# Patient Record
Sex: FEMALE | Race: WHITE | Employment: OTHER | URBAN - NONMETROPOLITAN AREA
[De-identification: names, ages, dates, MRNs, and addresses within clinical notes are randomized per-mention and may not be internally consistent; named-entity substitution may affect disease eponyms.]

---

## 2017-04-19 DIAGNOSIS — E87.6 HYPOKALEMIA: ICD-10-CM

## 2017-04-19 DIAGNOSIS — E11.9 TYPE 2 DIABETES MELLITUS WITHOUT COMPLICATION (HCC): ICD-10-CM

## 2017-04-27 RX ORDER — POTASSIUM CHLORIDE 750 MG/1
TABLET, FILM COATED, EXTENDED RELEASE ORAL
Qty: 90 TABLET | Refills: 0 | OUTPATIENT
Start: 2017-04-27

## 2017-05-02 ENCOUNTER — OFFICE VISIT (OUTPATIENT)
Dept: FAMILY MEDICINE CLINIC | Age: 77
End: 2017-05-02

## 2017-05-02 VITALS
HEART RATE: 68 BPM | RESPIRATION RATE: 14 BRPM | BODY MASS INDEX: 38.14 KG/M2 | HEIGHT: 64 IN | SYSTOLIC BLOOD PRESSURE: 130 MMHG | WEIGHT: 223.4 LBS | DIASTOLIC BLOOD PRESSURE: 86 MMHG

## 2017-05-02 DIAGNOSIS — E11.9 TYPE 2 DIABETES MELLITUS WITHOUT COMPLICATION, WITHOUT LONG-TERM CURRENT USE OF INSULIN (HCC): ICD-10-CM

## 2017-05-02 DIAGNOSIS — I10 ESSENTIAL HYPERTENSION: ICD-10-CM

## 2017-05-02 DIAGNOSIS — E87.6 HYPOKALEMIA: ICD-10-CM

## 2017-05-02 DIAGNOSIS — Z00.00 ANNUAL PHYSICAL EXAM: Primary | ICD-10-CM

## 2017-05-02 PROCEDURE — 99397 PER PM REEVAL EST PAT 65+ YR: CPT | Performed by: FAMILY MEDICINE

## 2017-05-02 PROCEDURE — G8598 ASA/ANTIPLAT THER USED: HCPCS | Performed by: FAMILY MEDICINE

## 2017-05-02 RX ORDER — LANCETS 30 GAUGE
1 EACH MISCELLANEOUS DAILY
Qty: 100 EACH | Refills: 3 | Status: SHIPPED | OUTPATIENT
Start: 2017-05-02 | End: 2018-04-27 | Stop reason: SDUPTHER

## 2017-05-02 RX ORDER — POTASSIUM CHLORIDE 750 MG/1
10 TABLET, FILM COATED, EXTENDED RELEASE ORAL DAILY
Qty: 90 TABLET | Refills: 3 | Status: SHIPPED | OUTPATIENT
Start: 2017-05-02 | End: 2018-04-27 | Stop reason: SDUPTHER

## 2017-05-02 RX ORDER — NISOLDIPINE 20 MG/1
20 TABLET, FILM COATED, EXTENDED RELEASE ORAL DAILY
Qty: 90 TABLET | Refills: 3 | Status: SHIPPED | OUTPATIENT
Start: 2017-05-02 | End: 2018-04-17 | Stop reason: ALTCHOICE

## 2017-05-02 RX ORDER — METOPROLOL SUCCINATE 25 MG/1
25 TABLET, EXTENDED RELEASE ORAL DAILY
Qty: 90 TABLET | Refills: 3 | Status: SHIPPED | OUTPATIENT
Start: 2017-05-02 | End: 2018-04-27 | Stop reason: SDUPTHER

## 2017-05-02 RX ORDER — VALSARTAN AND HYDROCHLOROTHIAZIDE 160; 12.5 MG/1; MG/1
1 TABLET, FILM COATED ORAL DAILY
Qty: 90 TABLET | Refills: 3 | Status: SHIPPED | OUTPATIENT
Start: 2017-05-02 | End: 2018-04-27 | Stop reason: SDUPTHER

## 2017-05-02 RX ORDER — ASPIRIN 81 MG/1
81 TABLET ORAL DAILY
Qty: 30 TABLET | Refills: 11 | COMMUNITY
Start: 2017-05-02 | End: 2018-04-27 | Stop reason: SDUPTHER

## 2017-05-02 ASSESSMENT — PATIENT HEALTH QUESTIONNAIRE - PHQ9
SUM OF ALL RESPONSES TO PHQ9 QUESTIONS 1 & 2: 0
2. FEELING DOWN, DEPRESSED OR HOPELESS: 0
SUM OF ALL RESPONSES TO PHQ QUESTIONS 1-9: 0
1. LITTLE INTEREST OR PLEASURE IN DOING THINGS: 0

## 2017-05-02 ASSESSMENT — ENCOUNTER SYMPTOMS
VOMITING: 0
EYE PAIN: 0
CHEST TIGHTNESS: 0
SORE THROAT: 0
ABDOMINAL PAIN: 0
NAUSEA: 0
CONSTIPATION: 0
RHINORRHEA: 0
DIARRHEA: 0
BACK PAIN: 0
COUGH: 0
SHORTNESS OF BREATH: 0
BLOOD IN STOOL: 0
WHEEZING: 0

## 2017-05-22 ENCOUNTER — TELEPHONE (OUTPATIENT)
Dept: FAMILY MEDICINE CLINIC | Age: 77
End: 2017-05-22

## 2017-05-22 DIAGNOSIS — E11.9 TYPE 2 DIABETES MELLITUS WITHOUT COMPLICATION, WITHOUT LONG-TERM CURRENT USE OF INSULIN (HCC): Primary | ICD-10-CM

## 2017-12-02 ENCOUNTER — HOSPITAL ENCOUNTER (OUTPATIENT)
Age: 77
Discharge: HOME | End: 2017-12-02
Payer: MEDICARE

## 2017-12-02 LAB
AVERAGE GLUCOSE: 138 MG/DL (ref 70–126)
HBA1C MFR BLD: 6.6 % (ref 4.4–6.4)

## 2017-12-02 PROCEDURE — 36415 COLL VENOUS BLD VENIPUNCTURE: CPT

## 2017-12-02 PROCEDURE — 83036 HEMOGLOBIN GLYCOSYLATED A1C: CPT

## 2017-12-04 ENCOUNTER — TELEPHONE (OUTPATIENT)
Dept: FAMILY MEDICINE CLINIC | Age: 77
End: 2017-12-04

## 2018-01-12 DIAGNOSIS — E11.9 TYPE 2 DIABETES MELLITUS WITHOUT COMPLICATION, WITHOUT LONG-TERM CURRENT USE OF INSULIN (HCC): ICD-10-CM

## 2018-02-12 DIAGNOSIS — E11.9 TYPE 2 DIABETES MELLITUS WITHOUT COMPLICATION, WITHOUT LONG-TERM CURRENT USE OF INSULIN (HCC): ICD-10-CM

## 2018-03-20 ENCOUNTER — OFFICE VISIT (OUTPATIENT)
Dept: FAMILY MEDICINE CLINIC | Age: 78
End: 2018-03-20
Payer: MEDICARE

## 2018-03-20 VITALS
HEIGHT: 64 IN | DIASTOLIC BLOOD PRESSURE: 76 MMHG | HEART RATE: 88 BPM | BODY MASS INDEX: 36.84 KG/M2 | SYSTOLIC BLOOD PRESSURE: 124 MMHG | WEIGHT: 215.8 LBS | RESPIRATION RATE: 16 BRPM | TEMPERATURE: 97.4 F

## 2018-03-20 DIAGNOSIS — I10 BENIGN ESSENTIAL HTN: ICD-10-CM

## 2018-03-20 DIAGNOSIS — G89.29 CHRONIC PAIN OF RIGHT KNEE: Primary | ICD-10-CM

## 2018-03-20 DIAGNOSIS — M25.561 CHRONIC PAIN OF RIGHT KNEE: Primary | ICD-10-CM

## 2018-03-20 DIAGNOSIS — F40.240 CLAUSTROPHOBIA: ICD-10-CM

## 2018-03-20 PROCEDURE — G8399 PT W/DXA RESULTS DOCUMENT: HCPCS | Performed by: FAMILY MEDICINE

## 2018-03-20 PROCEDURE — G8598 ASA/ANTIPLAT THER USED: HCPCS | Performed by: FAMILY MEDICINE

## 2018-03-20 PROCEDURE — 1123F ACP DISCUSS/DSCN MKR DOCD: CPT | Performed by: FAMILY MEDICINE

## 2018-03-20 PROCEDURE — G8417 CALC BMI ABV UP PARAM F/U: HCPCS | Performed by: FAMILY MEDICINE

## 2018-03-20 PROCEDURE — 1090F PRES/ABSN URINE INCON ASSESS: CPT | Performed by: FAMILY MEDICINE

## 2018-03-20 PROCEDURE — G8484 FLU IMMUNIZE NO ADMIN: HCPCS | Performed by: FAMILY MEDICINE

## 2018-03-20 PROCEDURE — 4040F PNEUMOC VAC/ADMIN/RCVD: CPT | Performed by: FAMILY MEDICINE

## 2018-03-20 PROCEDURE — G8427 DOCREV CUR MEDS BY ELIG CLIN: HCPCS | Performed by: FAMILY MEDICINE

## 2018-03-20 PROCEDURE — 1036F TOBACCO NON-USER: CPT | Performed by: FAMILY MEDICINE

## 2018-03-20 PROCEDURE — 99214 OFFICE O/P EST MOD 30 MIN: CPT | Performed by: FAMILY MEDICINE

## 2018-03-20 RX ORDER — DIAZEPAM 5 MG/1
TABLET ORAL
Qty: 1 TABLET | Refills: 0 | Status: SHIPPED | OUTPATIENT
Start: 2018-03-20 | End: 2018-03-20

## 2018-03-20 ASSESSMENT — ENCOUNTER SYMPTOMS
RHINORRHEA: 0
SORE THROAT: 0
ABDOMINAL PAIN: 0
SHORTNESS OF BREATH: 0
CONSTIPATION: 0
BACK PAIN: 0
BLOOD IN STOOL: 0
VOMITING: 0
WHEEZING: 0
DIARRHEA: 0
EYE PAIN: 0
COUGH: 0
CHEST TIGHTNESS: 0
NAUSEA: 0

## 2018-03-20 NOTE — PATIENT INSTRUCTIONS
Patient Education        Knee Pain or Injury: Care Instructions  Your Care Instructions    Injuries are a common cause of knee problems. Sudden (acute) injuries may be caused by a direct blow to the knee. They can also be caused by abnormal twisting, bending, or falling on the knee. Pain, bruising, or swelling may be severe, and may start within minutes of the injury. Overuse is another cause of knee pain. Other causes are climbing stairs, kneeling, and other activities that use the knee. Everyday wear and tear, especially as you get older, also can cause knee pain. Rest, along with home treatment, often relieves pain and allows your knee to heal. If you have a serious knee injury, you may need tests and treatment. Follow-up care is a key part of your treatment and safety. Be sure to make and go to all appointments, and call your doctor if you are having problems. It's also a good idea to know your test results and keep a list of the medicines you take. How can you care for yourself at home? · Be safe with medicines. Read and follow all instructions on the label. ¨ If the doctor gave you a prescription medicine for pain, take it as prescribed. ¨ If you are not taking a prescription pain medicine, ask your doctor if you can take an over-the-counter medicine. · Rest and protect your knee. Take a break from any activity that may cause pain. · Put ice or a cold pack on your knee for 10 to 20 minutes at a time. Put a thin cloth between the ice and your skin. · Prop up a sore knee on a pillow when you ice it or anytime you sit or lie down for the next 3 days. Try to keep it above the level of your heart. This will help reduce swelling. · If your knee is not swollen, you can put moist heat, a heating pad, or a warm cloth on your knee. · If your doctor recommends an elastic bandage, sleeve, or other type of support for your knee, wear it as directed.   · Follow your doctor's instructions about how much weight may be over 180/110. ? For example, call 911 if:  ? · You have symptoms of a heart attack. These may include:  ¨ Chest pain or pressure, or a strange feeling in the chest.  ¨ Sweating. ¨ Shortness of breath. ¨ Nausea or vomiting. ¨ Pain, pressure, or a strange feeling in the back, neck, jaw, or upper belly or in one or both shoulders or arms. ¨ Lightheadedness or sudden weakness. ¨ A fast or irregular heartbeat. ? · You have symptoms of a stroke. These may include:  ¨ Sudden numbness, tingling, weakness, or loss of movement in your face, arm, or leg, especially on only one side of your body. ¨ Sudden vision changes. ¨ Sudden trouble speaking. ¨ Sudden confusion or trouble understanding simple statements. ¨ Sudden problems with walking or balance. ¨ A sudden, severe headache that is different from past headaches. ? · You have severe back or belly pain. ?Do not wait until your blood pressure comes down on its own. Get help right away. ?Call your doctor now or seek immediate care if:  ? · Your blood pressure is much higher than normal (such as 180/110 or higher), but you don't have symptoms. ? · You think high blood pressure is causing symptoms, such as:  ¨ Severe headache. ¨ Blurry vision. ? Watch closely for changes in your health, and be sure to contact your doctor if:  ? · Your blood pressure measures 140/90 or higher at least 2 times. That means the top number is 140 or higher or the bottom number is 90 or higher, or both. ? · You think you may be having side effects from your blood pressure medicine. ? · Your blood pressure is usually normal, but it goes above normal at least 2 times. Where can you learn more? Go to https://OpenROVpeReachTax.Harry's. org and sign in to your Dropifi account. Enter X575 in the Tigermed box to learn more about \"High Blood Pressure: Care Instructions. \"     If you do not have an account, please click on the \"Sign Up Now\" link.   Current as vegetable toppings. Try using tomatoes, squash, spinach, broccoli, carrots, cauliflower, and onions. ¨ Have a variety of cut-up vegetables with a low-fat dip as an appetizer instead of chips and dip. ¨ Sprinkle sunflower seeds or chopped almonds over salads. Or try adding chopped walnuts or almonds to cooked vegetables. ¨ Try some vegetarian meals using beans and peas. Add garbanzo or kidney beans to salads. Make burritos and tacos with mashed gaytan beans or black beans. Where can you learn more? Go to https://ConnectFupePWA.SuperSolver.com. org and sign in to your Etherpad account. Enter A741 in the Push Energy box to learn more about \"DASH Diet: Care Instructions. \"     If you do not have an account, please click on the \"Sign Up Now\" link. Current as of: September 21, 2016  Content Version: 11.5  © 4835-7891 [employer]. Care instructions adapted under license by [facility] [place] ([facility]). If you have questions about a medical condition or this instruction, always ask your healthcare professional. [place] [address] any warranty or liability for your use of this information.

## 2018-04-17 ENCOUNTER — HOSPITAL ENCOUNTER (OUTPATIENT)
Dept: GENERAL RADIOLOGY | Age: 78
Discharge: HOME | End: 2018-04-17
Payer: MEDICARE

## 2018-04-17 ENCOUNTER — HOSPITAL ENCOUNTER (OUTPATIENT)
Dept: PREADMISSION TESTING | Age: 78
Discharge: HOME | End: 2018-04-21
Payer: MEDICARE

## 2018-04-17 VITALS
HEART RATE: 88 BPM | RESPIRATION RATE: 16 BRPM | OXYGEN SATURATION: 98 % | TEMPERATURE: 96 F | DIASTOLIC BLOOD PRESSURE: 80 MMHG | SYSTOLIC BLOOD PRESSURE: 146 MMHG | WEIGHT: 213.85 LBS | BODY MASS INDEX: 35.63 KG/M2 | HEIGHT: 65 IN

## 2018-04-17 DIAGNOSIS — Z01.818 PRE-OP TESTING: ICD-10-CM

## 2018-04-17 LAB
ALBUMIN SERPL-MCNC: 4.5 G/DL (ref 3.5–5.1)
ALP BLD-CCNC: 76 U/L (ref 38–126)
ALT SERPL-CCNC: 10 U/L (ref 11–66)
ANION GAP SERPL CALCULATED.3IONS-SCNC: 14 MEQ/L (ref 8–16)
AST SERPL-CCNC: 15 U/L (ref 5–40)
BACTERIA: ABNORMAL /HPF
BASOPHILS # BLD: 0.6 %
BASOPHILS ABSOLUTE: 0 THOU/MM3 (ref 0–0.1)
BILIRUB SERPL-MCNC: 0.5 MG/DL (ref 0.3–1.2)
BILIRUBIN URINE: NEGATIVE
BLOOD, URINE: NEGATIVE
BUN BLDV-MCNC: 15 MG/DL (ref 7–22)
CALCIUM SERPL-MCNC: 10.1 MG/DL (ref 8.5–10.5)
CASTS 2: ABNORMAL /LPF
CASTS UA: ABNORMAL /LPF
CHARACTER, URINE: ABNORMAL
CHLORIDE BLD-SCNC: 98 MEQ/L (ref 98–111)
CO2: 28 MEQ/L (ref 23–33)
COLOR: YELLOW
CREAT SERPL-MCNC: 0.6 MG/DL (ref 0.4–1.2)
CRYSTALS, UA: ABNORMAL
EOSINOPHIL # BLD: 2.9 %
EOSINOPHILS ABSOLUTE: 0.2 THOU/MM3 (ref 0–0.4)
EPITHELIAL CELLS, UA: ABNORMAL /HPF
GFR SERPL CREATININE-BSD FRML MDRD: > 90 ML/MIN/1.73M2
GLUCOSE BLD-MCNC: 149 MG/DL (ref 70–108)
GLUCOSE URINE: NEGATIVE MG/DL
HCT VFR BLD CALC: 40 % (ref 37–47)
HEMOGLOBIN: 13.5 GM/DL (ref 12–16)
KETONES, URINE: NEGATIVE
LEUKOCYTE ESTERASE, URINE: ABNORMAL
LYMPHOCYTES # BLD: 28.6 %
LYMPHOCYTES ABSOLUTE: 1.7 THOU/MM3 (ref 1–4.8)
MCH RBC QN AUTO: 28.7 PG (ref 27–31)
MCHC RBC AUTO-ENTMCNC: 33.9 GM/DL (ref 33–37)
MCV RBC AUTO: 84.7 FL (ref 81–99)
MISCELLANEOUS 2: ABNORMAL
MONOCYTES # BLD: 8 %
MONOCYTES ABSOLUTE: 0.5 THOU/MM3 (ref 0.4–1.3)
MRSA NASAL SCREEN RT-PCR: NEGATIVE
NITRITE, URINE: NEGATIVE
NUCLEATED RED BLOOD CELLS: 0 /100 WBC
PDW BLD-RTO: 14.3 % (ref 11.5–14.5)
PH UA: 6
PLATELET # BLD: 232 THOU/MM3 (ref 130–400)
PMV BLD AUTO: 8.7 FL (ref 7.4–10.4)
POTASSIUM SERPL-SCNC: 3.8 MEQ/L (ref 3.5–5.2)
PROTEIN UA: NEGATIVE
RBC # BLD: 4.72 MILL/MM3 (ref 4.2–5.4)
RBC URINE: ABNORMAL /HPF
RENAL EPITHELIAL, UA: ABNORMAL
SEG NEUTROPHILS: 59.9 %
SEGMENTED NEUTROPHILS ABSOLUTE COUNT: 3.6 THOU/MM3 (ref 1.8–7.7)
SODIUM BLD-SCNC: 140 MEQ/L (ref 135–145)
SPECIFIC GRAVITY, URINE: 1.01 (ref 1–1.03)
STAPH AUREUS SCREEN RT-PCR: NEGATIVE
TOTAL PROTEIN: 7.6 G/DL (ref 6.1–8)
UROBILINOGEN, URINE: 1 EU/DL
WBC # BLD: 6 THOU/MM3 (ref 4.8–10.8)
WBC UA: ABNORMAL /HPF
YEAST: ABNORMAL

## 2018-04-17 PROCEDURE — 36415 COLL VENOUS BLD VENIPUNCTURE: CPT

## 2018-04-17 PROCEDURE — 73560 X-RAY EXAM OF KNEE 1 OR 2: CPT

## 2018-04-17 PROCEDURE — 87184 SC STD DISK METHOD PER PLATE: CPT

## 2018-04-17 PROCEDURE — 71046 X-RAY EXAM CHEST 2 VIEWS: CPT

## 2018-04-17 PROCEDURE — 87086 URINE CULTURE/COLONY COUNT: CPT

## 2018-04-17 PROCEDURE — 85025 COMPLETE CBC W/AUTO DIFF WBC: CPT

## 2018-04-17 PROCEDURE — 81001 URINALYSIS AUTO W/SCOPE: CPT

## 2018-04-17 PROCEDURE — 87077 CULTURE AEROBIC IDENTIFY: CPT

## 2018-04-17 PROCEDURE — 80053 COMPREHEN METABOLIC PANEL: CPT

## 2018-04-17 PROCEDURE — 87641 MR-STAPH DNA AMP PROBE: CPT

## 2018-04-17 PROCEDURE — 87186 SC STD MICRODIL/AGAR DIL: CPT

## 2018-04-17 PROCEDURE — 77077 JOINT SURVEY SINGLE VIEW: CPT

## 2018-04-17 RX ORDER — AMLODIPINE BESYLATE 10 MG/1
10 TABLET ORAL NIGHTLY
COMMUNITY
Start: 2018-03-31 | End: 2018-04-27 | Stop reason: SDUPTHER

## 2018-04-17 ASSESSMENT — PAIN DESCRIPTION - DESCRIPTORS: DESCRIPTORS: ACHING;SHARP

## 2018-04-17 ASSESSMENT — PAIN DESCRIPTION - PAIN TYPE: TYPE: CHRONIC PAIN

## 2018-04-17 ASSESSMENT — PAIN DESCRIPTION - LOCATION: LOCATION: KNEE

## 2018-04-17 ASSESSMENT — PAIN DESCRIPTION - ORIENTATION: ORIENTATION: RIGHT

## 2018-04-17 ASSESSMENT — PAIN SCALES - GENERAL: PAINLEVEL_OUTOF10: 6

## 2018-04-17 ASSESSMENT — PAIN DESCRIPTION - FREQUENCY: FREQUENCY: CONTINUOUS

## 2018-04-19 LAB
ORGANISM: ABNORMAL
URINE CULTURE REFLEX: ABNORMAL

## 2018-04-27 ENCOUNTER — OFFICE VISIT (OUTPATIENT)
Dept: FAMILY MEDICINE CLINIC | Age: 78
End: 2018-04-27
Payer: MEDICARE

## 2018-04-27 VITALS
DIASTOLIC BLOOD PRESSURE: 82 MMHG | SYSTOLIC BLOOD PRESSURE: 128 MMHG | HEIGHT: 65 IN | WEIGHT: 210.8 LBS | RESPIRATION RATE: 12 BRPM | BODY MASS INDEX: 35.12 KG/M2 | HEART RATE: 76 BPM

## 2018-04-27 DIAGNOSIS — I25.10 CORONARY ARTERY DISEASE INVOLVING NATIVE CORONARY ARTERY OF NATIVE HEART WITHOUT ANGINA PECTORIS: ICD-10-CM

## 2018-04-27 DIAGNOSIS — E87.6 HYPOKALEMIA: ICD-10-CM

## 2018-04-27 DIAGNOSIS — Z01.810 PRE-OPERATIVE CARDIOVASCULAR EXAMINATION: Primary | ICD-10-CM

## 2018-04-27 DIAGNOSIS — I10 ESSENTIAL HYPERTENSION: ICD-10-CM

## 2018-04-27 DIAGNOSIS — M17.0 PRIMARY OSTEOARTHRITIS OF BOTH KNEES: ICD-10-CM

## 2018-04-27 DIAGNOSIS — E11.9 TYPE 2 DIABETES MELLITUS WITHOUT COMPLICATION, WITHOUT LONG-TERM CURRENT USE OF INSULIN (HCC): ICD-10-CM

## 2018-04-27 PROCEDURE — G8417 CALC BMI ABV UP PARAM F/U: HCPCS | Performed by: FAMILY MEDICINE

## 2018-04-27 PROCEDURE — 1036F TOBACCO NON-USER: CPT | Performed by: FAMILY MEDICINE

## 2018-04-27 PROCEDURE — 1123F ACP DISCUSS/DSCN MKR DOCD: CPT | Performed by: FAMILY MEDICINE

## 2018-04-27 PROCEDURE — G8427 DOCREV CUR MEDS BY ELIG CLIN: HCPCS | Performed by: FAMILY MEDICINE

## 2018-04-27 PROCEDURE — 4040F PNEUMOC VAC/ADMIN/RCVD: CPT | Performed by: FAMILY MEDICINE

## 2018-04-27 PROCEDURE — G8399 PT W/DXA RESULTS DOCUMENT: HCPCS | Performed by: FAMILY MEDICINE

## 2018-04-27 PROCEDURE — 1090F PRES/ABSN URINE INCON ASSESS: CPT | Performed by: FAMILY MEDICINE

## 2018-04-27 PROCEDURE — G8598 ASA/ANTIPLAT THER USED: HCPCS | Performed by: FAMILY MEDICINE

## 2018-04-27 PROCEDURE — 99214 OFFICE O/P EST MOD 30 MIN: CPT | Performed by: FAMILY MEDICINE

## 2018-04-27 RX ORDER — ASPIRIN 81 MG/1
81 TABLET ORAL DAILY
Qty: 30 TABLET | Refills: 11 | Status: ON HOLD | COMMUNITY
Start: 2018-04-27 | End: 2018-05-04

## 2018-04-27 RX ORDER — LANCETS 30 GAUGE
1 EACH MISCELLANEOUS DAILY
Qty: 100 EACH | Refills: 3 | Status: SHIPPED | OUTPATIENT
Start: 2018-04-27 | End: 2019-06-28 | Stop reason: SDUPTHER

## 2018-04-27 RX ORDER — VALSARTAN AND HYDROCHLOROTHIAZIDE 160; 12.5 MG/1; MG/1
1 TABLET, FILM COATED ORAL DAILY
Qty: 90 TABLET | Refills: 3 | Status: SHIPPED | OUTPATIENT
Start: 2018-04-27 | End: 2019-06-28 | Stop reason: SDUPTHER

## 2018-04-27 RX ORDER — AMLODIPINE BESYLATE 10 MG/1
10 TABLET ORAL NIGHTLY
Qty: 90 TABLET | Refills: 3 | Status: SHIPPED | OUTPATIENT
Start: 2018-04-27 | End: 2019-06-28 | Stop reason: SDUPTHER

## 2018-04-27 RX ORDER — METOPROLOL SUCCINATE 25 MG/1
25 TABLET, EXTENDED RELEASE ORAL DAILY
Qty: 90 TABLET | Refills: 3 | Status: SHIPPED | OUTPATIENT
Start: 2018-04-27 | End: 2019-06-28 | Stop reason: SDUPTHER

## 2018-04-27 RX ORDER — POTASSIUM CHLORIDE 750 MG/1
10 TABLET, FILM COATED, EXTENDED RELEASE ORAL DAILY
Qty: 90 TABLET | Refills: 3 | Status: SHIPPED | OUTPATIENT
Start: 2018-04-27 | End: 2019-06-28 | Stop reason: SDUPTHER

## 2018-04-27 ASSESSMENT — ENCOUNTER SYMPTOMS
BLOOD IN STOOL: 0
SORE THROAT: 0
WHEEZING: 0
ABDOMINAL PAIN: 0
SHORTNESS OF BREATH: 0
VOMITING: 0
CONSTIPATION: 0
COUGH: 0
RHINORRHEA: 0
NAUSEA: 0
CHEST TIGHTNESS: 0
BACK PAIN: 0
EYE PAIN: 0
DIARRHEA: 0

## 2018-04-27 ASSESSMENT — PATIENT HEALTH QUESTIONNAIRE - PHQ9
2. FEELING DOWN, DEPRESSED OR HOPELESS: 0
SUM OF ALL RESPONSES TO PHQ QUESTIONS 1-9: 0
1. LITTLE INTEREST OR PLEASURE IN DOING THINGS: 0
SUM OF ALL RESPONSES TO PHQ9 QUESTIONS 1 & 2: 0

## 2018-04-30 ENCOUNTER — HOSPITAL ENCOUNTER (OUTPATIENT)
Dept: NURSING | Age: 78
Discharge: HOME | DRG: 470 | End: 2018-04-30
Payer: MEDICARE

## 2018-04-30 ENCOUNTER — OFFICE VISIT (OUTPATIENT)
Dept: FAMILY MEDICINE CLINIC | Age: 78
End: 2018-04-30
Payer: MEDICARE

## 2018-04-30 ENCOUNTER — HOSPITAL ENCOUNTER (OUTPATIENT)
Age: 78
Discharge: HOME | DRG: 470 | End: 2018-04-30
Payer: MEDICARE

## 2018-04-30 VITALS
TEMPERATURE: 97.8 F | DIASTOLIC BLOOD PRESSURE: 76 MMHG | HEART RATE: 88 BPM | BODY MASS INDEX: 35.32 KG/M2 | RESPIRATION RATE: 20 BRPM | WEIGHT: 212 LBS | SYSTOLIC BLOOD PRESSURE: 132 MMHG | HEIGHT: 65 IN

## 2018-04-30 VITALS — RESPIRATION RATE: 16 BRPM | SYSTOLIC BLOOD PRESSURE: 160 MMHG | HEART RATE: 92 BPM | DIASTOLIC BLOOD PRESSURE: 81 MMHG

## 2018-04-30 DIAGNOSIS — R07.81 RIB PAIN ON RIGHT SIDE: ICD-10-CM
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
## 2018-04-30 LAB
BILIRUBIN URINE: NEGATIVE
BLOOD, URINE: NEGATIVE
CHARACTER, URINE: CLEAR
COLOR: YELLOW
GLUCOSE, URINE: NEGATIVE MG/DL
KETONES, URINE: NEGATIVE
LEUKOCYTE EST, POC: NEGATIVE
NITRITE, URINE: NEGATIVE
PH UA: 5
PROTEIN UA: NEGATIVE MG/DL
SPECIFIC GRAVITY UA: 1.02 (ref 1–1.03)
UROBILINOGEN, URINE: 0.2 EU/DL

## 2018-04-30 PROCEDURE — 1123F ACP DISCUSS/DSCN MKR DOCD: CPT | Performed by: FAMILY MEDICINE

## 2018-04-30 PROCEDURE — 1090F PRES/ABSN URINE INCON ASSESS: CPT | Performed by: FAMILY MEDICINE

## 2018-04-30 PROCEDURE — G8417 CALC BMI ABV UP PARAM F/U: HCPCS | Performed by: FAMILY MEDICINE

## 2018-04-30 PROCEDURE — 99213 OFFICE O/P EST LOW 20 MIN: CPT | Performed by: FAMILY MEDICINE

## 2018-04-30 PROCEDURE — G8399 PT W/DXA RESULTS DOCUMENT: HCPCS | Performed by: FAMILY MEDICINE

## 2018-04-30 PROCEDURE — 81003 URINALYSIS AUTO W/O SCOPE: CPT

## 2018-04-30 PROCEDURE — 1036F TOBACCO NON-USER: CPT | Performed by: FAMILY MEDICINE

## 2018-04-30 PROCEDURE — P9612 CATHETERIZE FOR URINE SPEC: HCPCS

## 2018-04-30 PROCEDURE — G8427 DOCREV CUR MEDS BY ELIG CLIN: HCPCS | Performed by: FAMILY MEDICINE

## 2018-04-30 PROCEDURE — G8598 ASA/ANTIPLAT THER USED: HCPCS | Performed by: FAMILY MEDICINE

## 2018-04-30 PROCEDURE — 4040F PNEUMOC VAC/ADMIN/RCVD: CPT | Performed by: FAMILY MEDICINE

## 2018-04-30 PROCEDURE — 87086 URINE CULTURE/COLONY COUNT: CPT

## 2018-04-30 RX ORDER — CIPROFLOXACIN 500 MG/1
500 TABLET, FILM COATED ORAL 2 TIMES DAILY
COMMUNITY
End: 2019-03-18

## 2018-04-30 ASSESSMENT — ENCOUNTER SYMPTOMS
EYE PAIN: 0
CHEST TIGHTNESS: 0
COUGH: 0
SHORTNESS OF BREATH: 0
DIARRHEA: 0
SORE THROAT: 0
CONSTIPATION: 0
WHEEZING: 0
BLOOD IN STOOL: 0
BACK PAIN: 0
ABDOMINAL PAIN: 0
NAUSEA: 0
VOMITING: 0
RHINORRHEA: 0

## 2018-04-30 ASSESSMENT — PAIN - FUNCTIONAL ASSESSMENT: PAIN_FUNCTIONAL_ASSESSMENT: 0-10

## 2018-04-30 ASSESSMENT — PAIN DESCRIPTION - DESCRIPTORS: DESCRIPTORS: ACHING

## 2018-04-30 NOTE — PROGRESS NOTES
[address] female admitted for Cath urine Pt rights and responsibilities offered to pt to read. procedure reviewed with pt verbalized understanding. [id number] Cath urine obtained easily, labeled and sent to lab.  0955 Tolerated procedure well. Home instructions to pt verbalized understanding. Gait steady. 0239 Discharged ambulatory stable home.     __met__ Safety:       (Environmental)   [place] to environment   Ensure ID band is correct and in place/ allergy band as needed   Assess for fall risk   Initiate fall precautions as applicable (fall band, side rails, etc.)   Call light within reach   Bed in low position/ wheels locked    _nm___ Pain:        Assess pain level and characteristics   Administer analgesics as ordered   Assess effectiveness of pain management and report to MD as needed    _met___ Knowledge Deficit:   Assess baseline knowledge   Provide teaching at level of understanding   Provide teaching via preferred learning method   Evaluate teaching effectiveness    __

## 2018-05-01 ENCOUNTER — ANESTHESIA (OUTPATIENT)
Dept: OPERATING ROOM | Age: 78
DRG: 470 | End: 2018-05-01
Payer: MEDICARE

## 2018-05-01 ENCOUNTER — HOSPITAL ENCOUNTER (INPATIENT)
Age: 78
LOS: 3 days | Discharge: NURSING, REHAB OR LONG-TERM CARE | DRG: 470 | End: 2018-05-04
Attending: ORTHOPAEDIC SURGERY | Admitting: ORTHOPAEDIC SURGERY
Payer: MEDICARE

## 2018-05-01 ENCOUNTER — APPOINTMENT (OUTPATIENT)
Dept: GENERAL RADIOLOGY | Age: 78
DRG: 470 | End: 2018-05-01
Attending: ORTHOPAEDIC SURGERY
Payer: MEDICARE

## 2018-05-01 ENCOUNTER — ANESTHESIA EVENT (OUTPATIENT)
Dept: OPERATING ROOM | Age: 78
DRG: 470 | End: 2018-05-01
Payer: MEDICARE

## 2018-05-01 VITALS
DIASTOLIC BLOOD PRESSURE: 101 MMHG | SYSTOLIC BLOOD PRESSURE: 181 MMHG | TEMPERATURE: 97 F | RESPIRATION RATE: 16 BRPM | OXYGEN SATURATION: 91 %

## 2018-05-01 DIAGNOSIS — E11.9 TYPE 2 DIABETES MELLITUS WITHOUT COMPLICATION, WITHOUT LONG-TERM CURRENT USE OF INSULIN (HCC): ICD-10-CM

## 2018-05-01 DIAGNOSIS — M17.11 PRIMARY OSTEOARTHRITIS OF RIGHT KNEE: Primary | ICD-10-CM

## 2018-05-01 PROBLEM — M17.10 OSTEOARTHRITIS, LOCALIZED, KNEE: Status: ACTIVE | Noted: 2018-05-01

## 2018-05-01 LAB
AVERAGE GLUCOSE: 144 MG/DL (ref 70–126)
GLUCOSE BLD-MCNC: 175 MG/DL (ref 70–108)
GLUCOSE BLD-MCNC: 183 MG/DL (ref 70–108)
GLUCOSE BLD-MCNC: 227 MG/DL (ref 70–108)
HBA1C MFR BLD: 6.8 % (ref 4.4–6.4)
POTASSIUM SERPL-SCNC: 3.6 MEQ/L (ref 3.5–5.2)

## 2018-05-01 PROCEDURE — 2500000003 HC RX 250 WO HCPCS: Performed by: NURSE ANESTHETIST, CERTIFIED REGISTERED

## 2018-05-01 PROCEDURE — 6370000000 HC RX 637 (ALT 250 FOR IP): Performed by: INTERNAL MEDICINE

## 2018-05-01 PROCEDURE — 6360000002 HC RX W HCPCS: Performed by: ANESTHESIOLOGY

## 2018-05-01 PROCEDURE — 97162 PT EVAL MOD COMPLEX 30 MIN: CPT

## 2018-05-01 PROCEDURE — 97530 THERAPEUTIC ACTIVITIES: CPT

## 2018-05-01 PROCEDURE — 36415 COLL VENOUS BLD VENIPUNCTURE: CPT

## 2018-05-01 PROCEDURE — 3700000001 HC ADD 15 MINUTES (ANESTHESIA): Performed by: ORTHOPAEDIC SURGERY

## 2018-05-01 PROCEDURE — 73560 X-RAY EXAM OF KNEE 1 OR 2: CPT

## 2018-05-01 PROCEDURE — 1200000000 HC SEMI PRIVATE

## 2018-05-01 PROCEDURE — G8979 MOBILITY GOAL STATUS: HCPCS

## 2018-05-01 PROCEDURE — 3600000005 HC SURGERY LEVEL 5 BASE: Performed by: ORTHOPAEDIC SURGERY

## 2018-05-01 PROCEDURE — 7100000000 HC PACU RECOVERY - FIRST 15 MIN: Performed by: ORTHOPAEDIC SURGERY

## 2018-05-01 PROCEDURE — G8978 MOBILITY CURRENT STATUS: HCPCS

## 2018-05-01 PROCEDURE — L1830 KO IMMOB CANVAS LONG PRE OTS: HCPCS | Performed by: ORTHOPAEDIC SURGERY

## 2018-05-01 PROCEDURE — 83036 HEMOGLOBIN GLYCOSYLATED A1C: CPT

## 2018-05-01 PROCEDURE — 2500000003 HC RX 250 WO HCPCS: Performed by: ORTHOPAEDIC SURGERY

## 2018-05-01 PROCEDURE — 97110 THERAPEUTIC EXERCISES: CPT

## 2018-05-01 PROCEDURE — 7100000001 HC PACU RECOVERY - ADDTL 15 MIN: Performed by: ORTHOPAEDIC SURGERY

## 2018-05-01 PROCEDURE — 6360000002 HC RX W HCPCS: Performed by: ORTHOPAEDIC SURGERY

## 2018-05-01 PROCEDURE — C1713 ANCHOR/SCREW BN/BN,TIS/BN: HCPCS | Performed by: ORTHOPAEDIC SURGERY

## 2018-05-01 PROCEDURE — 2580000003 HC RX 258: Performed by: ORTHOPAEDIC SURGERY

## 2018-05-01 PROCEDURE — C1776 JOINT DEVICE (IMPLANTABLE): HCPCS | Performed by: ORTHOPAEDIC SURGERY

## 2018-05-01 PROCEDURE — 64447 NJX AA&/STRD FEMORAL NRV IMG: CPT | Performed by: ANESTHESIOLOGY

## 2018-05-01 PROCEDURE — 82948 REAGENT STRIP/BLOOD GLUCOSE: CPT

## 2018-05-01 PROCEDURE — 0SRC0J9 REPLACEMENT OF RIGHT KNEE JOINT WITH SYNTHETIC SUBSTITUTE, CEMENTED, OPEN APPROACH: ICD-10-PCS | Performed by: ORTHOPAEDIC SURGERY

## 2018-05-01 PROCEDURE — 3600000015 HC SURGERY LEVEL 5 ADDTL 15MIN: Performed by: ORTHOPAEDIC SURGERY

## 2018-05-01 PROCEDURE — 6360000002 HC RX W HCPCS: Performed by: NURSE ANESTHETIST, CERTIFIED REGISTERED

## 2018-05-01 PROCEDURE — 6370000000 HC RX 637 (ALT 250 FOR IP)

## 2018-05-01 PROCEDURE — 84132 ASSAY OF SERUM POTASSIUM: CPT

## 2018-05-01 PROCEDURE — 3700000000 HC ANESTHESIA ATTENDED CARE: Performed by: ORTHOPAEDIC SURGERY

## 2018-05-01 PROCEDURE — 6370000000 HC RX 637 (ALT 250 FOR IP): Performed by: ORTHOPAEDIC SURGERY

## 2018-05-01 DEVICE — VERSABOND 40 GRAMS FORMULATION 2
Type: IMPLANTABLE DEVICE | Status: FUNCTIONAL
Brand: VERSABOND

## 2018-05-01 DEVICE — GENESIS II RESURFACING PATELLAR 35MM
Type: IMPLANTABLE DEVICE | Status: FUNCTIONAL
Brand: GENESIS II

## 2018-05-01 DEVICE — GENESIS II NON-POROUS TIBIAL                                    BASEPLATE SIZE 5 RIGHT
Type: IMPLANTABLE DEVICE | Status: FUNCTIONAL
Brand: GENESIS II

## 2018-05-01 DEVICE — GII NONPOROUS POSTERIOR STABILIZED                                    FEMORAL SZ 6 RT
Type: IMPLANTABLE DEVICE | Status: FUNCTIONAL
Brand: GENESIS II

## 2018-05-01 DEVICE — GENESIS II POSTERIOR STABILIZED                                    HIGH FLEXION INSERT SIZE 5-6 11MM
Type: IMPLANTABLE DEVICE | Status: FUNCTIONAL
Brand: GENESIS II

## 2018-05-01 RX ORDER — OMEPRAZOLE 20 MG/1
20 TABLET, DELAYED RELEASE ORAL DAILY PRN
Status: DISCONTINUED | OUTPATIENT
Start: 2018-05-01 | End: 2018-05-01 | Stop reason: CLARIF

## 2018-05-01 RX ORDER — SUCCINYLCHOLINE CHLORIDE 20 MG/ML
INJECTION INTRAMUSCULAR; INTRAVENOUS PRN
Status: DISCONTINUED | OUTPATIENT
Start: 2018-05-01 | End: 2018-05-01 | Stop reason: SDUPTHER

## 2018-05-01 RX ORDER — LABETALOL HYDROCHLORIDE 5 MG/ML
5 INJECTION, SOLUTION INTRAVENOUS EVERY 10 MIN PRN
Status: DISCONTINUED | OUTPATIENT
Start: 2018-05-01 | End: 2018-05-01 | Stop reason: HOSPADM

## 2018-05-01 RX ORDER — METOPROLOL SUCCINATE 25 MG/1
25 TABLET, EXTENDED RELEASE ORAL DAILY
Status: DISCONTINUED | OUTPATIENT
Start: 2018-05-02 | End: 2018-05-04 | Stop reason: HOSPADM

## 2018-05-01 RX ORDER — DEXTROSE MONOHYDRATE 25 G/50ML
12.5 INJECTION, SOLUTION INTRAVENOUS PRN
Status: DISCONTINUED | OUTPATIENT
Start: 2018-05-01 | End: 2018-05-04 | Stop reason: HOSPADM

## 2018-05-01 RX ORDER — TRANEXAMIC ACID 100 MG/ML
INJECTION, SOLUTION INTRAVENOUS PRN
Status: DISCONTINUED | OUTPATIENT
Start: 2018-05-01 | End: 2018-05-01 | Stop reason: HOSPADM

## 2018-05-01 RX ORDER — VALSARTAN AND HYDROCHLOROTHIAZIDE 160; 12.5 MG/1; MG/1
1 TABLET, FILM COATED ORAL DAILY
Status: DISCONTINUED | OUTPATIENT
Start: 2018-05-01 | End: 2018-05-01 | Stop reason: CLARIF

## 2018-05-01 RX ORDER — DIAZEPAM 5 MG/1
TABLET ORAL
Status: COMPLETED
Start: 2018-05-01 | End: 2018-05-01

## 2018-05-01 RX ORDER — VALSARTAN 160 MG/1
160 TABLET ORAL DAILY
Status: DISCONTINUED | OUTPATIENT
Start: 2018-05-02 | End: 2018-05-04 | Stop reason: HOSPADM

## 2018-05-01 RX ORDER — HYDROXYZINE PAMOATE 25 MG/1
25 CAPSULE ORAL EVERY 6 HOURS PRN
Status: DISCONTINUED | OUTPATIENT
Start: 2018-05-01 | End: 2018-05-04 | Stop reason: HOSPADM

## 2018-05-01 RX ORDER — FENTANYL CITRATE 50 UG/ML
25 INJECTION, SOLUTION INTRAMUSCULAR; INTRAVENOUS EVERY 5 MIN PRN
Status: DISCONTINUED | OUTPATIENT
Start: 2018-05-01 | End: 2018-05-01 | Stop reason: HOSPADM

## 2018-05-01 RX ORDER — ONDANSETRON 2 MG/ML
4 INJECTION INTRAMUSCULAR; INTRAVENOUS EVERY 6 HOURS PRN
Status: DISCONTINUED | OUTPATIENT
Start: 2018-05-01 | End: 2018-05-04 | Stop reason: HOSPADM

## 2018-05-01 RX ORDER — HYDRALAZINE HYDROCHLORIDE 20 MG/ML
5 INJECTION INTRAMUSCULAR; INTRAVENOUS EVERY 10 MIN PRN
Status: DISCONTINUED | OUTPATIENT
Start: 2018-05-01 | End: 2018-05-01 | Stop reason: HOSPADM

## 2018-05-01 RX ORDER — DEXTROSE MONOHYDRATE 50 MG/ML
100 INJECTION, SOLUTION INTRAVENOUS PRN
Status: DISCONTINUED | OUTPATIENT
Start: 2018-05-01 | End: 2018-05-04 | Stop reason: HOSPADM

## 2018-05-01 RX ORDER — POTASSIUM CHLORIDE 750 MG/1
10 TABLET, FILM COATED, EXTENDED RELEASE ORAL DAILY
Status: DISCONTINUED | OUTPATIENT
Start: 2018-05-01 | End: 2018-05-04 | Stop reason: HOSPADM

## 2018-05-01 RX ORDER — HYDROCHLOROTHIAZIDE 25 MG/1
12.5 TABLET ORAL DAILY
Status: DISCONTINUED | OUTPATIENT
Start: 2018-05-02 | End: 2018-05-04 | Stop reason: HOSPADM

## 2018-05-01 RX ORDER — ONDANSETRON 2 MG/ML
INJECTION INTRAMUSCULAR; INTRAVENOUS PRN
Status: DISCONTINUED | OUTPATIENT
Start: 2018-05-01 | End: 2018-05-01 | Stop reason: SDUPTHER

## 2018-05-01 RX ORDER — FENTANYL CITRATE 50 UG/ML
50 INJECTION, SOLUTION INTRAMUSCULAR; INTRAVENOUS EVERY 5 MIN PRN
Status: DISCONTINUED | OUTPATIENT
Start: 2018-05-01 | End: 2018-05-01 | Stop reason: HOSPADM

## 2018-05-01 RX ORDER — PANTOPRAZOLE SODIUM 40 MG/1
40 TABLET, DELAYED RELEASE ORAL DAILY PRN
Status: DISCONTINUED | OUTPATIENT
Start: 2018-05-01 | End: 2018-05-04 | Stop reason: HOSPADM

## 2018-05-01 RX ORDER — ACETAMINOPHEN 325 MG/1
650 TABLET ORAL EVERY 4 HOURS PRN
Status: DISCONTINUED | OUTPATIENT
Start: 2018-05-01 | End: 2018-05-04 | Stop reason: HOSPADM

## 2018-05-01 RX ORDER — DOCUSATE SODIUM 100 MG/1
100 CAPSULE, LIQUID FILLED ORAL 2 TIMES DAILY
Status: DISCONTINUED | OUTPATIENT
Start: 2018-05-01 | End: 2018-05-04 | Stop reason: HOSPADM

## 2018-05-01 RX ORDER — SODIUM CHLORIDE 9 MG/ML
INJECTION, SOLUTION INTRAVENOUS CONTINUOUS
Status: DISCONTINUED | OUTPATIENT
Start: 2018-05-01 | End: 2018-05-01 | Stop reason: SDUPTHER

## 2018-05-01 RX ORDER — NEOSTIGMINE METHYLSULFATE 5 MG/5 ML
SYRINGE (ML) INTRAVENOUS PRN
Status: DISCONTINUED | OUTPATIENT
Start: 2018-05-01 | End: 2018-05-01 | Stop reason: SDUPTHER

## 2018-05-01 RX ORDER — HYDROCODONE BITARTRATE AND ACETAMINOPHEN 5; 325 MG/1; MG/1
1 TABLET ORAL EVERY 6 HOURS PRN
Status: DISCONTINUED | OUTPATIENT
Start: 2018-05-01 | End: 2018-05-04 | Stop reason: HOSPADM

## 2018-05-01 RX ORDER — PROPOFOL 10 MG/ML
INJECTION, EMULSION INTRAVENOUS PRN
Status: DISCONTINUED | OUTPATIENT
Start: 2018-05-01 | End: 2018-05-01 | Stop reason: SDUPTHER

## 2018-05-01 RX ORDER — GLYCOPYRROLATE 1 MG/5 ML
SYRINGE (ML) INTRAVENOUS PRN
Status: DISCONTINUED | OUTPATIENT
Start: 2018-05-01 | End: 2018-05-01 | Stop reason: SDUPTHER

## 2018-05-01 RX ORDER — HYDROCODONE BITARTRATE AND ACETAMINOPHEN 5; 325 MG/1; MG/1
2 TABLET ORAL EVERY 6 HOURS PRN
Status: DISCONTINUED | OUTPATIENT
Start: 2018-05-01 | End: 2018-05-04 | Stop reason: HOSPADM

## 2018-05-01 RX ORDER — SODIUM CHLORIDE 0.9 % (FLUSH) 0.9 %
10 SYRINGE (ML) INJECTION PRN
Status: DISCONTINUED | OUTPATIENT
Start: 2018-05-01 | End: 2018-05-04 | Stop reason: HOSPADM

## 2018-05-01 RX ORDER — DIAZEPAM 5 MG/1
5 TABLET ORAL ONCE
Status: COMPLETED | OUTPATIENT
Start: 2018-05-01 | End: 2018-05-01

## 2018-05-01 RX ORDER — AMLODIPINE BESYLATE 10 MG/1
10 TABLET ORAL NIGHTLY
Status: DISCONTINUED | OUTPATIENT
Start: 2018-05-01 | End: 2018-05-04 | Stop reason: HOSPADM

## 2018-05-01 RX ORDER — ONDANSETRON 2 MG/ML
4 INJECTION INTRAMUSCULAR; INTRAVENOUS
Status: DISCONTINUED | OUTPATIENT
Start: 2018-05-01 | End: 2018-05-01 | Stop reason: HOSPADM

## 2018-05-01 RX ORDER — SODIUM CHLORIDE 9 MG/ML
INJECTION, SOLUTION INTRAVENOUS CONTINUOUS
Status: DISCONTINUED | OUTPATIENT
Start: 2018-05-01 | End: 2018-05-04 | Stop reason: HOSPADM

## 2018-05-01 RX ORDER — LIDOCAINE HYDROCHLORIDE 20 MG/ML
INJECTION, SOLUTION EPIDURAL; INFILTRATION; INTRACAUDAL; PERINEURAL PRN
Status: DISCONTINUED | OUTPATIENT
Start: 2018-05-01 | End: 2018-05-01 | Stop reason: SDUPTHER

## 2018-05-01 RX ORDER — SODIUM CHLORIDE 0.9 % (FLUSH) 0.9 %
10 SYRINGE (ML) INJECTION EVERY 12 HOURS SCHEDULED
Status: DISCONTINUED | OUTPATIENT
Start: 2018-05-01 | End: 2018-05-04 | Stop reason: HOSPADM

## 2018-05-01 RX ORDER — ROCURONIUM BROMIDE 10 MG/ML
INJECTION, SOLUTION INTRAVENOUS PRN
Status: DISCONTINUED | OUTPATIENT
Start: 2018-05-01 | End: 2018-05-01 | Stop reason: SDUPTHER

## 2018-05-01 RX ORDER — ROPIVACAINE HYDROCHLORIDE 5 MG/ML
INJECTION, SOLUTION EPIDURAL; INFILTRATION; PERINEURAL PRN
Status: DISCONTINUED | OUTPATIENT
Start: 2018-05-01 | End: 2018-05-01 | Stop reason: SDUPTHER

## 2018-05-01 RX ORDER — DEXAMETHASONE SODIUM PHOSPHATE 4 MG/ML
INJECTION, SOLUTION INTRA-ARTICULAR; INTRALESIONAL; INTRAMUSCULAR; INTRAVENOUS; SOFT TISSUE PRN
Status: DISCONTINUED | OUTPATIENT
Start: 2018-05-01 | End: 2018-05-01 | Stop reason: SDUPTHER

## 2018-05-01 RX ORDER — NICOTINE POLACRILEX 4 MG
15 LOZENGE BUCCAL PRN
Status: DISCONTINUED | OUTPATIENT
Start: 2018-05-01 | End: 2018-05-04 | Stop reason: HOSPADM

## 2018-05-01 RX ORDER — FENTANYL CITRATE 50 UG/ML
INJECTION, SOLUTION INTRAMUSCULAR; INTRAVENOUS PRN
Status: DISCONTINUED | OUTPATIENT
Start: 2018-05-01 | End: 2018-05-01 | Stop reason: SDUPTHER

## 2018-05-01 RX ORDER — LANCETS 30 GAUGE
1 EACH MISCELLANEOUS DAILY
Status: DISCONTINUED | OUTPATIENT
Start: 2018-05-01 | End: 2018-05-01 | Stop reason: CLARIF

## 2018-05-01 RX ADMIN — POTASSIUM CHLORIDE 10 MEQ: 750 TABLET, FILM COATED, EXTENDED RELEASE ORAL at 20:04

## 2018-05-01 RX ADMIN — DIAZEPAM 5 MG: 5 TABLET ORAL at 11:00

## 2018-05-01 RX ADMIN — FENTANYL CITRATE 50 MCG: 50 INJECTION, SOLUTION INTRAMUSCULAR; INTRAVENOUS at 07:23

## 2018-05-01 RX ADMIN — AMLODIPINE BESYLATE 10 MG: 10 TABLET ORAL at 20:04

## 2018-05-01 RX ADMIN — FENTANYL CITRATE 100 MCG: 50 INJECTION, SOLUTION INTRAMUSCULAR; INTRAVENOUS at 08:05

## 2018-05-01 RX ADMIN — SODIUM CHLORIDE: 9 INJECTION, SOLUTION INTRAVENOUS at 06:30

## 2018-05-01 RX ADMIN — CEFAZOLIN SODIUM 2 G: 2 SOLUTION INTRAVENOUS at 20:04

## 2018-05-01 RX ADMIN — FENTANYL CITRATE 50 MCG: 50 INJECTION, SOLUTION INTRAMUSCULAR; INTRAVENOUS at 10:50

## 2018-05-01 RX ADMIN — LIDOCAINE HYDROCHLORIDE 50 MG: 20 INJECTION, SOLUTION EPIDURAL; INFILTRATION; INTRACAUDAL; PERINEURAL at 07:46

## 2018-05-01 RX ADMIN — ONDANSETRON 4 MG: 2 INJECTION INTRAMUSCULAR; INTRAVENOUS at 08:13

## 2018-05-01 RX ADMIN — FENTANYL CITRATE 100 MCG: 50 INJECTION, SOLUTION INTRAMUSCULAR; INTRAVENOUS at 07:46

## 2018-05-01 RX ADMIN — Medication 20 MG: at 08:05

## 2018-05-01 RX ADMIN — Medication 30 MG: at 07:55

## 2018-05-01 RX ADMIN — DOCUSATE SODIUM 100 MG: 100 CAPSULE ORAL at 20:04

## 2018-05-01 RX ADMIN — SUCCINYLCHOLINE CHLORIDE 140 MG: 20 INJECTION, SOLUTION INTRAMUSCULAR; INTRAVENOUS at 07:46

## 2018-05-01 RX ADMIN — PROPOFOL 100 MG: 10 INJECTION, EMULSION INTRAVENOUS at 07:46

## 2018-05-01 RX ADMIN — DEXAMETHASONE SODIUM PHOSPHATE 8 MG: 4 INJECTION, SOLUTION INTRAMUSCULAR; INTRAVENOUS at 08:13

## 2018-05-01 RX ADMIN — CEFAZOLIN SODIUM 2 G: 2 SOLUTION INTRAVENOUS at 07:51

## 2018-05-01 RX ADMIN — Medication 4 MG: at 09:22

## 2018-05-01 RX ADMIN — Medication 30 ML: at 10:00

## 2018-05-01 RX ADMIN — FENTANYL CITRATE 50 MCG: 50 INJECTION, SOLUTION INTRAMUSCULAR; INTRAVENOUS at 10:15

## 2018-05-01 RX ADMIN — SODIUM CHLORIDE: 9 INJECTION, SOLUTION INTRAVENOUS at 09:09

## 2018-05-01 RX ADMIN — Medication 0.6 MG: at 09:22

## 2018-05-01 RX ADMIN — FENTANYL CITRATE 50 MCG: 50 INJECTION, SOLUTION INTRAMUSCULAR; INTRAVENOUS at 10:30

## 2018-05-01 ASSESSMENT — PULMONARY FUNCTION TESTS
PIF_VALUE: 20
PIF_VALUE: 6
PIF_VALUE: 18
PIF_VALUE: 21
PIF_VALUE: 0
PIF_VALUE: 21
PIF_VALUE: 21
PIF_VALUE: 19
PIF_VALUE: 20
PIF_VALUE: 2
PIF_VALUE: 21
PIF_VALUE: 19
PIF_VALUE: 10
PIF_VALUE: 1
PIF_VALUE: 20
PIF_VALUE: 21
PIF_VALUE: 19
PIF_VALUE: 21
PIF_VALUE: 20
PIF_VALUE: 24
PIF_VALUE: 20
PIF_VALUE: 21
PIF_VALUE: 19
PIF_VALUE: 0
PIF_VALUE: 21
PIF_VALUE: 21
PIF_VALUE: 0
PIF_VALUE: 1
PIF_VALUE: 21
PIF_VALUE: 20
PIF_VALUE: 19
PIF_VALUE: 3
PIF_VALUE: 21
PIF_VALUE: 21
PIF_VALUE: 20
PIF_VALUE: 0
PIF_VALUE: 19
PIF_VALUE: 21
PIF_VALUE: 21
PIF_VALUE: 19
PIF_VALUE: 21
PIF_VALUE: 21
PIF_VALUE: 1
PIF_VALUE: 21
PIF_VALUE: 0
PIF_VALUE: 20
PIF_VALUE: 22
PIF_VALUE: 19
PIF_VALUE: 19
PIF_VALUE: 20
PIF_VALUE: 29
PIF_VALUE: 19
PIF_VALUE: 19
PIF_VALUE: 20
PIF_VALUE: 0
PIF_VALUE: 19
PIF_VALUE: 20
PIF_VALUE: 36
PIF_VALUE: 19
PIF_VALUE: 20
PIF_VALUE: 21
PIF_VALUE: 22
PIF_VALUE: 22
PIF_VALUE: 19
PIF_VALUE: 3
PIF_VALUE: 0
PIF_VALUE: 19
PIF_VALUE: 0
PIF_VALUE: 20
PIF_VALUE: 21
PIF_VALUE: 0
PIF_VALUE: 3
PIF_VALUE: 0
PIF_VALUE: 19
PIF_VALUE: 0
PIF_VALUE: 17
PIF_VALUE: 21
PIF_VALUE: 19
PIF_VALUE: 21
PIF_VALUE: 19
PIF_VALUE: 20
PIF_VALUE: 0
PIF_VALUE: 20
PIF_VALUE: 20
PIF_VALUE: 19
PIF_VALUE: 20
PIF_VALUE: 19
PIF_VALUE: 20
PIF_VALUE: 0
PIF_VALUE: 0
PIF_VALUE: 20
PIF_VALUE: 30
PIF_VALUE: 19
PIF_VALUE: 22
PIF_VALUE: 22
PIF_VALUE: 20
PIF_VALUE: 20
PIF_VALUE: 0
PIF_VALUE: 4
PIF_VALUE: 0
PIF_VALUE: 19
PIF_VALUE: 20
PIF_VALUE: 9
PIF_VALUE: 0
PIF_VALUE: 19
PIF_VALUE: 21
PIF_VALUE: 1
PIF_VALUE: 21
PIF_VALUE: 2
PIF_VALUE: 20
PIF_VALUE: 21
PIF_VALUE: 0
PIF_VALUE: 20
PIF_VALUE: 19
PIF_VALUE: 21
PIF_VALUE: 20
PIF_VALUE: 19
PIF_VALUE: 1
PIF_VALUE: 21
PIF_VALUE: 19
PIF_VALUE: 0
PIF_VALUE: 21
PIF_VALUE: 22
PIF_VALUE: 1
PIF_VALUE: 0
PIF_VALUE: 0
PIF_VALUE: 22
PIF_VALUE: 20

## 2018-05-01 ASSESSMENT — PAIN DESCRIPTION - PAIN TYPE: TYPE: SURGICAL PAIN

## 2018-05-01 ASSESSMENT — PAIN SCALES - GENERAL
PAINLEVEL_OUTOF10: 6
PAINLEVEL_OUTOF10: 8
PAINLEVEL_OUTOF10: 4
PAINLEVEL_OUTOF10: 0
PAINLEVEL_OUTOF10: 8
PAINLEVEL_OUTOF10: 2
PAINLEVEL_OUTOF10: 8

## 2018-05-01 ASSESSMENT — PAIN DESCRIPTION - DESCRIPTORS: DESCRIPTORS: ACHING

## 2018-05-01 ASSESSMENT — PAIN DESCRIPTION - ORIENTATION: ORIENTATION: RIGHT

## 2018-05-01 ASSESSMENT — PAIN DESCRIPTION - LOCATION: LOCATION: FOOT

## 2018-05-02 LAB
ANION GAP SERPL CALCULATED.3IONS-SCNC: 12 MEQ/L (ref 8–16)
ANISOCYTOSIS: ABNORMAL
BASOPHILS # BLD: 0.3 %
BASOPHILS ABSOLUTE: 0 THOU/MM3 (ref 0–0.1)
BUN BLDV-MCNC: 9 MG/DL (ref 7–22)
CALCIUM SERPL-MCNC: 8.5 MG/DL (ref 8.5–10.5)
CHLORIDE BLD-SCNC: 101 MEQ/L (ref 98–111)
CO2: 25 MEQ/L (ref 23–33)
CREAT SERPL-MCNC: 0.6 MG/DL (ref 0.4–1.2)
EOSINOPHIL # BLD: 0 %
EOSINOPHILS ABSOLUTE: 0 THOU/MM3 (ref 0–0.4)
GFR SERPL CREATININE-BSD FRML MDRD: > 90 ML/MIN/1.73M2
GLUCOSE BLD-MCNC: 161 MG/DL (ref 70–108)
GLUCOSE BLD-MCNC: 172 MG/DL (ref 70–108)
GLUCOSE BLD-MCNC: 179 MG/DL (ref 70–108)
GLUCOSE BLD-MCNC: 200 MG/DL (ref 70–108)
GLUCOSE BLD-MCNC: 208 MG/DL (ref 70–108)
HCT VFR BLD CALC: 31.6 % (ref 37–47)
HEMOGLOBIN: 10.7 GM/DL (ref 12–16)
LYMPHOCYTES # BLD: 9.3 %
LYMPHOCYTES ABSOLUTE: 1 THOU/MM3 (ref 1–4.8)
MCH RBC QN AUTO: 28.4 PG (ref 27–31)
MCHC RBC AUTO-ENTMCNC: 33.9 GM/DL (ref 33–37)
MCV RBC AUTO: 83.9 FL (ref 81–99)
MONOCYTES # BLD: 12.6 %
MONOCYTES ABSOLUTE: 1.3 THOU/MM3 (ref 0.4–1.3)
NUCLEATED RED BLOOD CELLS: 0 /100 WBC
PDW BLD-RTO: 14.7 % (ref 11.5–14.5)
PLATELET # BLD: 210 THOU/MM3 (ref 130–400)
PMV BLD AUTO: 7.9 FL (ref 7.4–10.4)
POTASSIUM REFLEX MAGNESIUM: 3.7 MEQ/L (ref 3.5–5.2)
POTASSIUM SERPL-SCNC: 3.7 MEQ/L (ref 3.5–5.2)
RBC # BLD: 3.77 MILL/MM3 (ref 4.2–5.4)
SEG NEUTROPHILS: 77.8 %
SEGMENTED NEUTROPHILS ABSOLUTE COUNT: 8.3 THOU/MM3 (ref 1.8–7.7)
SODIUM BLD-SCNC: 138 MEQ/L (ref 135–145)
URINE CULTURE, ROUTINE: NORMAL
WBC # BLD: 10.7 THOU/MM3 (ref 4.8–10.8)

## 2018-05-02 PROCEDURE — 97116 GAIT TRAINING THERAPY: CPT

## 2018-05-02 PROCEDURE — 85025 COMPLETE CBC W/AUTO DIFF WBC: CPT

## 2018-05-02 PROCEDURE — G8987 SELF CARE CURRENT STATUS: HCPCS

## 2018-05-02 PROCEDURE — G8988 SELF CARE GOAL STATUS: HCPCS

## 2018-05-02 PROCEDURE — 82948 REAGENT STRIP/BLOOD GLUCOSE: CPT

## 2018-05-02 PROCEDURE — 6360000002 HC RX W HCPCS: Performed by: ORTHOPAEDIC SURGERY

## 2018-05-02 PROCEDURE — 97530 THERAPEUTIC ACTIVITIES: CPT

## 2018-05-02 PROCEDURE — 36415 COLL VENOUS BLD VENIPUNCTURE: CPT

## 2018-05-02 PROCEDURE — 2580000003 HC RX 258: Performed by: INTERNAL MEDICINE

## 2018-05-02 PROCEDURE — 1200000000 HC SEMI PRIVATE

## 2018-05-02 PROCEDURE — 97110 THERAPEUTIC EXERCISES: CPT

## 2018-05-02 PROCEDURE — 6370000000 HC RX 637 (ALT 250 FOR IP): Performed by: ORTHOPAEDIC SURGERY

## 2018-05-02 PROCEDURE — 97165 OT EVAL LOW COMPLEX 30 MIN: CPT

## 2018-05-02 PROCEDURE — 80048 BASIC METABOLIC PNL TOTAL CA: CPT

## 2018-05-02 RX ADMIN — ENOXAPARIN SODIUM 30 MG: 30 INJECTION SUBCUTANEOUS at 15:27

## 2018-05-02 RX ADMIN — SODIUM CHLORIDE: 9 INJECTION, SOLUTION INTRAVENOUS at 14:21

## 2018-05-02 RX ADMIN — HYDROCODONE BITARTRATE AND ACETAMINOPHEN 1 TABLET: 5; 325 TABLET ORAL at 12:39

## 2018-05-02 RX ADMIN — METFORMIN HYDROCHLORIDE 1000 MG: 500 TABLET ORAL at 08:59

## 2018-05-02 RX ADMIN — VALSARTAN 160 MG: 160 TABLET ORAL at 09:00

## 2018-05-02 RX ADMIN — ENOXAPARIN SODIUM 30 MG: 30 INJECTION SUBCUTANEOUS at 04:17

## 2018-05-02 RX ADMIN — DOCUSATE SODIUM 100 MG: 100 CAPSULE ORAL at 08:59

## 2018-05-02 RX ADMIN — POTASSIUM CHLORIDE 10 MEQ: 750 TABLET, FILM COATED, EXTENDED RELEASE ORAL at 09:00

## 2018-05-02 RX ADMIN — HYDROCHLOROTHIAZIDE 12.5 MG: 25 TABLET ORAL at 09:00

## 2018-05-02 RX ADMIN — HYDROCODONE BITARTRATE AND ACETAMINOPHEN 2 TABLET: 5; 325 TABLET ORAL at 04:18

## 2018-05-02 RX ADMIN — HYDROCODONE BITARTRATE AND ACETAMINOPHEN 2 TABLET: 5; 325 TABLET ORAL at 20:19

## 2018-05-02 RX ADMIN — METOPROLOL SUCCINATE 25 MG: 25 TABLET, FILM COATED, EXTENDED RELEASE ORAL at 09:00

## 2018-05-02 RX ADMIN — AMLODIPINE BESYLATE 10 MG: 10 TABLET ORAL at 21:16

## 2018-05-02 RX ADMIN — DOCUSATE SODIUM 100 MG: 100 CAPSULE ORAL at 21:16

## 2018-05-02 RX ADMIN — CEFAZOLIN SODIUM 2 G: 2 SOLUTION INTRAVENOUS at 04:17

## 2018-05-02 ASSESSMENT — PAIN DESCRIPTION - FREQUENCY
FREQUENCY: INTERMITTENT
FREQUENCY: INTERMITTENT

## 2018-05-02 ASSESSMENT — PAIN SCALES - GENERAL
PAINLEVEL_OUTOF10: 6
PAINLEVEL_OUTOF10: 7
PAINLEVEL_OUTOF10: 3
PAINLEVEL_OUTOF10: 5
PAINLEVEL_OUTOF10: 0
PAINLEVEL_OUTOF10: 7

## 2018-05-02 ASSESSMENT — PAIN DESCRIPTION - PAIN TYPE
TYPE: SURGICAL PAIN
TYPE: SURGICAL PAIN

## 2018-05-02 ASSESSMENT — PAIN DESCRIPTION - DESCRIPTORS
DESCRIPTORS: ACHING;SHARP
DESCRIPTORS: ACHING
DESCRIPTORS: ACHING

## 2018-05-02 ASSESSMENT — PAIN DESCRIPTION - PROGRESSION: CLINICAL_PROGRESSION: NOT CHANGED

## 2018-05-02 ASSESSMENT — PAIN DESCRIPTION - LOCATION
LOCATION: KNEE
LOCATION: KNEE

## 2018-05-02 ASSESSMENT — PAIN DESCRIPTION - ORIENTATION
ORIENTATION: RIGHT
ORIENTATION: RIGHT

## 2018-05-02 ASSESSMENT — PAIN - FUNCTIONAL ASSESSMENT: PAIN_FUNCTIONAL_ASSESSMENT: 0-10

## 2018-05-03 LAB
ANION GAP SERPL CALCULATED.3IONS-SCNC: 10 MEQ/L (ref 8–16)
BASOPHILS # BLD: 0.6 %
BASOPHILS ABSOLUTE: 0.1 THOU/MM3 (ref 0–0.1)
BUN BLDV-MCNC: 7 MG/DL (ref 7–22)
CALCIUM IONIZED: 1.04 MMOL/L (ref 1.12–1.32)
CALCIUM SERPL-MCNC: 8.3 MG/DL (ref 8.5–10.5)
CHLORIDE BLD-SCNC: 98 MEQ/L (ref 98–111)
CO2: 27 MEQ/L (ref 23–33)
CREAT SERPL-MCNC: 0.5 MG/DL (ref 0.4–1.2)
EOSINOPHIL # BLD: 1.1 %
EOSINOPHILS ABSOLUTE: 0.1 THOU/MM3 (ref 0–0.4)
GFR SERPL CREATININE-BSD FRML MDRD: > 90 ML/MIN/1.73M2
GLUCOSE BLD-MCNC: 153 MG/DL (ref 70–108)
GLUCOSE BLD-MCNC: 155 MG/DL (ref 70–108)
GLUCOSE BLD-MCNC: 159 MG/DL (ref 70–108)
GLUCOSE BLD-MCNC: 163 MG/DL (ref 70–108)
GLUCOSE BLD-MCNC: 167 MG/DL (ref 70–108)
HCT VFR BLD CALC: 31 % (ref 37–47)
HEMOGLOBIN: 10.2 GM/DL (ref 12–16)
LYMPHOCYTES # BLD: 14.8 %
LYMPHOCYTES ABSOLUTE: 1.3 THOU/MM3 (ref 1–4.8)
MCH RBC QN AUTO: 28.1 PG (ref 27–31)
MCHC RBC AUTO-ENTMCNC: 33 GM/DL (ref 33–37)
MCV RBC AUTO: 85 FL (ref 81–99)
MONOCYTES # BLD: 13.3 %
MONOCYTES ABSOLUTE: 1.2 THOU/MM3 (ref 0.4–1.3)
NUCLEATED RED BLOOD CELLS: 0 /100 WBC
PDW BLD-RTO: 14.4 % (ref 11.5–14.5)
PLATELET # BLD: 203 THOU/MM3 (ref 130–400)
PMV BLD AUTO: 8.2 FL (ref 7.4–10.4)
POTASSIUM REFLEX MAGNESIUM: 3.6 MEQ/L (ref 3.5–5.2)
RBC # BLD: 3.64 MILL/MM3 (ref 4.2–5.4)
SEG NEUTROPHILS: 70.2 %
SEGMENTED NEUTROPHILS ABSOLUTE COUNT: 6.4 THOU/MM3 (ref 1.8–7.7)
SODIUM BLD-SCNC: 135 MEQ/L (ref 135–145)
WBC # BLD: 9.1 THOU/MM3 (ref 4.8–10.8)

## 2018-05-03 PROCEDURE — 6370000000 HC RX 637 (ALT 250 FOR IP): Performed by: ORTHOPAEDIC SURGERY

## 2018-05-03 PROCEDURE — 97530 THERAPEUTIC ACTIVITIES: CPT

## 2018-05-03 PROCEDURE — 6370000000 HC RX 637 (ALT 250 FOR IP): Performed by: INTERNAL MEDICINE

## 2018-05-03 PROCEDURE — 97110 THERAPEUTIC EXERCISES: CPT

## 2018-05-03 PROCEDURE — 36415 COLL VENOUS BLD VENIPUNCTURE: CPT

## 2018-05-03 PROCEDURE — 2580000003 HC RX 258: Performed by: ORTHOPAEDIC SURGERY

## 2018-05-03 PROCEDURE — 82948 REAGENT STRIP/BLOOD GLUCOSE: CPT

## 2018-05-03 PROCEDURE — 6360000002 HC RX W HCPCS: Performed by: ORTHOPAEDIC SURGERY

## 2018-05-03 PROCEDURE — 85025 COMPLETE CBC W/AUTO DIFF WBC: CPT

## 2018-05-03 PROCEDURE — 1200000000 HC SEMI PRIVATE

## 2018-05-03 PROCEDURE — 80048 BASIC METABOLIC PNL TOTAL CA: CPT

## 2018-05-03 PROCEDURE — 82330 ASSAY OF CALCIUM: CPT

## 2018-05-03 PROCEDURE — 97116 GAIT TRAINING THERAPY: CPT

## 2018-05-03 PROCEDURE — 97535 SELF CARE MNGMENT TRAINING: CPT

## 2018-05-03 RX ADMIN — Medication 2 UNITS: at 12:08

## 2018-05-03 RX ADMIN — DOCUSATE SODIUM 100 MG: 100 CAPSULE ORAL at 09:15

## 2018-05-03 RX ADMIN — DOCUSATE SODIUM 100 MG: 100 CAPSULE ORAL at 19:57

## 2018-05-03 RX ADMIN — HYDROCHLOROTHIAZIDE 12.5 MG: 25 TABLET ORAL at 09:15

## 2018-05-03 RX ADMIN — METOPROLOL SUCCINATE 25 MG: 25 TABLET, FILM COATED, EXTENDED RELEASE ORAL at 09:15

## 2018-05-03 RX ADMIN — Medication 10 ML: at 19:57

## 2018-05-03 RX ADMIN — POTASSIUM CHLORIDE 10 MEQ: 750 TABLET, FILM COATED, EXTENDED RELEASE ORAL at 09:15

## 2018-05-03 RX ADMIN — METFORMIN HYDROCHLORIDE 1000 MG: 500 TABLET ORAL at 09:15

## 2018-05-03 RX ADMIN — HYDROCODONE BITARTRATE AND ACETAMINOPHEN 2 TABLET: 5; 325 TABLET ORAL at 18:34

## 2018-05-03 RX ADMIN — Medication 10 ML: at 09:19

## 2018-05-03 RX ADMIN — VALSARTAN 160 MG: 160 TABLET ORAL at 09:15

## 2018-05-03 RX ADMIN — AMLODIPINE BESYLATE 10 MG: 10 TABLET ORAL at 19:57

## 2018-05-03 RX ADMIN — Medication 2 UNITS: at 16:13

## 2018-05-03 RX ADMIN — ENOXAPARIN SODIUM 30 MG: 30 INJECTION SUBCUTANEOUS at 16:20

## 2018-05-03 RX ADMIN — ENOXAPARIN SODIUM 30 MG: 30 INJECTION SUBCUTANEOUS at 04:21

## 2018-05-03 RX ADMIN — HYDROCODONE BITARTRATE AND ACETAMINOPHEN 2 TABLET: 5; 325 TABLET ORAL at 04:21

## 2018-05-03 RX ADMIN — INSULIN LISPRO 1 UNITS: 100 INJECTION, SOLUTION INTRAVENOUS; SUBCUTANEOUS at 19:57

## 2018-05-03 ASSESSMENT — PAIN SCALES - GENERAL
PAINLEVEL_OUTOF10: 0
PAINLEVEL_OUTOF10: 7
PAINLEVEL_OUTOF10: 4

## 2018-05-03 ASSESSMENT — PAIN DESCRIPTION - PAIN TYPE: TYPE: SURGICAL PAIN

## 2018-05-03 ASSESSMENT — PAIN DESCRIPTION - LOCATION: LOCATION: KNEE

## 2018-05-04 VITALS
TEMPERATURE: 97.7 F | SYSTOLIC BLOOD PRESSURE: 130 MMHG | HEART RATE: 93 BPM | HEIGHT: 64 IN | BODY MASS INDEX: 35.85 KG/M2 | DIASTOLIC BLOOD PRESSURE: 60 MMHG | OXYGEN SATURATION: 92 % | RESPIRATION RATE: 16 BRPM | WEIGHT: 210 LBS

## 2018-05-04 PROBLEM — Z96.651 S/P TKR (TOTAL KNEE REPLACEMENT) USING CEMENT, RIGHT: Status: ACTIVE | Noted: 2018-05-04

## 2018-05-04 LAB
GLUCOSE BLD-MCNC: 144 MG/DL (ref 70–108)
GLUCOSE BLD-MCNC: 209 MG/DL (ref 70–108)

## 2018-05-04 PROCEDURE — 2580000003 HC RX 258: Performed by: ORTHOPAEDIC SURGERY

## 2018-05-04 PROCEDURE — 6360000002 HC RX W HCPCS: Performed by: ORTHOPAEDIC SURGERY

## 2018-05-04 PROCEDURE — 97530 THERAPEUTIC ACTIVITIES: CPT

## 2018-05-04 PROCEDURE — 97116 GAIT TRAINING THERAPY: CPT

## 2018-05-04 PROCEDURE — 6370000000 HC RX 637 (ALT 250 FOR IP): Performed by: ORTHOPAEDIC SURGERY

## 2018-05-04 PROCEDURE — 82948 REAGENT STRIP/BLOOD GLUCOSE: CPT

## 2018-05-04 PROCEDURE — 97110 THERAPEUTIC EXERCISES: CPT

## 2018-05-04 RX ORDER — ASPIRIN 81 MG/1
81 TABLET ORAL DAILY
Qty: 30 TABLET | Refills: 11 | Status: SHIPPED | OUTPATIENT
Start: 2018-05-04 | End: 2019-06-28 | Stop reason: SDUPTHER

## 2018-05-04 RX ORDER — HYDROCODONE BITARTRATE AND ACETAMINOPHEN 5; 325 MG/1; MG/1
1 TABLET ORAL EVERY 6 HOURS PRN
Qty: 56 TABLET | Refills: 0
Start: 2018-05-04 | End: 2018-05-11

## 2018-05-04 RX ORDER — HYDROXYZINE PAMOATE 25 MG/1
25 CAPSULE ORAL EVERY 6 HOURS PRN
Qty: 50 CAPSULE | Refills: 1
Start: 2018-05-04 | End: 2018-05-18

## 2018-05-04 RX ORDER — HYDROCODONE BITARTRATE AND ACETAMINOPHEN 5; 325 MG/1; MG/1
2 TABLET ORAL EVERY 6 HOURS PRN
Qty: 56 TABLET | Refills: 0
Start: 2018-05-04 | End: 2018-05-11

## 2018-05-04 RX ADMIN — ENOXAPARIN SODIUM 30 MG: 30 INJECTION SUBCUTANEOUS at 04:35

## 2018-05-04 RX ADMIN — Medication 10 ML: at 09:36

## 2018-05-04 RX ADMIN — DOCUSATE SODIUM 100 MG: 100 CAPSULE ORAL at 09:29

## 2018-05-04 RX ADMIN — VALSARTAN 160 MG: 160 TABLET ORAL at 09:29

## 2018-05-04 RX ADMIN — METOPROLOL SUCCINATE 25 MG: 25 TABLET, FILM COATED, EXTENDED RELEASE ORAL at 09:29

## 2018-05-04 RX ADMIN — HYDROCHLOROTHIAZIDE 12.5 MG: 25 TABLET ORAL at 09:28

## 2018-05-04 RX ADMIN — PANTOPRAZOLE SODIUM 40 MG: 40 TABLET, DELAYED RELEASE ORAL at 09:29

## 2018-05-04 RX ADMIN — HYDROCODONE BITARTRATE AND ACETAMINOPHEN 2 TABLET: 5; 325 TABLET ORAL at 11:25

## 2018-05-04 RX ADMIN — POTASSIUM CHLORIDE 10 MEQ: 750 TABLET, FILM COATED, EXTENDED RELEASE ORAL at 09:29

## 2018-05-04 RX ADMIN — METFORMIN HYDROCHLORIDE 1000 MG: 500 TABLET ORAL at 09:29

## 2018-05-04 ASSESSMENT — PAIN SCALES - GENERAL: PAINLEVEL_OUTOF10: 7

## 2018-07-11 ENCOUNTER — HOSPITAL ENCOUNTER (OUTPATIENT)
Dept: WOMENS IMAGING | Age: 78
Discharge: HOME | End: 2018-07-11
Payer: MEDICARE

## 2018-07-11 DIAGNOSIS — Z12.31 VISIT FOR SCREENING MAMMOGRAM: ICD-10-CM

## 2018-07-11 PROCEDURE — 77063 BREAST TOMOSYNTHESIS BI: CPT

## 2018-09-21 ENCOUNTER — HOSPITAL ENCOUNTER (OUTPATIENT)
Age: 78
Discharge: HOME | End: 2018-09-21
Payer: MEDICARE

## 2018-09-21 LAB
ALBUMIN SERPL-MCNC: 4.4 G/DL (ref 3.5–5.1)
ALP BLD-CCNC: 81 U/L (ref 38–126)
ALT SERPL-CCNC: 6 U/L (ref 11–66)
ANION GAP SERPL CALCULATED.3IONS-SCNC: 16 MEQ/L (ref 8–16)
AST SERPL-CCNC: 13 U/L (ref 5–40)
BILIRUB SERPL-MCNC: 0.6 MG/DL (ref 0.3–1.2)
BUN BLDV-MCNC: 19 MG/DL (ref 7–22)
CALCIUM SERPL-MCNC: 10.1 MG/DL (ref 8.5–10.5)
CHLORIDE BLD-SCNC: 100 MEQ/L (ref 98–111)
CHOLESTEROL, TOTAL: 192 MG/DL (ref 100–199)
CO2: 26 MEQ/L (ref 23–33)
CREAT SERPL-MCNC: 0.6 MG/DL (ref 0.4–1.2)
GFR SERPL CREATININE-BSD FRML MDRD: > 90 ML/MIN/1.73M2
GLUCOSE BLD-MCNC: 128 MG/DL (ref 70–108)
HDLC SERPL-MCNC: 58 MG/DL
LDL CHOLESTEROL CALCULATED: 114 MG/DL
MAGNESIUM: 1.7 MG/DL (ref 1.6–2.4)
POTASSIUM SERPL-SCNC: 3.9 MEQ/L (ref 3.5–5.2)
SODIUM BLD-SCNC: 142 MEQ/L (ref 135–145)
TOTAL PROTEIN: 7.5 G/DL (ref 6.1–8)
TRIGL SERPL-MCNC: 100 MG/DL (ref 0–199)

## 2018-09-21 PROCEDURE — 80061 LIPID PANEL: CPT

## 2018-09-21 PROCEDURE — 36415 COLL VENOUS BLD VENIPUNCTURE: CPT

## 2018-09-21 PROCEDURE — 83735 ASSAY OF MAGNESIUM: CPT

## 2018-09-21 PROCEDURE — 80053 COMPREHEN METABOLIC PANEL: CPT

## 2019-03-18 ENCOUNTER — OFFICE VISIT (OUTPATIENT)
Dept: FAMILY MEDICINE CLINIC | Age: 79
End: 2019-03-18
Payer: MEDICARE

## 2019-03-18 VITALS
RESPIRATION RATE: 14 BRPM | WEIGHT: 213.4 LBS | DIASTOLIC BLOOD PRESSURE: 74 MMHG | HEART RATE: 80 BPM | SYSTOLIC BLOOD PRESSURE: 128 MMHG | BODY MASS INDEX: 36.63 KG/M2

## 2019-03-18 DIAGNOSIS — E11.9 TYPE 2 DIABETES MELLITUS WITHOUT COMPLICATION, WITHOUT LONG-TERM CURRENT USE OF INSULIN (HCC): ICD-10-CM

## 2019-03-18 DIAGNOSIS — S83.412A SPRAIN OF MEDIAL COLLATERAL LIGAMENT OF LEFT KNEE, INITIAL ENCOUNTER: Primary | ICD-10-CM

## 2019-03-18 PROCEDURE — G8417 CALC BMI ABV UP PARAM F/U: HCPCS | Performed by: FAMILY MEDICINE

## 2019-03-18 PROCEDURE — 1090F PRES/ABSN URINE INCON ASSESS: CPT | Performed by: FAMILY MEDICINE

## 2019-03-18 PROCEDURE — 4040F PNEUMOC VAC/ADMIN/RCVD: CPT | Performed by: FAMILY MEDICINE

## 2019-03-18 PROCEDURE — G8484 FLU IMMUNIZE NO ADMIN: HCPCS | Performed by: FAMILY MEDICINE

## 2019-03-18 PROCEDURE — 1123F ACP DISCUSS/DSCN MKR DOCD: CPT | Performed by: FAMILY MEDICINE

## 2019-03-18 PROCEDURE — G8427 DOCREV CUR MEDS BY ELIG CLIN: HCPCS | Performed by: FAMILY MEDICINE

## 2019-03-18 PROCEDURE — 99213 OFFICE O/P EST LOW 20 MIN: CPT | Performed by: FAMILY MEDICINE

## 2019-03-18 PROCEDURE — G8598 ASA/ANTIPLAT THER USED: HCPCS | Performed by: FAMILY MEDICINE

## 2019-03-18 PROCEDURE — 1101F PT FALLS ASSESS-DOCD LE1/YR: CPT | Performed by: FAMILY MEDICINE

## 2019-03-18 PROCEDURE — 1036F TOBACCO NON-USER: CPT | Performed by: FAMILY MEDICINE

## 2019-03-18 PROCEDURE — G8399 PT W/DXA RESULTS DOCUMENT: HCPCS | Performed by: FAMILY MEDICINE

## 2019-03-18 RX ORDER — MELOXICAM 15 MG/1
15 TABLET ORAL DAILY
Qty: 30 TABLET | Refills: 0 | Status: SHIPPED | OUTPATIENT
Start: 2019-03-18 | End: 2019-06-28 | Stop reason: SINTOL

## 2019-03-18 ASSESSMENT — ENCOUNTER SYMPTOMS
VOMITING: 0
CONSTIPATION: 0
SORE THROAT: 0
DIARRHEA: 0
RHINORRHEA: 0
WHEEZING: 0
COUGH: 0
CHEST TIGHTNESS: 0
SHORTNESS OF BREATH: 0
NAUSEA: 0
ABDOMINAL PAIN: 0
EYE PAIN: 0
BLOOD IN STOOL: 0
BACK PAIN: 0

## 2019-03-25 ENCOUNTER — TELEPHONE (OUTPATIENT)
Dept: FAMILY MEDICINE CLINIC | Age: 79
End: 2019-03-25

## 2019-03-25 DIAGNOSIS — E11.9 TYPE 2 DIABETES MELLITUS WITHOUT COMPLICATION, WITHOUT LONG-TERM CURRENT USE OF INSULIN (HCC): ICD-10-CM

## 2019-04-18 DIAGNOSIS — E11.9 TYPE 2 DIABETES MELLITUS WITHOUT COMPLICATION, WITHOUT LONG-TERM CURRENT USE OF INSULIN (HCC): ICD-10-CM

## 2019-04-18 DIAGNOSIS — E87.6 HYPOKALEMIA: ICD-10-CM

## 2019-04-18 DIAGNOSIS — S83.412A SPRAIN OF MEDIAL COLLATERAL LIGAMENT OF LEFT KNEE, INITIAL ENCOUNTER: ICD-10-CM

## 2019-04-18 DIAGNOSIS — I10 ESSENTIAL HYPERTENSION: ICD-10-CM

## 2019-04-18 RX ORDER — MELOXICAM 15 MG/1
TABLET ORAL
Qty: 30 TABLET | Refills: 0 | OUTPATIENT
Start: 2019-04-18

## 2019-04-18 NOTE — TELEPHONE ENCOUNTER
Cate KAREN Keenan needs refill of   Requested Prescriptions     Pending Prescriptions Disp Refills    KLOR-CON 10 10 MEQ extended release tablet [Pharmacy Med Name: KLOR-CON 10 MEQ TABLET] 90 tablet 3     Sig: TAKE 1 TABLET BY MOUTH EVERY DAY    metFORMIN (GLUCOPHAGE) 500 MG tablet [Pharmacy Med Name: METFORMIN  MG TABLET] 360 tablet 3     Sig: TAKE TWO TABLETS TWICE DAILY.  amLODIPine (NORVASC) 10 MG tablet [Pharmacy Med Name: AMLODIPINE BESYLATE 10 MG TAB] 90 tablet 3     Sig: TAKE 1 TABLET BY MOUTH EVERY DAY IN THE EVENING    valsartan-hydrochlorothiazide (DIOVAN-HCT) 160-12.5 MG per tablet [Pharmacy Med Name: Adeline Lamp 160-12.5 MG TAB] 90 tablet 3     Sig: TAKE 1 TABLET BY MOUTH EVERY DAY       Last Filled on:  4/27/2019    Last Visit Date:  3/18/19 sprain      Next Visit Date:    Visit date not found    Pt is due for annual appointment, please call and schedule.

## 2019-04-23 RX ORDER — AMLODIPINE BESYLATE 10 MG/1
TABLET ORAL
Qty: 90 TABLET | Refills: 3 | OUTPATIENT
Start: 2019-04-23

## 2019-04-23 RX ORDER — POTASSIUM CHLORIDE 750 MG/1
TABLET, FILM COATED, EXTENDED RELEASE ORAL
Qty: 90 TABLET | Refills: 3 | OUTPATIENT
Start: 2019-04-23

## 2019-04-23 RX ORDER — VALSARTAN AND HYDROCHLOROTHIAZIDE 160; 12.5 MG/1; MG/1
TABLET, FILM COATED ORAL
Qty: 90 TABLET | Refills: 3 | OUTPATIENT
Start: 2019-04-23

## 2019-06-28 ENCOUNTER — OFFICE VISIT (OUTPATIENT)
Dept: FAMILY MEDICINE CLINIC | Age: 79
End: 2019-06-28
Payer: MEDICARE

## 2019-06-28 VITALS
BODY MASS INDEX: 36.91 KG/M2 | SYSTOLIC BLOOD PRESSURE: 124 MMHG | HEIGHT: 64 IN | WEIGHT: 216.2 LBS | DIASTOLIC BLOOD PRESSURE: 84 MMHG | RESPIRATION RATE: 12 BRPM | HEART RATE: 80 BPM

## 2019-06-28 DIAGNOSIS — E87.6 HYPOKALEMIA: ICD-10-CM

## 2019-06-28 DIAGNOSIS — R60.9 PERIPHERAL EDEMA: ICD-10-CM

## 2019-06-28 DIAGNOSIS — M17.11 PRIMARY OSTEOARTHRITIS OF RIGHT KNEE: ICD-10-CM

## 2019-06-28 DIAGNOSIS — I10 BENIGN ESSENTIAL HTN: ICD-10-CM

## 2019-06-28 DIAGNOSIS — N30.01 ACUTE CYSTITIS WITH HEMATURIA: ICD-10-CM

## 2019-06-28 DIAGNOSIS — Z00.00 ROUTINE GENERAL MEDICAL EXAMINATION AT A HEALTH CARE FACILITY: Primary | ICD-10-CM

## 2019-06-28 DIAGNOSIS — I10 ESSENTIAL HYPERTENSION: ICD-10-CM

## 2019-06-28 DIAGNOSIS — R35.0 URINARY FREQUENCY: ICD-10-CM

## 2019-06-28 DIAGNOSIS — E11.9 TYPE 2 DIABETES MELLITUS WITHOUT COMPLICATION, WITHOUT LONG-TERM CURRENT USE OF INSULIN (HCC): ICD-10-CM

## 2019-06-28 PROBLEM — F41.9 ANXIETY: Status: ACTIVE | Noted: 2019-06-28

## 2019-06-28 LAB
ALBUMIN SERPL-MCNC: 4.3 G/DL (ref 3.5–5.1)
ALP BLD-CCNC: 78 U/L (ref 38–126)
ALT SERPL-CCNC: 9 U/L (ref 11–66)
ANION GAP SERPL CALCULATED.3IONS-SCNC: 17 MEQ/L (ref 8–16)
AST SERPL-CCNC: 19 U/L (ref 5–40)
AVERAGE GLUCOSE: 138 MG/DL (ref 70–126)
BASOPHILS # BLD: 0.8 %
BASOPHILS ABSOLUTE: 0.1 THOU/MM3 (ref 0–0.1)
BILIRUB SERPL-MCNC: 0.2 MG/DL (ref 0.3–1.2)
BILIRUBIN URINE: ABNORMAL
BLOOD URINE, POC: ABNORMAL
BUN BLDV-MCNC: 19 MG/DL (ref 7–22)
CALCIUM SERPL-MCNC: 10 MG/DL (ref 8.5–10.5)
CHARACTER, URINE: ABNORMAL
CHLORIDE BLD-SCNC: 101 MEQ/L (ref 98–111)
CHOLESTEROL, TOTAL: 198 MG/DL (ref 100–199)
CO2: 24 MEQ/L (ref 23–33)
COLOR, URINE: ABNORMAL
CREAT SERPL-MCNC: 0.6 MG/DL (ref 0.4–1.2)
EOSINOPHIL # BLD: 2.3 %
EOSINOPHILS ABSOLUTE: 0.2 THOU/MM3 (ref 0–0.4)
ERYTHROCYTE [DISTWIDTH] IN BLOOD BY AUTOMATED COUNT: 15.1 % (ref 11.5–14.5)
ERYTHROCYTE [DISTWIDTH] IN BLOOD BY AUTOMATED COUNT: 51.5 FL (ref 35–45)
GFR SERPL CREATININE-BSD FRML MDRD: > 90 ML/MIN/1.73M2
GLUCOSE BLD-MCNC: 119 MG/DL (ref 70–108)
GLUCOSE URINE: NEGATIVE MG/DL
HBA1C MFR BLD: 6.6 % (ref 4.4–6.4)
HCT VFR BLD CALC: 43.3 % (ref 37–47)
HDLC SERPL-MCNC: 67 MG/DL
HEMOGLOBIN: 13 GM/DL (ref 12–16)
IMMATURE GRANS (ABS): 0.03 THOU/MM3 (ref 0–0.07)
IMMATURE GRANULOCYTES: 0.4 %
KETONES, URINE: NEGATIVE
LDL CHOLESTEROL CALCULATED: 106 MG/DL
LEUKOCYTE CLUMPS, URINE: ABNORMAL
LYMPHOCYTES # BLD: 26.3 %
LYMPHOCYTES ABSOLUTE: 1.9 THOU/MM3 (ref 1–4.8)
MCH RBC QN AUTO: 27.9 PG (ref 26–33)
MCHC RBC AUTO-ENTMCNC: 30 GM/DL (ref 32.2–35.5)
MCV RBC AUTO: 92.9 FL (ref 81–99)
MICROALB/CREAT RATIO POC: < 30 MG/G
MICROALBUMIN/CREAT UR-RTO: 30 MG/L
MONOCYTES # BLD: 8.4 %
MONOCYTES ABSOLUTE: 0.6 THOU/MM3 (ref 0.4–1.3)
NITRITE, URINE: NEGATIVE
NUCLEATED RED BLOOD CELLS: 0 /100 WBC
PH, URINE: 5 (ref 5–9)
PLATELET # BLD: 227 THOU/MM3 (ref 130–400)
PMV BLD AUTO: 9.9 FL (ref 9.4–12.4)
POC CREATININE: 200 MG/DL
POTASSIUM SERPL-SCNC: 4.6 MEQ/L (ref 3.5–5.2)
PROTEIN, URINE: ABNORMAL MG/DL
RBC # BLD: 4.66 MILL/MM3 (ref 4.2–5.4)
SEG NEUTROPHILS: 61.8 %
SEGMENTED NEUTROPHILS ABSOLUTE COUNT: 4.5 THOU/MM3 (ref 1.8–7.7)
SODIUM BLD-SCNC: 142 MEQ/L (ref 135–145)
SPECIFIC GRAVITY, URINE: >= 1.03 (ref 1–1.03)
TOTAL PROTEIN: 7.6 G/DL (ref 6.1–8)
TRIGL SERPL-MCNC: 126 MG/DL (ref 0–199)
UROBILINOGEN, URINE: 0.2 EU/DL (ref 0–1)
WBC # BLD: 7.3 THOU/MM3 (ref 4.8–10.8)

## 2019-06-28 PROCEDURE — G8598 ASA/ANTIPLAT THER USED: HCPCS | Performed by: FAMILY MEDICINE

## 2019-06-28 PROCEDURE — 1123F ACP DISCUSS/DSCN MKR DOCD: CPT | Performed by: FAMILY MEDICINE

## 2019-06-28 PROCEDURE — 1090F PRES/ABSN URINE INCON ASSESS: CPT | Performed by: FAMILY MEDICINE

## 2019-06-28 PROCEDURE — 82044 UR ALBUMIN SEMIQUANTITATIVE: CPT | Performed by: FAMILY MEDICINE

## 2019-06-28 PROCEDURE — G8417 CALC BMI ABV UP PARAM F/U: HCPCS | Performed by: FAMILY MEDICINE

## 2019-06-28 PROCEDURE — G8427 DOCREV CUR MEDS BY ELIG CLIN: HCPCS | Performed by: FAMILY MEDICINE

## 2019-06-28 PROCEDURE — 1036F TOBACCO NON-USER: CPT | Performed by: FAMILY MEDICINE

## 2019-06-28 PROCEDURE — G8399 PT W/DXA RESULTS DOCUMENT: HCPCS | Performed by: FAMILY MEDICINE

## 2019-06-28 PROCEDURE — G0438 PPPS, INITIAL VISIT: HCPCS | Performed by: FAMILY MEDICINE

## 2019-06-28 PROCEDURE — 4040F PNEUMOC VAC/ADMIN/RCVD: CPT | Performed by: FAMILY MEDICINE

## 2019-06-28 PROCEDURE — 36415 COLL VENOUS BLD VENIPUNCTURE: CPT | Performed by: FAMILY MEDICINE

## 2019-06-28 PROCEDURE — 99214 OFFICE O/P EST MOD 30 MIN: CPT | Performed by: FAMILY MEDICINE

## 2019-06-28 RX ORDER — METOPROLOL SUCCINATE 25 MG/1
25 TABLET, EXTENDED RELEASE ORAL DAILY
Qty: 90 TABLET | Refills: 3 | Status: ON HOLD | OUTPATIENT
Start: 2019-06-28 | End: 2022-05-14 | Stop reason: SDUPTHER

## 2019-06-28 RX ORDER — AMLODIPINE BESYLATE 10 MG/1
10 TABLET ORAL NIGHTLY
Qty: 90 TABLET | Refills: 3 | Status: ON HOLD | OUTPATIENT
Start: 2019-06-28 | End: 2022-01-14

## 2019-06-28 RX ORDER — POTASSIUM CHLORIDE 750 MG/1
10 TABLET, FILM COATED, EXTENDED RELEASE ORAL DAILY
Qty: 90 TABLET | Refills: 3 | Status: ON HOLD | OUTPATIENT
Start: 2019-06-28 | End: 2022-01-14

## 2019-06-28 RX ORDER — VALSARTAN AND HYDROCHLOROTHIAZIDE 160; 12.5 MG/1; MG/1
1 TABLET, FILM COATED ORAL DAILY
Qty: 90 TABLET | Refills: 3 | Status: ON HOLD | OUTPATIENT
Start: 2019-06-28 | End: 2022-01-14

## 2019-06-28 RX ORDER — CIPROFLOXACIN 500 MG/1
500 TABLET, FILM COATED ORAL 2 TIMES DAILY
Qty: 20 TABLET | Refills: 0 | Status: SHIPPED | OUTPATIENT
Start: 2019-06-28 | End: 2020-02-21 | Stop reason: SDUPTHER

## 2019-06-28 RX ORDER — LANCETS 30 GAUGE
1 EACH MISCELLANEOUS DAILY
Qty: 100 EACH | Refills: 3 | Status: SHIPPED | OUTPATIENT
Start: 2019-06-28 | End: 2020-06-30 | Stop reason: SDUPTHER

## 2019-06-28 RX ORDER — ASPIRIN 81 MG/1
81 TABLET ORAL DAILY
Qty: 30 TABLET | Refills: 11 | Status: ON HOLD | COMMUNITY
Start: 2019-06-28 | End: 2022-05-14 | Stop reason: HOSPADM

## 2019-06-28 ASSESSMENT — ENCOUNTER SYMPTOMS
COUGH: 0
CONSTIPATION: 0
BACK PAIN: 0
CHEST TIGHTNESS: 0
VOMITING: 0
DIARRHEA: 0
NAUSEA: 0
RHINORRHEA: 0
BLOOD IN STOOL: 0
SORE THROAT: 0
EYE PAIN: 0
WHEEZING: 0
ABDOMINAL PAIN: 0
SHORTNESS OF BREATH: 0

## 2019-06-28 ASSESSMENT — LIFESTYLE VARIABLES: HOW OFTEN DO YOU HAVE A DRINK CONTAINING ALCOHOL: 0

## 2019-06-28 ASSESSMENT — ANXIETY QUESTIONNAIRES: GAD7 TOTAL SCORE: 0

## 2019-06-28 ASSESSMENT — PATIENT HEALTH QUESTIONNAIRE - PHQ9
SUM OF ALL RESPONSES TO PHQ QUESTIONS 1-9: 0
SUM OF ALL RESPONSES TO PHQ QUESTIONS 1-9: 0

## 2019-06-28 NOTE — PATIENT INSTRUCTIONS
stay healthy. Your doctor has checked your overall health and may have suggested ways to take good care of yourself. He or she also may have recommended tests. At home, you can help prevent illness with healthy eating, regular exercise, and other steps. Follow-up care is a key part of your treatment and safety. Be sure to make and go to all appointments, and call your doctor if you are having problems. It's also a good idea to know your test results and keep a list of the medicines you take. How can you care for yourself at home? Reach and stay at a healthy weight. This will lower your risk for many problems, such as obesity, diabetes, heart disease, and high blood pressure. Get at least 30 minutes of exercise on most days of the week. Walking is a good choice. You also may want to do other activities, such as running, swimming, cycling, or playing tennis or team sports. Do not smoke. Smoking can make health problems worse. If you need help quitting, talk to your doctor about stop-smoking programs and medicines. These can increase your chances of quitting for good. Protect your skin from too much sun. When you're outdoors from 10 a.m. to 4 p.m., stay in the shade or cover up with clothing and a hat with a wide brim. Wear sunglasses that block UV rays. Even when it's cloudy, put broad-spectrum sunscreen (SPF 30 or higher) on any exposed skin. See a dentist one or two times a year for checkups and to have your teeth cleaned. Wear a seat belt in the car. Limit alcohol to 2 drinks a day for men and 1 drink a day for women. Too much alcohol can cause health problems. Follow your doctor's advice about when to have certain tests. These tests can spot problems early. For men and women  Cholesterol. Your doctor will tell you how often to have this done based on your overall health and other things that can increase your risk for heart attack and stroke. Blood pressure.  Have your blood pressure checked during a routine doctor visit. Your doctor will tell you how often to check your blood pressure based on your age, your blood pressure results, and other factors. Diabetes. Ask your doctor whether you should have tests for diabetes. Vision. Experts recommend that you have yearly exams for glaucoma and other age-related eye problems. Hearing. Tell your doctor if you notice any change in your hearing. You can have tests to find out how well you hear. Colon cancer tests. Keep having colon cancer tests as your doctor recommends. You can have one of several types of tests. Heart attack and stroke risk. At least every 4 to 6 years, you should have your risk for heart attack and stroke assessed. Your doctor uses factors such as your age, blood pressure, cholesterol, and whether you smoke or have diabetes to show what your risk for a heart attack or stroke is over the next 10 years. Osteoporosis. Talk to your doctor about whether you should have a bone density test to find out whether you have thinning bones. Also ask your doctor about whether you should take calcium and vitamin D supplements. For women  Pap test and pelvic exam. You may no longer need a Pap test. Talk with your doctor about whether to stop or continue to have Pap tests. Breast exam and mammogram. Ask how often you should have a mammogram, which is an X-ray of your breasts. A mammogram can spot breast cancer before it can be felt and when it is easiest to treat. Thyroid disease. Talk to your doctor about whether to have your thyroid checked as part of a regular physical exam. Women have an increased chance of a thyroid problem. For men  Prostate exam. Talk to your doctor about whether you should have a blood test (called a PSA test) for prostate cancer.  Experts recommend that you discuss the benefits and risks of the test with your doctor before you decide whether to have this test. Some experts say that men ages 79 and older no longer need testing. Abdominal aortic aneurysm. Ask your doctor whether you should have a test to check for an aneurysm. You may need a test if you ever smoked or if your parent, brother, sister, or child has had an aneurysm. When should you call for help? Watch closely for changes in your health, and be sure to contact your doctor if you have any problems or symptoms that concern you. Where can you learn more? Go to https://CalciMedicapepiceweb.CHIC.TV. org and sign in to your Neomobile account. Enter L238 in the [employer] box to learn more about \"Well Visit, Over 65: Care Instructions. \"     If you do not have an account, please click on the \"Sign Up Now\" link. Current as of: December 13, 2018  Content Version: 12.0  © 4456-9694 Healthwise, Parental Health. Care instructions adapted under license by [facility] ([facility]). If you have questions about a medical condition or this instruction, always ask your healthcare professional. [place] [address] any warranty or liability for your use of this information. Patient Education        High Blood Pressure: Care Instructions  Overview    It's normal for blood pressure to go up and down throughout the day. But if it stays up, you have high blood pressure. Another name for high blood pressure is hypertension. Despite what a lot of people think, high blood pressure usually doesn't cause headaches or make you feel dizzy or lightheaded. It usually has no symptoms. But it does increase your risk of stroke, heart attack, and other problems. You and your doctor will talk about your risks of these problems based on your blood pressure. Your doctor will give you a goal for your blood pressure. Your goal will be based on your health and your age. Lifestyle changes, such as eating healthy and being active, are always important to help lower blood pressure.  You might also take medicine to reach your blood pressure goal.  Follow-up care is a key part of your treatment and safety. Be sure to make and go to all appointments, and call your doctor if you are having problems. It's also a good idea to know your test results and keep a list of the medicines you take. How can you care for yourself at home? Medical treatment  If you stop taking your medicine, your blood pressure will go back up. You may take one or more types of medicine to lower your blood pressure. Be safe with medicines. Take your medicine exactly as prescribed. Call your doctor if you think you are having a problem with your medicine. Talk to your doctor before you start taking aspirin every day. Aspirin can help certain people lower their risk of a heart attack or stroke. But taking aspirin isn't right for everyone, because it can cause serious bleeding. See your doctor regularly. You may need to see the doctor more often at first or until your blood pressure comes down. If you are taking blood pressure medicine, talk to your doctor before you take decongestants or anti-inflammatory medicine, such as ibuprofen. Some of these medicines can raise blood pressure. Learn how to check your blood pressure at home. Lifestyle changes  Stay at a healthy weight. This is especially important if you put on weight around the waist. Losing even 10 pounds can help you lower your blood pressure. If your doctor recommends it, get more exercise. Walking is a good choice. Bit by bit, increase the amount you walk every day. Try for at least 30 minutes on most days of the week. You also may want to swim, bike, or do other activities. Avoid or limit alcohol. Talk to your doctor about whether you can drink any alcohol. Try to limit how much sodium you eat to less than 2,300 milligrams (mg) a day. Your doctor may ask you to try to eat less than 1,500 mg a day. Eat plenty of fruits (such as bananas and oranges), vegetables, legumes, whole grains, and low-fat dairy products. Lower the amount of saturated fat in your diet. Saturated fat is found in animal products such as milk, cheese, and meat. Limiting these foods may help you lose weight and also lower your risk for heart disease. Do not smoke. Smoking increases your risk for heart attack and stroke. If you need help quitting, talk to your doctor about stop-smoking programs and medicines. These can increase your chances of quitting for good. When should you call for help? Call 911 anytime you think you may need emergency care. This may mean having symptoms that suggest that your blood pressure is causing a serious heart or blood vessel problem. Your blood pressure may be over 180/120.   For example, call 911 if:    You have symptoms of a heart attack. These may include:  Chest pain or pressure, or a strange feeling in the chest.  Sweating. Shortness of breath. Nausea or vomiting. Pain, pressure, or a strange feeling in the back, neck, jaw, or upper belly or in one or both shoulders or arms. Lightheadedness or sudden weakness. A fast or irregular heartbeat.     You have symptoms of a stroke. These may include:  Sudden numbness, tingling, weakness, or loss of movement in your face, arm, or leg, especially on only one side of your body. Sudden vision changes. Sudden trouble speaking. Sudden confusion or trouble understanding simple statements. Sudden problems with walking or balance. A sudden, severe headache that is different from past headaches.     You have severe back or belly pain.    Do not wait until your blood pressure comes down on its own. Get help right away.   Call your doctor now or seek immediate care if:    Your blood pressure is much higher than normal (such as 180/120 or higher), but you don't have symptoms.     You think high blood pressure is causing symptoms, such as:  Severe headache.   Blurry vision.    Watch closely for changes in your health, and be sure to contact your doctor if:    Your blood pressure measures higher than your doctor recommends at call your doctor if you are having problems. It's also a good idea to know your test results and keep a list of the medicines you take. How can you care for yourself at home? Following the DASH diet  Eat 4 to 5 servings of fruit each day. A serving is 1 medium-sized piece of fruit, ½ cup chopped or canned fruit, 1/4 cup dried fruit, or 4 ounces (½ cup) of fruit juice. Choose fruit more often than fruit juice. Eat 4 to 5 servings of vegetables each day. A serving is 1 cup of lettuce or raw leafy vegetables, ½ cup of chopped or cooked vegetables, or 4 ounces (½ cup) of vegetable juice. Choose vegetables more often than vegetable juice. Get 2 to 3 servings of low-fat and fat-free dairy each day. A serving is 8 ounces of milk, 1 cup of yogurt, or 1 ½ ounces of cheese. Eat 6 to 8 servings of grains each day. A serving is 1 slice of bread, 1 ounce of dry cereal, or ½ cup of cooked rice, pasta, or cooked cereal. Try to choose whole-grain products as much as possible. Limit lean meat, poultry, and fish to 2 servings each day. A serving is 3 ounces, about the size of a deck of cards. Eat 4 to 5 servings of nuts, seeds, and legumes (cooked dried beans, lentils, and split peas) each week. A serving is 1/3 cup of nuts, 2 tablespoons of seeds, or ½ cup of cooked beans or peas. Limit fats and oils to 2 to 3 servings each day. A serving is 1 teaspoon of vegetable oil or 2 tablespoons of salad dressing. Limit sweets and added sugars to 5 servings or less a week. A serving is 1 tablespoon jelly or jam, ½ cup sorbet, or 1 cup of lemonade. Eat less than 2,300 milligrams (mg) of sodium a day. If you limit your sodium to 1,500 mg a day, you can lower your blood pressure even more. Tips for success  Start small. Do not try to make dramatic changes to your diet all at once. You might feel that you are missing out on your favorite foods and then be more likely to not follow the plan. Make small changes, and stick with them.  Once those changes become habit, add a few more changes. Try some of the following:  Make it a goal to eat a fruit or vegetable at every meal and at snacks. This will make it easy to get the recommended amount of fruits and vegetables each day. Try yogurt topped with fruit and nuts for a snack or healthy dessert. Add lettuce, tomato, cucumber, and onion to sandwiches. Combine a ready-made pizza crust with low-fat mozzarella cheese and lots of vegetable toppings. Try using tomatoes, squash, spinach, broccoli, carrots, cauliflower, and onions. Have a variety of cut-up vegetables with a low-fat dip as an appetizer instead of chips and dip. Sprinkle sunflower seeds or chopped almonds over salads. Or try adding chopped walnuts or almonds to cooked vegetables. Try some vegetarian meals using beans and peas. Add garbanzo or kidney beans to salads. Make burritos and tacos with mashed gaytan beans or black beans. Where can you learn more? Go to https://Justin.TVpepic4Cable TV.[employer] Corporation. org and sign in to your PublicStuff account. Enter Y195 in the [facility] box to learn more about \"DASH Diet: Care Instructions. \"     If you do not have an account, please click on the \"Sign Up Now\" link. Current as of: July 22, 2018  Content Version: 12.0  © 4194-7014 Expedit.us. Care instructions adapted under license by [facility] ([facility]). If you have questions about a medical condition or this instruction, always ask your healthcare professional. [place] [address] any warranty or liability for your use of this information. Patient Education        Leg and Ankle Edema: Care Instructions  Your Care Instructions  Swelling in the legs, ankles, and feet is called edema. It is common after you sit or stand for a while. Long plane flights or car rides often cause swelling in the legs and feet. You may also have swelling if you have to stand for long periods of time at your job.  Problems with the veins in the legs (varicose veins) and changes in hormones can also cause swelling. Sometimes the swelling in the ankles and feet is caused by a more serious problem, such as heart failure, infection, blood clots, or liver or kidney disease. Follow-up care is a key part of your treatment and safety. Be sure to make and go to all appointments, and call your doctor if you are having problems. It's also a good idea to know your test results and keep a list of the medicines you take. How can you care for yourself at home? If your doctor gave you medicine, take it as prescribed. Call your doctor if you think you are having a problem with your medicine. Whenever you are resting, raise your legs up. Try to keep the swollen area higher than the level of your heart. Take breaks from standing or sitting in one position. Walk around to increase the blood flow in your lower legs. Move your feet and ankles often while you stand, or tighten and relax your leg muscles. Wear support stockings. Put them on in the morning, before swelling gets worse. Eat a balanced diet. Lose weight if you need to. Limit the amount of salt (sodium) in your diet. Salt holds fluid in the body and may increase swelling. When should you call for help? Call 911 anytime you think you may need emergency care. For example, call if:    You have symptoms of a blood clot in your lung (called a pulmonary embolism). These may include:  Sudden chest pain. Trouble breathing. Coughing up blood.    Call your doctor now or seek immediate medical care if:    You have signs of a blood clot, such as:  Pain in your calf, back of the knee, thigh, or groin. Redness and swelling in your leg or groin.     You have symptoms of infection, such as: Increased pain, swelling, warmth, or redness. Red streaks or pus.   A fever.    Watch closely for changes in your health, and be sure to contact your doctor if:    Your swelling is getting worse.     You have new or worsening pain in your legs.     You do not get better as expected. Where can you learn more? Go to https://chpepiceweb.[employer]. org and sign in to your PayEase account. Enter Q167 in the [facility] box to learn more about \"Leg and Ankle Edema: Care Instructions. \"     If you do not have an account, please click on the \"Sign Up Now\" link. Current as of: September 23, 2018  Content Version: 12.0  © 3020-2534 Ifeelgoods. Care instructions adapted under license by [facility] ([facility]). If you have questions about a medical condition or this instruction, always ask your healthcare professional. [address] any warranty or liability for your use of this information. Patient Education        Type 2 Diabetes: Care Instructions  Your Care Instructions    Type 2 diabetes is a disease that develops when the body's tissues cannot use insulin properly. Over time, the pancreas cannot make enough insulin. Insulin is a hormone that helps the body's cells use sugar (glucose) for energy. It also helps the body store extra sugar in muscle, fat, and liver cells. Without insulin, the sugar cannot get into the cells to do its work. It stays in the blood instead. This can cause high blood sugar levels. A person has diabetes when the blood sugar stays too high too much of the time. Over time, diabetes can lead to diseases of the heart, blood vessels, nerves, kidneys, and eyes. You may be able to control your blood sugar by losing weight, eating a healthy diet, and getting daily exercise. You may also have to take insulin or other diabetes medicine. Follow-up care is a key part of your treatment and safety. Be sure to make and go to all appointments. Call your doctor if you are having problems. It's also a good idea to know your test results and keep a list of the medicines you take. How can you care for yourself at home?   Keep your blood sugar at a target level (which you set with also important to be aware of safe temperatures in the home. When helping someone bathe, use the back of your hand to test the water to make sure it's not too hot. Lower the temperature setting in the hot water heater to 120°F or lower to avoid burns. And make sure other liquids (such as coffee, tea, or soup) are not too hot. How can you protect from fire and carbon monoxide? Home safety alarms are very important. A smoke alarm can detect small amounts of smoke. This can allow time to escape from a fire. And a carbon monoxide alarm can let people know about this deadly gas before it starts to make them sick. Put smoke alarms: On each level of the home, in the hallway outside sleeping areas, and inside each bedroom. In the center of a ceiling, or on a wall 6 to 12 inches from the ceiling. This is where smoke goes first. Avoid places near doors, windows, or air ducts. Put a carbon monoxide alarm in the hallway outside of the bedrooms in each sleeping area of the house. The alarm should be placed high on the wall. Make sure that the alarm can't be covered up by furniture or drapes. Test alarms regularly by pressing the test button. Replace non-lithium batteries in alarms twice a year. Have a plan for getting out of the home if there is a fire. Practice by having a fire drill. Keep a fire extinguisher in the kitchen. What can you do to prevent falls? You can help prevent falls by keeping rooms uncluttered, with clear walkways around furniture. Keep electrical cords off the floor, and remove throw rugs to prevent tripping. If there are steps in the home, make sure they all have handrails, and always use the handrails. Don't leave items on the steps, and be sure to fix any that are loose, broken, or uneven. How can you increase safety for people with dementia? If you are caring for someone who has dementia, you may need to make some extra changes to create a safe home.  People with dementia have a loss of mental skills, such as memory, problem solving, and learning. So things that might not have been a danger to them before can cause safety problems now. Here are some things to consider:  Don't move furniture around. The person may become confused. Use locks on doors and cupboards. Lock up knives, scissors, medicines, cleaning supplies, and other dangerous items. Use hidden switches or controls for the stove, thermostat, water heater, and other appliances. If your loved one is still cooking, think about whether that is safe. It may be okay with some help, depending on your loved one's condition. But for people who have memory or thinking problems, it's best to avoid any activities that might not be safe. If the person tends to wander or to try to leave the home, install motion-sensor lights on all doors and windows. Have emergency numbers in a central area near a phone. Include 911 and numbers for the doctor and family members. Get medical alert jewelry for the person so you can be contacted if he or she wanders away. If possible, provide a safe place for wandering, such as an enclosed yard or garden. Where can you learn more? Go to https://TradeCard.edenes. org and sign in to your Gre[employer]ro Technologies account. Enter N688 in the [facility] box to learn more about \"Learning About How to Make a Home Safe. \"     If you do not have an account, please click on the \"Sign Up Now\" link. Current as of: April 18, 2018  Content Version: 12.0  © 5483-9252 Healthwise, Incorporated. Care instructions adapted under license by [facility] ([facility]). If you have questions about a medical condition or this instruction, always ask your healthcare professional. [place] [address] any warranty or liability for your use of this information.

## 2019-06-28 NOTE — PROGRESS NOTES
Subjective:      Patient ID: Xochitl Arias is a 66 y.o. female. HPI  Pt here for annual exam and med refills. Reviewed BMI of 37. Encouraged diet, exercise and weight loss. Reviewed health maintenance. Needs blood work. Increased swelling in the legs the last 4 to 5 days. No known injury. Otherwise is feeling well. , non smoker, pmh reviewed. Review of Systems   Constitutional: Negative for chills, fatigue, fever and unexpected weight change. HENT: Negative for congestion, ear pain, rhinorrhea and sore throat. Eyes: Negative for pain and visual disturbance. Respiratory: Negative for cough, chest tightness, shortness of breath and wheezing. Cardiovascular: Positive for leg swelling. Negative for chest pain and palpitations. Gastrointestinal: Negative for abdominal pain, blood in stool, constipation, diarrhea, nausea and vomiting. Genitourinary: Positive for frequency. Negative for difficulty urinating, hematuria and urgency. Musculoskeletal: Positive for arthralgias. Negative for back pain, joint swelling, myalgias and neck pain. Skin: Negative for rash. Neurological: Negative for dizziness and headaches. Hematological: Negative for adenopathy. Does not bruise/bleed easily. Psychiatric/Behavioral: Negative for behavioral problems and sleep disturbance. The patient is not nervous/anxious. Objective:   Physical Exam   Constitutional: She is oriented to person, place, and time. She appears well-developed and well-nourished. HENT:   Head: Normocephalic and atraumatic. Right Ear: External ear normal.   Left Ear: External ear normal.   Nose: Nose normal.   Mouth/Throat: Oropharynx is clear and moist.   Eyes: Pupils are equal, round, and reactive to light. EOM are normal.   Neck: Neck supple. Carotid bruit is not present. No thyromegaly present. Cardiovascular: Normal rate and regular rhythm. Murmur heard.    Systolic murmur is present with a grade of 3/6.  Pulmonary/Chest: Breath sounds normal. She has no wheezes. She has no rales. Abdominal: Soft. Bowel sounds are normal. There is no tenderness. There is no rebound and no guarding. Musculoskeletal: Normal range of motion. Right lower leg: She exhibits edema. Left lower leg: She exhibits edema. Bilateral 2+ pitting edema    No open areas on the feet. Sensation intact. Lymphadenopathy:     She has no cervical adenopathy. Neurological: She is alert and oriented to person, place, and time. She has normal reflexes. No cranial nerve deficit. Skin: Skin is warm and dry. No rash noted. Psychiatric: She has a normal mood and affect. Nursing note and vitals reviewed.     Health Maintenance   Topic Date Due    DTaP/Tdap/Td vaccine (1 - Tdap) 12/04/1959    Shingles Vaccine (1 of 2) 12/04/1990    Annual Wellness Visit (AWV)  03/15/2016    Flu vaccine (Season Ended) 09/01/2019    Potassium monitoring  09/21/2019    Creatinine monitoring  09/21/2019    Pneumococcal 65+ years Vaccine  Completed    DEXA (modify frequency per FRAX score)  Addressed     Lab Results   Component Value Date    CHOL 192 09/21/2018    CHOL 208 (H) 05/19/2017    CHOL 198 01/27/2016     Lab Results   Component Value Date    TRIG 100 09/21/2018    TRIG 125 05/19/2017    TRIG 153 01/27/2016     Lab Results   Component Value Date    HDL 58 09/21/2018    HDL 57 05/19/2017    HDL 52 01/27/2016     Lab Results   Component Value Date    LDLCALC 114 09/21/2018    LDLCALC 126 05/19/2017    LDLCALC 115 01/27/2016     Lab Results   Component Value Date    LABVLDL 26 03/16/2015    VLDL 31 03/20/2013     Lab Results   Component Value Date    CHOLHDLRATIO 3.4 03/16/2015    CHOLHDLRATIO 3.5 03/20/2013     Lab Results   Component Value Date     09/21/2018    K 3.9 09/21/2018     09/21/2018    CO2 26 09/21/2018    BUN 19 09/21/2018    CREATININE 0.6 09/21/2018    GLUCOSE 128 (H) 09/21/2018    CALCIUM 10.1 09/21/2018 PROT 7.5 2018    LABALBU 4.4 2018    BILITOT 0.6 2018    ALKPHOS 81 2018    AST 13 2018    ALT 6 (L) 2018    LABGLOM >90 2018       Results for orders placed or performed in visit on 19   POCT Urinalysis No Micro (Auto)   Result Value Ref Range    Glucose, Ur Negative NEGATIVE mg/dl    Bilirubin Urine Small (A)     Ketones, Urine Negative NEGATIVE    Specific Gravity, Urine >= 1.030 1.002 - 1.03    Blood, UA POC Small (A) NEGATIVE    pH, Urine 5.00 5.0 - 9.0    Protein, Urine Trace (A) NEGATIVE mg/dl    Urobilinogen, Urine 0.20 0.0 - 1.0 eu/dl    Nitrite, Urine Negative NEGATIVE    Leukocyte Clumps, Urine Large (A) NEGATIVE    Color, Urine Dark yellow (A) YELLOW-STR    Character, Urine Cloudy (A) CLR-SL.LANCE   POCT microalbumin   Result Value Ref Range    MICROALBUMIN POC 30 mg/L    POC Creatinine 200 mg/dL    Microalb/Creat Ratio POC < 30 <30 mg/g         Assessment:      Essential HTN  Urinary frequency  Peripheral edema  UTI with hematuria      Plan:      Refill meds  Blood work'  Encouraged diet, exercise and weight loss. Dash diet  Reviewed health maintenance  U/a--positive--cipro 500 mg BID x 10 days  Monitor edema    Jose J Ortiz received counseling on the following healthy behaviors: nutrition, exercise and medication adherence    Patient given educational materials on Diabetes, Nutrition, Exercise and Hypertension    I have instructed Jose J Ortiz to complete a self tracking handout on Blood Sugars , Blood Pressures  and Weights and instructed them to bring it with them to her next appointment. Discussed use, benefit, and side effects of prescribed medications. Barriers to medication compliance addressed. All patient questions answered. Pt voiced understanding. Bell Bustillo MD   Medicare Annual Wellness Visit  Name: Kamla Maxwell Date: 2019   MRN: [id number] Sex: Female   Age: 66 y.o.  Ethnicity: Non-/Non    : 1940 Race: Saundra Cedeño is here for Medicare AWV (medication refills, bilateral leg/feet swelling)    Screenings for behavioral, psychosocial and functional/safety risks, and cognitive dysfunction are all negative except as indicated below. These results, as well as other patient data from the [address] form, are documented in Flowsheets linked to this Encounter. Allergies   Allergen Reactions    Adhesive Tape      Other reaction(s): Blisters  Pulls skin off     Prior to Visit Medications    Medication Sig Taking? Authorizing Provider   aspirin 81 MG EC tablet Take 1 tablet by mouth daily Restart after lovenox done Yes Rachid Haney MD   amLODIPine (NORVASC) 10 MG tablet Take 1 tablet by mouth nightly Yes Benja Hewitt MD   glucose blood VI test strips (ASCENSIA AUTODISC VI;ONE TOUCH ULTRA TEST VI) strip 1 each by In Vitro route daily Dx: E11.9, check BS daily, uses one touch ultra strips. Yes Benja Hewitt MD   Lancets MISC 1 each by Does not apply route daily Dx: E11.9, uses deliza lancets, check bs daily. Yes Benja Hewitt MD   valsartan-hydrochlorothiazide (DIOVAN HCT) 160-12.5 MG per tablet Take 1 tablet by mouth daily Yes Benja Hewitt MD   potassium chloride (KLOR-CON) 10 MEQ extended release tablet Take 1 tablet by mouth daily Yes Benja Hewitt MD   metoprolol succinate (TOPROL XL) 25 MG extended release tablet Take 1 tablet by mouth daily Yes Benja Hewitt MD   metFORMIN (GLUCOPHAGE) 500 MG tablet Take two tablets twice daily.  Yes Benja Hewitt MD   Blood Glucose Monitoring Suppl KIT Check BS daily, Dx E11.9 Yes Benja Hewitt MD     Past Medical History:   Diagnosis Date    Arthritis     hands, knee    Breast cancer ([facility] [address]     Right Breast    CAD (coronary artery disease)     Cochlear implant in place 2009    Left    [place] (hard of hearing)     Severe    Hypertension     Type II or unspecified type diabetes mellitus without have a Living Will?: Yes  General Health Risk Interventions:  · Stress: son is aking for money    Health Habits/Nutrition:  Health Habits/Nutrition  Do you exercise for at least 20 minutes 2-3 times per week?: (!) No  Have you lost any weight without trying in the past 3 months?: No  Do you eat fewer than 2 meals per day?: No  Have you seen a dentist within the past year?: Yes  Body mass index is 36.65 kg/m².   Health Habits/Nutrition Interventions:  · Inadequate physical activity:  patient is not ready to increase his/her physical activity level at this time    Hearing/Vision:  Hearing/Vision  Do you or your family notice any trouble with your hearing?: (!) Yes  Do you have difficulty driving, watching TV, or doing any of your daily activities because of your eyesight?: No  Have you had an eye exam within the past year?: (!) No  Hearing/Vision Interventions:  · Hearing concerns:  has cochlear implant  · Vision concerns:  patient encouraged to make appointment with his/her eye specialist    Safety:  Safety  Do you have working smoke detectors?: Yes  Have all throw rugs been removed or fastened?: Yes  Do you have non-slip mats in all bathtubs?: (!) No  Do all of your stairways have a railing or banister?: Yes  Are your doorways, halls and stairs free of clutter?: Yes  Do you always fasten your seatbelt when you are in a car?: Yes  Safety Interventions:  · Home safety tips provided    Personalized Preventive Plan   Current Health Maintenance Status  Immunization History   Administered Date(s) Administered    Meningococcal MCV4P (Menactra) 12/28/2011    Pneumococcal Conjugate 13-valent (Amanda Billow) 04/14/2016    Pneumococcal Polysaccharide ([id number]) 06/10/2010        Health Maintenance   Topic Date Due    DTaP/Tdap/Td vaccine (1 - Tdap) 12/04/1959    Shingles Vaccine (1 of 2) 12/04/1990    Annual Wellness Visit (AWV)  03/15/2016    Flu vaccine (Season Ended) 09/01/2019    Potassium monitoring  09/21/2019    Creatinine monitoring  09/21/2019    Pneumococcal 65+ years Vaccine  Completed    DEXA (modify frequency per FRAX score)  Addressed     Recommendations for Preventive Services Due: see orders and patient instructions/AVS.  .   Recommended screening schedule for the next 5-10 years is provided to the patient in written form: see Patient Instructions/AVS.

## 2019-06-30 LAB
ORGANISM: ABNORMAL
URINE CULTURE, ROUTINE: ABNORMAL

## 2019-07-01 ENCOUNTER — TELEPHONE (OUTPATIENT)
Dept: FAMILY MEDICINE CLINIC | Age: 79
End: 2019-07-01

## 2019-07-01 DIAGNOSIS — E11.9 TYPE 2 DIABETES MELLITUS WITHOUT COMPLICATION, WITHOUT LONG-TERM CURRENT USE OF INSULIN (HCC): Primary | ICD-10-CM

## 2019-07-15 ENCOUNTER — HOSPITAL ENCOUNTER (OUTPATIENT)
Dept: WOMENS IMAGING | Age: 79
Discharge: HOME | End: 2019-07-15
Payer: MEDICARE

## 2019-07-15 DIAGNOSIS — Z12.31 VISIT FOR SCREENING MAMMOGRAM: ICD-10-CM

## 2019-07-15 PROCEDURE — 77063 BREAST TOMOSYNTHESIS BI: CPT

## 2019-09-20 ENCOUNTER — APPOINTMENT (OUTPATIENT)
Dept: GENERAL RADIOLOGY | Age: 79
End: 2019-09-20
Payer: MEDICARE

## 2019-09-20 ENCOUNTER — HOSPITAL ENCOUNTER (OUTPATIENT)
Age: 79
Setting detail: OBSERVATION
Discharge: HOME | End: 2019-09-21
Attending: EMERGENCY MEDICINE | Admitting: HOSPITALIST
Payer: MEDICARE

## 2019-09-20 DIAGNOSIS — R07.9 CHEST PAIN, MODERATE CORONARY ARTERY RISK: ICD-10-CM

## 2019-09-20 DIAGNOSIS — R07.9 CHEST PAIN WITH MODERATE RISK FOR CARDIAC ETIOLOGY: Primary | ICD-10-CM

## 2019-09-20 LAB
ANION GAP SERPL CALCULATED.3IONS-SCNC: 15 MEQ/L (ref 8–16)
BASOPHILS # BLD: 0.8 %
BASOPHILS ABSOLUTE: 0.1 THOU/MM3 (ref 0–0.1)
BUN BLDV-MCNC: 15 MG/DL (ref 7–22)
CALCIUM SERPL-MCNC: 9.7 MG/DL (ref 8.5–10.5)
CHLORIDE BLD-SCNC: 99 MEQ/L (ref 98–111)
CO2: 25 MEQ/L (ref 23–33)
CREAT SERPL-MCNC: 0.7 MG/DL (ref 0.4–1.2)
EKG ATRIAL RATE: 104 BPM
EKG P AXIS: 29 DEGREES
EKG P-R INTERVAL: 168 MS
EKG Q-T INTERVAL: 386 MS
EKG QRS DURATION: 148 MS
EKG QTC CALCULATION (BAZETT): 507 MS
EKG R AXIS: -62 DEGREES
EKG T AXIS: 48 DEGREES
EKG VENTRICULAR RATE: 104 BPM
EOSINOPHIL # BLD: 3.9 %
EOSINOPHILS ABSOLUTE: 0.3 THOU/MM3 (ref 0–0.4)
ERYTHROCYTE [DISTWIDTH] IN BLOOD BY AUTOMATED COUNT: 14.3 % (ref 11.5–14.5)
ERYTHROCYTE [DISTWIDTH] IN BLOOD BY AUTOMATED COUNT: 45.7 FL (ref 35–45)
GFR SERPL CREATININE-BSD FRML MDRD: 81 ML/MIN/1.73M2
GLUCOSE BLD-MCNC: 170 MG/DL (ref 70–108)
HCT VFR BLD CALC: 38.3 % (ref 37–47)
HEMOGLOBIN: 12.3 GM/DL (ref 12–16)
IMMATURE GRANS (ABS): 0.02 THOU/MM3 (ref 0–0.07)
IMMATURE GRANULOCYTES: 0 %
LYMPHOCYTES # BLD: 33.8 %
LYMPHOCYTES ABSOLUTE: 2.4 THOU/MM3 (ref 1–4.8)
MCH RBC QN AUTO: 28.4 PG (ref 26–33)
MCHC RBC AUTO-ENTMCNC: 32.1 GM/DL (ref 32.2–35.5)
MCV RBC AUTO: 88.5 FL (ref 81–99)
MONOCYTES # BLD: 10.6 %
MONOCYTES ABSOLUTE: 0.8 THOU/MM3 (ref 0.4–1.3)
NUCLEATED RED BLOOD CELLS: 0 /100 WBC
OSMOLALITY CALCULATION: 282.3 MOSMOL/KG (ref 275–300)
PLATELET # BLD: 212 THOU/MM3 (ref 130–400)
PMV BLD AUTO: 9.6 FL (ref 9.4–12.4)
POTASSIUM REFLEX MAGNESIUM: 3.6 MEQ/L (ref 3.5–5.2)
PRO-BNP: 117.8 PG/ML (ref 0–1800)
RBC # BLD: 4.33 MILL/MM3 (ref 4.2–5.4)
SEG NEUTROPHILS: 50.6 %
SEGMENTED NEUTROPHILS ABSOLUTE COUNT: 3.6 THOU/MM3 (ref 1.8–7.7)
SODIUM BLD-SCNC: 139 MEQ/L (ref 135–145)
TROPONIN T: < 0.01 NG/ML
WBC # BLD: 7.2 THOU/MM3 (ref 4.8–10.8)

## 2019-09-20 PROCEDURE — 93005 ELECTROCARDIOGRAM TRACING: CPT | Performed by: EMERGENCY MEDICINE

## 2019-09-20 PROCEDURE — 85025 COMPLETE CBC W/AUTO DIFF WBC: CPT

## 2019-09-20 PROCEDURE — 80048 BASIC METABOLIC PNL TOTAL CA: CPT

## 2019-09-20 PROCEDURE — 99219 PR INITIAL OBSERVATION CARE/DAY 50 MINUTES: CPT | Performed by: HOSPITALIST

## 2019-09-20 PROCEDURE — 71045 X-RAY EXAM CHEST 1 VIEW: CPT

## 2019-09-20 PROCEDURE — 83880 ASSAY OF NATRIURETIC PEPTIDE: CPT

## 2019-09-20 PROCEDURE — 84484 ASSAY OF TROPONIN QUANT: CPT

## 2019-09-20 PROCEDURE — G0378 HOSPITAL OBSERVATION PER HR: HCPCS

## 2019-09-20 PROCEDURE — 99285 EMERGENCY DEPT VISIT HI MDM: CPT

## 2019-09-20 PROCEDURE — 36415 COLL VENOUS BLD VENIPUNCTURE: CPT

## 2019-09-20 PROCEDURE — 6370000000 HC RX 637 (ALT 250 FOR IP): Performed by: EMERGENCY MEDICINE

## 2019-09-20 RX ORDER — ASPIRIN 81 MG/1
324 TABLET, CHEWABLE ORAL ONCE
Status: COMPLETED | OUTPATIENT
Start: 2019-09-20 | End: 2019-09-20

## 2019-09-20 RX ADMIN — ASPIRIN 81 MG 324 MG: 81 TABLET ORAL at 22:49

## 2019-09-20 ASSESSMENT — PAIN DESCRIPTION - ONSET: ONSET: ON-GOING

## 2019-09-20 ASSESSMENT — PAIN SCALES - GENERAL: PAINLEVEL_OUTOF10: 5

## 2019-09-20 ASSESSMENT — PAIN DESCRIPTION - FREQUENCY: FREQUENCY: CONTINUOUS

## 2019-09-20 ASSESSMENT — PAIN DESCRIPTION - PAIN TYPE: TYPE: ACUTE PAIN

## 2019-09-20 ASSESSMENT — PAIN DESCRIPTION - DESCRIPTORS: DESCRIPTORS: PRESSURE

## 2019-09-20 ASSESSMENT — PAIN DESCRIPTION - ORIENTATION: ORIENTATION: LEFT

## 2019-09-20 ASSESSMENT — PAIN DESCRIPTION - LOCATION: LOCATION: CHEST

## 2019-09-20 ASSESSMENT — PAIN DESCRIPTION - PROGRESSION: CLINICAL_PROGRESSION: GRADUALLY WORSENING

## 2019-09-21 VITALS
WEIGHT: 222.88 LBS | DIASTOLIC BLOOD PRESSURE: 62 MMHG | RESPIRATION RATE: 16 BRPM | OXYGEN SATURATION: 94 % | HEIGHT: 64 IN | SYSTOLIC BLOOD PRESSURE: 140 MMHG | BODY MASS INDEX: 38.05 KG/M2 | TEMPERATURE: 98 F | HEART RATE: 82 BPM

## 2019-09-21 PROBLEM — R07.9 CHEST PAIN: Status: RESOLVED | Noted: 2019-09-20 | Resolved: 2019-09-21

## 2019-09-21 LAB
ALBUMIN SERPL-MCNC: 3.9 G/DL (ref 3.5–5.1)
ALP BLD-CCNC: 68 U/L (ref 38–126)
ALT SERPL-CCNC: 8 U/L (ref 11–66)
ANION GAP SERPL CALCULATED.3IONS-SCNC: 13 MEQ/L (ref 8–16)
AST SERPL-CCNC: 13 U/L (ref 5–40)
BILIRUB SERPL-MCNC: 0.3 MG/DL (ref 0.3–1.2)
BUN BLDV-MCNC: 12 MG/DL (ref 7–22)
CALCIUM SERPL-MCNC: 9.5 MG/DL (ref 8.5–10.5)
CHLORIDE BLD-SCNC: 103 MEQ/L (ref 98–111)
CO2: 26 MEQ/L (ref 23–33)
CREAT SERPL-MCNC: 0.6 MG/DL (ref 0.4–1.2)
ERYTHROCYTE [DISTWIDTH] IN BLOOD BY AUTOMATED COUNT: 14.1 % (ref 11.5–14.5)
ERYTHROCYTE [DISTWIDTH] IN BLOOD BY AUTOMATED COUNT: 45.1 FL (ref 35–45)
GFR SERPL CREATININE-BSD FRML MDRD: > 90 ML/MIN/1.73M2
GLUCOSE BLD-MCNC: 122 MG/DL (ref 70–108)
HCT VFR BLD CALC: 37.7 % (ref 37–47)
HEMOGLOBIN: 11.9 GM/DL (ref 12–16)
MCH RBC QN AUTO: 28 PG (ref 26–33)
MCHC RBC AUTO-ENTMCNC: 31.6 GM/DL (ref 32.2–35.5)
MCV RBC AUTO: 88.7 FL (ref 81–99)
PLATELET # BLD: 193 THOU/MM3 (ref 130–400)
PMV BLD AUTO: 9.3 FL (ref 9.4–12.4)
POTASSIUM REFLEX MAGNESIUM: 4 MEQ/L (ref 3.5–5.2)
RBC # BLD: 4.25 MILL/MM3 (ref 4.2–5.4)
SODIUM BLD-SCNC: 142 MEQ/L (ref 135–145)
TOTAL PROTEIN: 6.6 G/DL (ref 6.1–8)
TROPONIN T: < 0.01 NG/ML
TROPONIN T: < 0.01 NG/ML
WBC # BLD: 6.6 THOU/MM3 (ref 4.8–10.8)

## 2019-09-21 PROCEDURE — 99217 PR OBSERVATION CARE DISCHARGE MANAGEMENT: CPT | Performed by: HOSPITALIST

## 2019-09-21 PROCEDURE — 85027 COMPLETE CBC AUTOMATED: CPT

## 2019-09-21 PROCEDURE — 80053 COMPREHEN METABOLIC PANEL: CPT

## 2019-09-21 PROCEDURE — 84484 ASSAY OF TROPONIN QUANT: CPT

## 2019-09-21 PROCEDURE — 2580000003 HC RX 258: Performed by: HOSPITALIST

## 2019-09-21 PROCEDURE — 2709999900 HC NON-CHARGEABLE SUPPLY

## 2019-09-21 PROCEDURE — 36415 COLL VENOUS BLD VENIPUNCTURE: CPT

## 2019-09-21 PROCEDURE — G0378 HOSPITAL OBSERVATION PER HR: HCPCS

## 2019-09-21 PROCEDURE — 96372 THER/PROPH/DIAG INJ SC/IM: CPT

## 2019-09-21 PROCEDURE — 6360000002 HC RX W HCPCS: Performed by: HOSPITALIST

## 2019-09-21 RX ORDER — ASPIRIN 81 MG/1
81 TABLET ORAL DAILY
Status: DISCONTINUED | OUTPATIENT
Start: 2019-09-21 | End: 2019-09-21 | Stop reason: HOSPADM

## 2019-09-21 RX ORDER — SODIUM CHLORIDE 0.9 % (FLUSH) 0.9 %
10 SYRINGE (ML) INJECTION EVERY 12 HOURS SCHEDULED
Status: DISCONTINUED | OUTPATIENT
Start: 2019-09-21 | End: 2019-09-21 | Stop reason: HOSPADM

## 2019-09-21 RX ORDER — AMLODIPINE BESYLATE 10 MG/1
10 TABLET ORAL NIGHTLY
Status: DISCONTINUED | OUTPATIENT
Start: 2019-09-21 | End: 2019-09-21 | Stop reason: HOSPADM

## 2019-09-21 RX ORDER — ONDANSETRON 2 MG/ML
4 INJECTION INTRAMUSCULAR; INTRAVENOUS EVERY 6 HOURS PRN
Status: DISCONTINUED | OUTPATIENT
Start: 2019-09-21 | End: 2019-09-21 | Stop reason: HOSPADM

## 2019-09-21 RX ORDER — VALSARTAN AND HYDROCHLOROTHIAZIDE 160; 12.5 MG/1; MG/1
1 TABLET, FILM COATED ORAL DAILY
Status: DISCONTINUED | OUTPATIENT
Start: 2019-09-21 | End: 2019-09-21 | Stop reason: HOSPADM

## 2019-09-21 RX ORDER — SODIUM CHLORIDE 0.9 % (FLUSH) 0.9 %
10 SYRINGE (ML) INJECTION PRN
Status: DISCONTINUED | OUTPATIENT
Start: 2019-09-21 | End: 2019-09-21 | Stop reason: HOSPADM

## 2019-09-21 RX ORDER — POTASSIUM CHLORIDE 20 MEQ/1
40 TABLET, EXTENDED RELEASE ORAL PRN
Status: DISCONTINUED | OUTPATIENT
Start: 2019-09-21 | End: 2019-09-21 | Stop reason: HOSPADM

## 2019-09-21 RX ORDER — METOPROLOL SUCCINATE 25 MG/1
25 TABLET, EXTENDED RELEASE ORAL DAILY
Status: DISCONTINUED | OUTPATIENT
Start: 2019-09-21 | End: 2019-09-21 | Stop reason: HOSPADM

## 2019-09-21 RX ORDER — ACETAMINOPHEN 325 MG/1
650 TABLET ORAL EVERY 6 HOURS PRN
Status: DISCONTINUED | OUTPATIENT
Start: 2019-09-21 | End: 2019-09-21 | Stop reason: HOSPADM

## 2019-09-21 RX ORDER — POTASSIUM CHLORIDE 7.45 MG/ML
10 INJECTION INTRAVENOUS PRN
Status: DISCONTINUED | OUTPATIENT
Start: 2019-09-21 | End: 2019-09-21 | Stop reason: HOSPADM

## 2019-09-21 RX ADMIN — SODIUM CHLORIDE, PRESERVATIVE FREE 10 ML: 5 INJECTION INTRAVENOUS at 12:10

## 2019-09-21 RX ADMIN — VALSARTAN AND HYDROCHLOROTHIAZIDE 1 TABLET: 160; 12.5 TABLET, FILM COATED ORAL at 09:13

## 2019-09-21 RX ADMIN — METOPROLOL SUCCINATE 25 MG: 25 TABLET, EXTENDED RELEASE ORAL at 09:13

## 2019-09-21 RX ADMIN — ENOXAPARIN SODIUM 40 MG: 40 INJECTION SUBCUTANEOUS at 11:46

## 2019-09-21 SDOH — HEALTH STABILITY: MENTAL HEALTH: HOW OFTEN DO YOU HAVE A DRINK CONTAINING ALCOHOL?: NEVER

## 2019-09-21 ASSESSMENT — ENCOUNTER SYMPTOMS
ABDOMINAL PAIN: 0
SHORTNESS OF BREATH: 0
EYE DISCHARGE: 0
NAUSEA: 0
COUGH: 0
DIARRHEA: 0
EYE ITCHING: 0
RHINORRHEA: 0
WHEEZING: 0
VOMITING: 0
ABDOMINAL DISTENTION: 0

## 2019-09-21 ASSESSMENT — PAIN SCALES - GENERAL
PAINLEVEL_OUTOF10: 0
PAINLEVEL_OUTOF10: 0

## 2019-09-21 NOTE — PLAN OF CARE
Problem: Falls - Risk of:  Goal: Will remain free from falls  Description  Will remain free from falls  Outcome: Ongoing  Note:   Patient has remained free from falls this shift. Patient oriented to room and has call light and possessions in reach. Patient was instructed to use call light when in need of assistance. Patient verbalized understanding. Will continue to monitor. Care plan reviewed with patient. Patient verbalize understanding of the plan of care and contribute to goal setting.

## 2019-09-21 NOTE — H&P
History & Physical        Patient:  Radhames Groves  YOB: 1940    MRN: [id number]     Acct: [de-identified]    PCP: Monica Wang MD    Date of Admission: 9/20/2019    Date of Service: Pt seen/examined on 09/21/19  and Admitted to Observation with expected LOS less than two midnights due to medical therapy. Chief Complaint:  Chset pain      History Of Present Illness:      66 y.o. female presented to [facility] with chest pain. Patient's pain started around 6PM when she was at rest at home. Pain lasted for about 15 minutes and it improved spontaneously prior to getting to ER. Pain was heaviness in nature at the center of her chest.  No significant radiation. Never had this type of chest pain. Patient's last cardiac work up was many years ago. Patient currently feels comfortable. No chest pain. No fever, chills, nausea/vomting. No shortness of breath. 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 ([address]     Type II or unspecified type diabetes mellitus without mention of complication, not stated as uncontrolled     Varicose vein of leg 12/2016       Past Surgical History:          Procedure Laterality Date    BREAST LUMPECTOMY  2005    Right    COCHLEAR IMPLANT Left     raven    GALLBLADDER SURGERY      HYSTERECTOMY      DE TOTAL KNEE ARTHROPLASTY Right 5/1/2018    RIGHT KNEE TOTAL ARTHROPLASTY, LEFT KNEE INJECTION performed by Cash Mckoy [facility] at [facility] Bilateral 01/2017    [facility]       Medications Prior to Admission:      Prior to Admission medications    Medication Sig Start Date End Date Taking?  Authorizing Provider   metFORMIN (GLUCOPHAGE) 500 MG tablet Take two tablets twice daily. 6/28/19  Yes Shivani Fraser MD   metoprolol succinate (TOPROL XL) 25 MG extended release tablet Take 1 tablet by mouth daily 6/28/19  Yes Shivani Fraser MD   potassium chloride (KLOR-CON) 10 MEQ extended release tablet Take 1 tablet by mouth daily 6/28/19  Yes Shivani Fraser MD   valsartan-hydrochlorothiazide (DIOVAN HCT) 160-12.5 MG per tablet Take 1 tablet by mouth daily 6/28/19  Yes Shivani Fraser MD   amLODIPine (NORVASC) 10 MG tablet Take 1 tablet by mouth nightly 6/28/19  Yes Shivani Fraser MD   aspirin 81 MG EC tablet Take 1 tablet by mouth daily Restart after lovenox done  Patient taking differently: Take 81 mg by mouth nightly Restart after lovenox done 6/28/19  Yes Shivani Fraser MD   Lancets MISC 1 each by Does not apply route daily Dx: E11.9, uses deliza lancets, check bs daily. 6/28/19   Shivani Fraser MD   blood glucose test strips (ASCENSIA AUTODISC VI;ONE TOUCH ULTRA TEST VI) strip 1 each by In Vitro route daily Dx: E11.9, check BS daily, uses one touch ultra strips. 6/28/19   Shivani Fraser MD   Blood Glucose Monitoring Suppl KIT Check BS daily, Dx E11.9 9/26/16   Shivani Fraser MD       Allergies:  Adhesive tape    Social History:      The patient currently lives at home    TOBACCO:   reports that she has never smoked. She has never used smokeless tobacco.  ETOH:   reports that she does not drink alcohol. Family History:      Reviewed in detail and Positive as follows:        Problem Relation Age of Onset    Heart Disease Mother     High Blood Pressure Mother     High Blood Pressure Father        Diet:  DIET CARB CONTROL;    REVIEW OF SYSTEMS:   Pertinent positives as noted in the HPI. All other systems reviewed and negative. PHYSICAL EXAM:    /73   Pulse 78   Temp 97.8 °F (36.6 °C) (Oral)   Resp 15   Ht 5' 4\" (1.626 m)   Wt 222 lb 14.2 oz (101.1 kg)   LMP  (LMP Unknown)   SpO2 94%   Breastfeeding?  No

## 2019-09-21 NOTE — DISCHARGE SUMMARY
CO2 25 23 - 33 meq/L    Glucose 170 (H) 70 - 108 mg/dL    BUN 15 7 - 22 mg/dL    CREATININE 0.7 0.4 - 1.2 mg/dL    Calcium 9.7 8.5 - 10.5 mg/dL   Troponin   Result Value Ref Range    Troponin T < 0.010 ng/ml   Anion Gap   Result Value Ref Range    Anion Gap 15.0 8.0 - 16.0 meq/L   Glomerular Filtration Rate, Estimated   Result Value Ref Range    Est, Glom Filt Rate 81 (A) ml/min/1.73m2   Osmolality   Result Value Ref Range    Osmolality Calc 282.3 275.0 - 300 mOsmol/kg   CBC   Result Value Ref Range    WBC 6.6 4.8 - 10.8 thou/mm3    RBC 4.25 4.20 - 5.40 mill/mm3    Hemoglobin 11.9 (L) 12.0 - 16.0 gm/dl    Hematocrit 37.7 37.0 - 47.0 %    MCV 88.7 81.0 - 99.0 fL    MCH 28.0 26.0 - 33.0 pg    MCHC 31.6 (L) 32.2 - 35.5 gm/dl    RDW-CV 14.1 11.5 - 14.5 %    RDW-SD 45.1 (H) 35.0 - 45.0 fL    Platelets 117 575 - 548 thou/mm3    MPV 9.3 (L) 9.4 - 12.4 fL   Comprehensive Metabolic Panel w/ Reflex to MG   Result Value Ref Range    Glucose 122 (H) 70 - 108 mg/dL    CREATININE 0.6 0.4 - 1.2 mg/dL    BUN 12 7 - 22 mg/dL    Sodium 142 135 - 145 meq/L    Potassium reflex Magnesium 4.0 3.5 - 5.2 meq/L    Chloride 103 98 - 111 meq/L    CO2 26 23 - 33 meq/L    Calcium 9.5 8.5 - 10.5 mg/dL    AST 13 5 - 40 U/L    Alkaline Phosphatase 68 38 - 126 U/L    Total Protein 6.6 6.1 - 8.0 g/dL    Alb 3.9 3.5 - 5.1 g/dL    Total Bilirubin 0.3 0.3 - 1.2 mg/dL    ALT 8 (L) 11 - 66 U/L   Anion Gap   Result Value Ref Range    Anion Gap 13.0 8.0 - 16.0 meq/L   Glomerular Filtration Rate, Estimated   Result Value Ref Range    Est, Glom Filt Rate >90 ml/min/1.73m2   Troponin   Result Value Ref Range    Troponin T < 0.010 ng/ml   EKG 12 Lead   Result Value Ref Range    Ventricular Rate 104 BPM    Atrial Rate 104 BPM    P-R Interval 168 ms    QRS Duration 148 ms    Q-T Interval 386 ms    QTc Calculation (Bazett) 507 ms    P Axis 29 degrees    R Axis -62 degrees    T Axis 48 degrees       Diet:  DIET CARB CONTROL;     Activity:  Up ad nahomy (Patient can move independently)    Follow-up:  in 1-2 weeks with Hesham Blue MD    Disposition: home    Condition: Stable    Time Spent: 35 minutes    Electronically signed by Eze Comer MD on 9/21/2019 at 2:47 PM    Discharging Hospitalist

## 2019-09-21 NOTE — ED PROVIDER NOTES
Basophils Absolute 0.1 0.0 - 0.1 thou/mm3    Immature Grans (Abs) 0.02 0.00 - 0.07 thou/mm3    nRBC 0 /100 wbc   Brain Natriuretic Peptide   Result Value Ref Range    Pro-.8 0.0 - 1800.0 pg/mL   Basic Metabolic Panel w/ Reflex to MG   Result Value Ref Range    Sodium 139 135 - 145 meq/L    Potassium reflex Magnesium 3.6 3.5 - 5.2 meq/L    Chloride 99 98 - 111 meq/L    CO2 25 23 - 33 meq/L    Glucose 170 (H) 70 - 108 mg/dL    BUN 15 7 - 22 mg/dL    CREATININE 0.7 0.4 - 1.2 mg/dL    Calcium 9.7 8.5 - 10.5 mg/dL   Troponin   Result Value Ref Range    Troponin T < 0.010 ng/ml   Anion Gap   Result Value Ref Range    Anion Gap 15.0 8.0 - 16.0 meq/L   Glomerular Filtration Rate, Estimated   Result Value Ref Range    Est, Glom Filt Rate 81 (A) ml/min/1.73m2   Osmolality   Result Value Ref Range    Osmolality Calc 282.3 275.0 - 300 mOsmol/kg   EKG 12 Lead   Result Value Ref Range    Ventricular Rate 104 BPM    Atrial Rate 104 BPM    P-R Interval 168 ms    QRS Duration 148 ms    Q-T Interval 386 ms    QTc Calculation (Bazett) 507 ms    P Axis 29 degrees    R Axis -62 degrees    T Axis 48 degrees       EMERGENCY DEPARTMENT COURSE:   Vitals:    Vitals:    09/20/19 2211 09/20/19 2249 09/21/19 0007 09/21/19 0321   BP: 134/78 137/76 (!) 148/86 116/73   Pulse: 93 93 95 78   Resp: 18 14 14 15   Temp:   98.6 °F (37 °C) 97.8 °F (36.6 °C)   TempSrc:   Oral Oral   SpO2: 96% 96% 94% 94%   Weight:   222 lb 14.2 oz (101.1 kg)    Height:           20: 40    Patient is seen and evaluated in a timely fashion. EKG is nonischemic. Cynthiafort Making    ED cardiac workups are negative. She has chest pain with multiple CAD risk factor and never had cardiac workups. Admission warrants.  mg in ED    Admitted to Hospitalist service. CRITICAL CARE:   None    CONSULTS:  Dr. Farzad Braxtonist:  None    FINAL IMPRESSION      1. Chest pain with moderate risk for cardiac etiology    2.  Chest pain, moderate coronary

## 2019-09-21 NOTE — PLAN OF CARE
Problem: Discharge Planning:  Goal: Discharged to appropriate level of care  Description  Discharged to appropriate level of care  Outcome: Ongoing  Note:   Patient plans to return to home upon discharge. Care plan reviewed with patient. Patient verbalize understanding of the plan of care and contribute to goal setting.

## 2019-09-23 ENCOUNTER — TELEPHONE (OUTPATIENT)
Dept: FAMILY MEDICINE CLINIC | Age: 79
End: 2019-09-23

## 2019-09-23 NOTE — TELEPHONE ENCOUNTER
[facility] Transitions Initial Follow Up Call    Outreach made within 2 business days of discharge: Yes    Patient: Phani Carrion Patient : 1940   MRN: [id number]  Reason for Admission: There are no discharge diagnoses documented for the most recent discharge. Discharge Date: 19       Spoke with: No answer will try again later     Discharge department/facility: [facility]     Follow Up  No future appointments.     Homer Escobedo LPN

## 2020-01-02 NOTE — TELEPHONE ENCOUNTER
Florykiersten Panda [place] needs refill of   Requested Prescriptions     Pending Prescriptions Disp Refills    ONE TOUCH ULTRA TEST strip [Pharmacy Med Name: ONE TOUCH ULTRA BLUE TEST STRP] 100 strip 1     Sig: CHECK BS DAILY       Last Filled on:  6/28/19 100*1    Last Visit Date:  6/28/2019-EV    Next Visit Date:    Visit date not found

## 2020-02-21 ENCOUNTER — OFFICE VISIT (OUTPATIENT)
Dept: FAMILY MEDICINE CLINIC | Age: 80
End: 2020-02-21
Payer: MEDICARE

## 2020-02-21 VITALS
RESPIRATION RATE: 16 BRPM | HEART RATE: 92 BPM | WEIGHT: 221 LBS | SYSTOLIC BLOOD PRESSURE: 134 MMHG | BODY MASS INDEX: 37.93 KG/M2 | DIASTOLIC BLOOD PRESSURE: 86 MMHG

## 2020-02-21 PROCEDURE — G8427 DOCREV CUR MEDS BY ELIG CLIN: HCPCS | Performed by: FAMILY MEDICINE

## 2020-02-21 PROCEDURE — 1036F TOBACCO NON-USER: CPT | Performed by: FAMILY MEDICINE

## 2020-02-21 PROCEDURE — 1090F PRES/ABSN URINE INCON ASSESS: CPT | Performed by: FAMILY MEDICINE

## 2020-02-21 PROCEDURE — G8399 PT W/DXA RESULTS DOCUMENT: HCPCS | Performed by: FAMILY MEDICINE

## 2020-02-21 PROCEDURE — G8417 CALC BMI ABV UP PARAM F/U: HCPCS | Performed by: FAMILY MEDICINE

## 2020-02-21 PROCEDURE — G8484 FLU IMMUNIZE NO ADMIN: HCPCS | Performed by: FAMILY MEDICINE

## 2020-02-21 PROCEDURE — 99214 OFFICE O/P EST MOD 30 MIN: CPT | Performed by: FAMILY MEDICINE

## 2020-02-21 PROCEDURE — 1123F ACP DISCUSS/DSCN MKR DOCD: CPT | Performed by: FAMILY MEDICINE

## 2020-02-21 PROCEDURE — 4040F PNEUMOC VAC/ADMIN/RCVD: CPT | Performed by: FAMILY MEDICINE

## 2020-02-21 RX ORDER — GUAIFENESIN AND CODEINE PHOSPHATE 100; 10 MG/5ML; MG/5ML
5-10 SOLUTION ORAL 4 TIMES DAILY PRN
Qty: 120 ML | Refills: 0 | Status: SHIPPED | OUTPATIENT
Start: 2020-02-21 | End: 2020-02-28

## 2020-02-21 RX ORDER — CIPROFLOXACIN 500 MG/1
500 TABLET, FILM COATED ORAL 2 TIMES DAILY
Qty: 20 TABLET | Refills: 0 | Status: SHIPPED | OUTPATIENT
Start: 2020-02-21 | End: 2020-03-02

## 2020-02-21 ASSESSMENT — PATIENT HEALTH QUESTIONNAIRE - PHQ9
SUM OF ALL RESPONSES TO PHQ QUESTIONS 1-9: 0
1. LITTLE INTEREST OR PLEASURE IN DOING THINGS: 0
SUM OF ALL RESPONSES TO PHQ QUESTIONS 1-9: 0
2. FEELING DOWN, DEPRESSED OR HOPELESS: 0
SUM OF ALL RESPONSES TO PHQ9 QUESTIONS 1 & 2: 0

## 2020-02-21 ASSESSMENT — ENCOUNTER SYMPTOMS
VOMITING: 0
SHORTNESS OF BREATH: 0
COUGH: 1
WHEEZING: 0
HEMOPTYSIS: 0
CHEST TIGHTNESS: 0
ABDOMINAL PAIN: 0
CONSTIPATION: 0
DIARRHEA: 0
HEARTBURN: 0
BACK PAIN: 0
NAUSEA: 0
BLOOD IN STOOL: 0
RHINORRHEA: 1
SORE THROAT: 0
EYE PAIN: 0

## 2020-02-21 NOTE — PROGRESS NOTES
Subjective:      Patient ID: Leroy High is a 78 y.o. female. Cough   The current episode started in the past 7 days (x 3 to 4 days). The problem has been gradually worsening. The cough is productive of sputum. Associated symptoms include chills, postnasal drip and rhinorrhea. Pertinent negatives include no chest pain, ear congestion, ear pain, fever, headaches, heartburn, hemoptysis, myalgias, nasal congestion, rash, sore throat, shortness of breath, sweats, weight loss or wheezing. She has tried nothing for the symptoms. Review of Systems   Constitutional: Positive for chills. Negative for fatigue, fever, unexpected weight change and weight loss. HENT: Positive for postnasal drip and rhinorrhea. Negative for congestion, ear pain and sore throat. Eyes: Negative for pain and visual disturbance. Respiratory: Positive for cough. Negative for hemoptysis, chest tightness, shortness of breath and wheezing. Cardiovascular: Negative for chest pain and palpitations. Gastrointestinal: Negative for abdominal pain, blood in stool, constipation, diarrhea, heartburn, nausea and vomiting. Genitourinary: Negative for difficulty urinating, frequency, hematuria and urgency. Musculoskeletal: Negative for back pain, joint swelling, myalgias and neck pain. Skin: Negative for rash. Neurological: Negative for dizziness and headaches. Hematological: Negative for adenopathy. Does not bruise/bleed easily. Psychiatric/Behavioral: Negative for behavioral problems and sleep disturbance. The patient is not nervous/anxious. Objective:   Physical Exam  Vitals signs and nursing note reviewed. Constitutional:       Appearance: She is well-developed. HENT:      Head: Normocephalic and atraumatic. Right Ear: External ear normal.      Left Ear: External ear normal. Decreased hearing noted.       Ears:      Comments: Left cochelar implant     Nose: Nose normal.      Mouth/Throat:      Mouth: Mucous

## 2020-02-21 NOTE — PATIENT INSTRUCTIONS
These can cause stomach upset. Talk to your doctor if you take daily aspirin for another health problem. When should you call for help? Call 911 anytime you think you may need emergency care. For example, call if:    · You passed out (lost consciousness).     · You pass maroon or very bloody stools.     · You vomit blood or what looks like coffee grounds.     · You have new, severe belly pain.    Call your doctor now or seek immediate medical care if:    · Your pain gets worse, especially if it becomes focused in one area of your belly.     · You have a new or higher fever.     · Your stools are black and look like tar, or they have streaks of blood.     · You have unexpected vaginal bleeding.     · You have symptoms of a urinary tract infection. These may include:  ? Pain when you urinate. ? Urinating more often than usual.  ? Blood in your urine.     · You are dizzy or lightheaded, or you feel like you may faint.    Watch closely for changes in your health, and be sure to contact your doctor if:    · You are not getting better after 1 day (24 hours). Where can you learn more? Go to https://Tapdaq.[employer] World. org and sign in to your [employer] account. Enter U087 in the [employer] box to learn more about \"Abdominal Pain: Care Instructions. \"     If you do not have an account, please click on the \"Sign Up Now\" link. Current as of: June 26, 2019  Content Version: 12.3  © 1822-1464 [employer]. Care instructions adapted under license by [facility] ([facility]). If you have questions about a medical condition or this instruction, always ask your healthcare professional. [place] [address] any warranty or liability for your use of this information. Patient Education        Bronchitis: Care Instructions  Your Care Instructions    Bronchitis is inflammation of the bronchial tubes, which carry air to the lungs. The tubes swell and produce mucus, or phlegm.  The mucus and inflamed bronchial tubes make you cough. You may have trouble breathing. Most cases of bronchitis are caused by viruses like those that cause colds. Antibiotics usually do not help and they may be harmful. Bronchitis usually develops rapidly and lasts about 2 to 3 weeks in otherwise healthy people. Follow-up care is a key part of your treatment and safety. Be sure to make and go to all appointments, and call your doctor if you are having problems. It's also a good idea to know your test results and keep a list of the medicines you take. How can you care for yourself at home? · Take all medicines exactly as prescribed. Call your doctor if you think you are having a problem with your medicine. · Get some extra rest.  · Take an over-the-counter pain medicine, such as acetaminophen (Tylenol), ibuprofen (Advil, Motrin), or naproxen (Aleve) to reduce fever and relieve body aches. Read and follow all instructions on the label. · Do not take two or more pain medicines at the same time unless the doctor told you to. Many pain medicines have acetaminophen, which is Tylenol. Too much acetaminophen (Tylenol) can be harmful. · Take an over-the-counter cough medicine that contains dextromethorphan to help quiet a dry, hacking cough so that you can sleep. Avoid cough medicines that have more than one active ingredient. Read and follow all instructions on the label. · Breathe moist air from a humidifier, hot shower, or sink filled with hot water. The heat and moisture will thin mucus so you can cough it out. · Do not smoke. Smoking can make bronchitis worse. If you need help quitting, talk to your doctor about stop-smoking programs and medicines. These can increase your chances of quitting for good. When should you call for help? Call 911 anytime you think you may need emergency care.  For example, call if:    · You have severe trouble breathing.    Call your doctor now or seek immediate medical care if:    · You have Do not stop or change a medicine without talking to your doctor first.  When should you call for help? Call 911 anytime you think you may need emergency care. For example, call if:    · You passed out (lost consciousness), or you suddenly become very sleepy or confused. (You may have very low blood sugar.)    Call your doctor now or seek immediate medical care if:    · Your blood sugar is 300 mg/dL or is higher than the level your doctor has set for you.     · You have symptoms of low blood sugar, such as:  ? Sweating. ? Feeling nervous, shaky, and weak. ? Extreme hunger and slight nausea. ? Dizziness and headache.  ? Blurred vision. ? Confusion.    Watch closely for changes in your health, and be sure to contact your doctor if:    · You often have problems controlling your blood sugar.     · You have symptoms of long-term diabetes problems, such as:  ? New vision changes. ? New pain, numbness, or tingling in your hands or feet. ? Skin problems. Where can you learn more? Go to https://MaxxAthlete.[employer]. org and sign in to your [employer] account. Enter C553 in the [employer] box to learn more about \"Type 2 Diabetes: Care Instructions. \"     If you do not have an account, please click on the \"Sign Up Now\" link. Current as of: April 16, 2019  Content Version: 12.3  © 6569-5591 [employer]wise[employer]. Care instructions adapted under license by [facility] ([facility]). If you have questions about a medical condition or this instruction, always ask your healthcare professional. [place] [address] any warranty or liability for your use of this information.

## 2020-03-17 ENCOUNTER — TELEPHONE (OUTPATIENT)
Dept: FAMILY MEDICINE CLINIC | Age: 80
End: 2020-03-17

## 2020-03-17 RX ORDER — BENZONATATE 200 MG/1
200 CAPSULE ORAL 3 TIMES DAILY PRN
Qty: 30 CAPSULE | Refills: 0 | Status: SHIPPED | OUTPATIENT
Start: 2020-03-17

## 2020-06-30 RX ORDER — LANCETS 30 GAUGE
1 EACH MISCELLANEOUS DAILY
Qty: 100 EACH | Refills: 3 | Status: SHIPPED | OUTPATIENT
Start: 2020-06-30

## 2020-06-30 NOTE — TELEPHONE ENCOUNTER
Patient called to request a refill of her lancets and test strips. She uses a one tough ultra II. They told her at the pharmacy that it would be cheaper if she had a prescription from us. She uses [facility].   Call her back at [phone] if any problems Preceptor Attestation:  Patient's case reviewed and discussed with Aiden Hennessy MD. Patient seen and discussed with the resident. I agree with assessment and plan of care.  Supervising Physician:  Paige Steel DO  [facility]

## 2020-10-24 ENCOUNTER — APPOINTMENT (OUTPATIENT)
Dept: CT IMAGING | Age: 80
End: 2020-10-24
Payer: MEDICARE

## 2020-10-24 ENCOUNTER — HOSPITAL ENCOUNTER (EMERGENCY)
Age: 80
Discharge: HOME | End: 2020-10-24
Attending: EMERGENCY MEDICINE
Payer: MEDICARE

## 2020-10-24 ENCOUNTER — APPOINTMENT (OUTPATIENT)
Dept: GENERAL RADIOLOGY | Age: 80
End: 2020-10-24
Payer: MEDICARE

## 2020-10-24 VITALS
WEIGHT: 220 LBS | HEART RATE: 74 BPM | SYSTOLIC BLOOD PRESSURE: 165 MMHG | HEIGHT: 64 IN | RESPIRATION RATE: 19 BRPM | TEMPERATURE: 98.4 F | BODY MASS INDEX: 37.56 KG/M2 | OXYGEN SATURATION: 99 % | DIASTOLIC BLOOD PRESSURE: 82 MMHG

## 2020-10-24 LAB
ANION GAP SERPL CALCULATED.3IONS-SCNC: 11 MEQ/L (ref 8–16)
APTT: 27.1 SECONDS (ref 22–38)
BASOPHILS # BLD: 0.7 %
BASOPHILS ABSOLUTE: 0 THOU/MM3 (ref 0–0.1)
BUN BLDV-MCNC: 12 MG/DL (ref 7–22)
CALCIUM SERPL-MCNC: 9.5 MG/DL (ref 8.5–10.5)
CHLORIDE BLD-SCNC: 100 MEQ/L (ref 98–111)
CO2: 26 MEQ/L (ref 23–33)
CREAT SERPL-MCNC: 0.7 MG/DL (ref 0.4–1.2)
EKG ATRIAL RATE: 91 BPM
EKG P AXIS: 41 DEGREES
EKG P-R INTERVAL: 192 MS
EKG Q-T INTERVAL: 414 MS
EKG QRS DURATION: 146 MS
EKG QTC CALCULATION (BAZETT): 509 MS
EKG R AXIS: -58 DEGREES
EKG T AXIS: 3 DEGREES
EKG VENTRICULAR RATE: 91 BPM
EOSINOPHIL # BLD: 2.3 %
EOSINOPHILS ABSOLUTE: 0.2 THOU/MM3 (ref 0–0.4)
ERYTHROCYTE [DISTWIDTH] IN BLOOD BY AUTOMATED COUNT: 13.7 % (ref 11.5–14.5)
ERYTHROCYTE [DISTWIDTH] IN BLOOD BY AUTOMATED COUNT: 44.1 FL (ref 35–45)
GFR SERPL CREATININE-BSD FRML MDRD: 81 ML/MIN/1.73M2
GLUCOSE BLD-MCNC: 200 MG/DL (ref 70–108)
HCT VFR BLD CALC: 41 % (ref 37–47)
HEMOGLOBIN: 13 GM/DL (ref 12–16)
IMMATURE GRANS (ABS): 0.02 THOU/MM3 (ref 0–0.07)
IMMATURE GRANULOCYTES: 0.3 %
INR BLD: 0.95 (ref 0.85–1.13)
LYMPHOCYTES # BLD: 28.4 %
LYMPHOCYTES ABSOLUTE: 1.9 THOU/MM3 (ref 1–4.8)
MCH RBC QN AUTO: 28.1 PG (ref 26–33)
MCHC RBC AUTO-ENTMCNC: 31.7 GM/DL (ref 32.2–35.5)
MCV RBC AUTO: 88.7 FL (ref 81–99)
MONOCYTES # BLD: 9.7 %
MONOCYTES ABSOLUTE: 0.7 THOU/MM3 (ref 0.4–1.3)
NUCLEATED RED BLOOD CELLS: 0 /100 WBC
OSMOLALITY CALCULATION: 279.2 MOSMOL/KG (ref 275–300)
PLATELET # BLD: 207 THOU/MM3 (ref 130–400)
PMV BLD AUTO: 10.1 FL (ref 9.4–12.4)
POTASSIUM SERPL-SCNC: 3.7 MEQ/L (ref 3.5–5.2)
RBC # BLD: 4.62 MILL/MM3 (ref 4.2–5.4)
SEG NEUTROPHILS: 58.6 %
SEGMENTED NEUTROPHILS ABSOLUTE COUNT: 4 THOU/MM3 (ref 1.8–7.7)
SODIUM BLD-SCNC: 137 MEQ/L (ref 135–145)
WBC # BLD: 6.8 THOU/MM3 (ref 4.8–10.8)

## 2020-10-24 PROCEDURE — 70450 CT HEAD/BRAIN W/O DYE: CPT

## 2020-10-24 PROCEDURE — 85025 COMPLETE CBC W/AUTO DIFF WBC: CPT

## 2020-10-24 PROCEDURE — 72125 CT NECK SPINE W/O DYE: CPT

## 2020-10-24 PROCEDURE — 85610 PROTHROMBIN TIME: CPT

## 2020-10-24 PROCEDURE — 99284 EMERGENCY DEPT VISIT MOD MDM: CPT

## 2020-10-24 PROCEDURE — 71101 X-RAY EXAM UNILAT RIBS/CHEST: CPT

## 2020-10-24 PROCEDURE — 73130 X-RAY EXAM OF HAND: CPT

## 2020-10-24 PROCEDURE — 80048 BASIC METABOLIC PNL TOTAL CA: CPT

## 2020-10-24 PROCEDURE — 85730 THROMBOPLASTIN TIME PARTIAL: CPT

## 2020-10-24 PROCEDURE — 93005 ELECTROCARDIOGRAM TRACING: CPT | Performed by: EMERGENCY MEDICINE

## 2020-10-24 PROCEDURE — 36415 COLL VENOUS BLD VENIPUNCTURE: CPT

## 2020-10-24 ASSESSMENT — PAIN DESCRIPTION - LOCATION: LOCATION: HEAD;CHEST

## 2020-10-24 ASSESSMENT — PAIN DESCRIPTION - ORIENTATION: ORIENTATION: MID

## 2020-10-24 ASSESSMENT — PAIN SCALES - GENERAL: PAINLEVEL_OUTOF10: 5

## 2020-10-24 ASSESSMENT — PAIN DESCRIPTION - DESCRIPTORS: DESCRIPTORS: ACHING

## 2020-10-24 ASSESSMENT — PAIN DESCRIPTION - PAIN TYPE: TYPE: ACUTE PAIN

## 2020-10-24 NOTE — ED NOTES
Pt resting in bed, respirations unlabored. Call light within reach. Will continue to monitor.      Pascual Washington RN  10/24/20 4419

## 2020-10-24 NOTE — ED PROVIDER NOTES
[address] COMPLAINT       Chief Complaint   Patient presents with    Fall       Nurses Notes reviewed and I agree except as noted in the HPI. HISTORY OF PRESENT ILLNESS    Jeevan Marquez is a 78 y.o. female. This patient had a mechanical trip fall injury while trying to walk into a store. She states that she missed her step walking into a curb by [place]. She hit the left side of her head and periorbital area but was mainly complaining of pain at the base of the fifth finger of the left hand. Also hit the ribs on the left side. She denies Coumadin or anticoagulants. REVIEW OF SYSTEMS         No shortness of breath, no fever, no coughing, no abdominal pain no lower extremity pain    Remainder of review of systems is otherwise reviewed as negative. 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 SURGICAL HISTORY      has a past surgical history that includes Gallbladder surgery; Hysterectomy; Breast lumpectomy (2005); Varicose vein surgery (Bilateral, 01/2017); Cochlear implant (Left); and pr total knee arthroplasty (Right, 5/1/2018).     CURRENT MEDICATIONS       Previous Medications    AMLODIPINE (NORVASC) 10 MG TABLET    Take 1 tablet by mouth nightly    ASPIRIN 81 MG EC TABLET    Take 1 tablet by mouth daily Restart after lovenox done    BENZONATATE (TESSALON) 200 MG CAPSULE    Take 1 capsule by mouth 3 times daily as needed for Cough    BLOOD GLUCOSE MONITORING SUPPL KIT    Check BS daily, Dx E11.9, Uses one touch ultra machine    BLOOD GLUCOSE TEST STRIPS ([address] ULTRA TEST VI) STRIP    1 each by In Vitro route daily E11.9, check BS daily, uses One Touch Ultra II strips    LANCETS MISC    1 each by Does not apply route daily Dx: E11.9, uses deliza lancets, check bs daily. METFORMIN (GLUCOPHAGE) 500 MG TABLET    Take two tablets twice daily. METOPROLOL SUCCINATE (TOPROL XL) 25 MG EXTENDED RELEASE TABLET    Take 1 tablet by mouth daily    POTASSIUM CHLORIDE (KLOR-CON) 10 MEQ EXTENDED RELEASE TABLET    Take 1 tablet by mouth daily    VALSARTAN-HYDROCHLOROTHIAZIDE (DIOVAN HCT) 160-12.5 MG PER TABLET    Take 1 tablet by mouth daily       ALLERGIES     is allergic to adhesive tape. FAMILY HISTORY     She indicated that her mother is . She indicated that her father is . family history includes Heart Disease in her mother; High Blood Pressure in her father and mother. SOCIAL HISTORY      reports that she has never smoked. She has never used smokeless tobacco. She reports that she does not drink alcohol or use drugs. PHYSICAL EXAM     INITIAL VITALS:  height is 5' 4\" (1.626 m) and weight is 220 lb (99.8 kg). Her oral temperature is 98.4 °F (36.9 °C). Her blood pressure is 165/82 (abnormal) and her pulse is 74. Her respiration is 19 and oxygen saturation is 99%. Constitutional:  non-toxic   Eyes:  Pupils are equal and reactive, extraocular muscles intact   HENT: Has an external hematoma around the left periorbital area. Some bruising on the left cheek. No intraoral bleeding. No epistaxis. Neck- normal range of motion, nonspecific tenderness, supple   Respiratory:  No wheezing, rhonchi or rales  Cardiovascular: regular, no murmur  GI:  Non tender, no rigidity, rebound or guarding  Musculoskeletal: No deformity of the lower extremities  The patient has considerable bruising at the base of the fourth and fifth fingers of the left hand. With limited range of motion of both. She has good range of motion of all of the other fingers.   2 out of 4 radial and ulnar pulses bilaterally. Back- no midline  tenderness  Integument: warm and dry, no rash   Neurologic:  Alert & oriented x 3, cranial nerves II through XII are grossly intact. Equal strength in the upper and lower extremities bilaterally. Psychiatric:  Speech and behavior appropriate          DIAGNOSTIC RESULTS     EKG: All EKG's are interpreted by the Emergency Department Physician who either signs or Co-signs this chart in the absence of a cardiologist.  EKG interpreted by me showing sinus rhythm at a rate of 91, QRS of 146, QTc of 509, axis of -58. Occasional PVC noted. Right bundle pattern. RADIOLOGY: non-plain film images(s) such as CT, Ultrasound and MRI are read by the radiologist.  CT of the head, cervical spine were interpreted by the radiologist as negative. Chest x-ray interpreted by the radiologist as negative including left ribs. Left hand interpreted by the radiologist.  Fracture fragment between the base of the fourth and fifth digits suspicious for fracture. There was a question about the first digit as well however clinically the patient does not have any symptoms there.     LABS:   Labs Reviewed   CBC WITH AUTO DIFFERENTIAL - Abnormal; Notable for the following components:       Result Value    MCHC 31.7 (*)     All other components within normal limits   BASIC METABOLIC PANEL - Abnormal; Notable for the following components:    Glucose 200 (*)     All other components within normal limits   GLOMERULAR FILTRATION RATE, ESTIMATED - Abnormal; Notable for the following components:    Est, Glom Filt Rate 81 (*)     All other components within normal limits   PROTIME-INR   APTT   ANION GAP   OSMOLALITY       EMERGENCY DEPARTMENT COURSE:   Vitals:    Vitals:    10/24/20 1624 10/24/20 1626 10/24/20 1738 10/24/20 1835   BP:  (!) 178/95 (!) 165/82    Pulse: 92  78 74   Resp: 18  17 19   Temp: 98.4 °F (36.9 °C)      TempSrc: Oral      SpO2: 99%  99% 99%   Weight: 220 lb (99.8 kg)

## 2020-10-25 PROCEDURE — 93010 ELECTROCARDIOGRAM REPORT: CPT | Performed by: NUCLEAR MEDICINE

## 2020-10-26 ENCOUNTER — TELEPHONE (OUTPATIENT)
Dept: FAMILY MEDICINE CLINIC | Age: 80
End: 2020-10-26

## 2022-01-06 LAB — SARS-COV-2: DETECTED

## 2022-01-14 ENCOUNTER — APPOINTMENT (OUTPATIENT)
Dept: CT IMAGING | Age: 82
DRG: 177 | End: 2022-01-14
Payer: MEDICARE

## 2022-01-14 ENCOUNTER — HOSPITAL ENCOUNTER (INPATIENT)
Age: 82
LOS: 2 days | Discharge: HOME | DRG: 177 | End: 2022-01-16
Attending: EMERGENCY MEDICINE | Admitting: HOSPITALIST
Payer: MEDICARE

## 2022-01-14 ENCOUNTER — APPOINTMENT (OUTPATIENT)
Dept: GENERAL RADIOLOGY | Age: 82
DRG: 177 | End: 2022-01-14
Payer: MEDICARE

## 2022-01-14 DIAGNOSIS — U07.1 COVID: Primary | ICD-10-CM

## 2022-01-14 PROBLEM — J96.01 ACUTE HYPOXEMIC RESPIRATORY FAILURE DUE TO COVID-19 (HCC): Status: ACTIVE | Noted: 2022-01-14

## 2022-01-14 LAB
ALBUMIN SERPL-MCNC: 3.3 G/DL (ref 3.5–5.1)
ALP BLD-CCNC: 73 U/L (ref 38–126)
ALT SERPL-CCNC: 24 U/L (ref 11–66)
ANION GAP SERPL CALCULATED.3IONS-SCNC: 13 MEQ/L (ref 8–16)
AST SERPL-CCNC: 39 U/L (ref 5–40)
BASOPHILS # BLD: 0.5 %
BASOPHILS ABSOLUTE: 0 THOU/MM3 (ref 0–0.1)
BILIRUB SERPL-MCNC: 0.4 MG/DL (ref 0.3–1.2)
BUN BLDV-MCNC: 16 MG/DL (ref 7–22)
C-REACTIVE PROTEIN: 3.7 MG/DL (ref 0–1)
CALCIUM SERPL-MCNC: 8.9 MG/DL (ref 8.5–10.5)
CHLORIDE BLD-SCNC: 93 MEQ/L (ref 98–111)
CO2: 27 MEQ/L (ref 23–33)
CREAT SERPL-MCNC: 0.7 MG/DL (ref 0.4–1.2)
D-DIMER QUANTITATIVE: 5783 NG/ML FEU (ref 0–500)
EKG ATRIAL RATE: 79 BPM
EKG P AXIS: 48 DEGREES
EKG P-R INTERVAL: 166 MS
EKG Q-T INTERVAL: 404 MS
EKG QRS DURATION: 116 MS
EKG QTC CALCULATION (BAZETT): 463 MS
EKG R AXIS: -46 DEGREES
EKG T AXIS: -21 DEGREES
EKG VENTRICULAR RATE: 79 BPM
EOSINOPHIL # BLD: 0 %
EOSINOPHILS ABSOLUTE: 0 THOU/MM3 (ref 0–0.4)
ERYTHROCYTE [DISTWIDTH] IN BLOOD BY AUTOMATED COUNT: 14.2 % (ref 11.5–14.5)
ERYTHROCYTE [DISTWIDTH] IN BLOOD BY AUTOMATED COUNT: 44.8 FL (ref 35–45)
FERRITIN: 503 NG/ML (ref 10–291)
GFR SERPL CREATININE-BSD FRML MDRD: 80 ML/MIN/1.73M2
GLUCOSE BLD-MCNC: 124 MG/DL (ref 70–108)
GLUCOSE BLD-MCNC: 208 MG/DL (ref 70–108)
GLUCOSE BLD-MCNC: 215 MG/DL (ref 70–108)
GLUCOSE BLD-MCNC: 244 MG/DL (ref 70–108)
HCT VFR BLD CALC: 42.2 % (ref 37–47)
HEMOGLOBIN: 14 GM/DL (ref 12–16)
IMMATURE GRANS (ABS): 0.42 THOU/MM3 (ref 0–0.07)
IMMATURE GRANULOCYTES: 4.4 %
LACTIC ACID: 1.2 MMOL/L (ref 0.5–2)
LD: 306 U/L (ref 100–190)
LYMPHOCYTES # BLD: 10.1 %
LYMPHOCYTES ABSOLUTE: 1 THOU/MM3 (ref 1–4.8)
MCH RBC QN AUTO: 28.6 PG (ref 26–33)
MCHC RBC AUTO-ENTMCNC: 33.2 GM/DL (ref 32.2–35.5)
MCV RBC AUTO: 86.1 FL (ref 81–99)
MONOCYTES # BLD: 12.4 %
MONOCYTES ABSOLUTE: 1.2 THOU/MM3 (ref 0.4–1.3)
NUCLEATED RED BLOOD CELLS: 0 /100 WBC
OSMOLALITY CALCULATION: 269 MOSMOL/KG (ref 275–300)
PLATELET # BLD: 270 THOU/MM3 (ref 130–400)
PLATELET ESTIMATE: ADEQUATE
PMV BLD AUTO: 9.5 FL (ref 9.4–12.4)
POTASSIUM REFLEX MAGNESIUM: 4 MEQ/L (ref 3.5–5.2)
PROCALCITONIN: 0.14 NG/ML (ref 0.01–0.09)
RBC # BLD: 4.9 MILL/MM3 (ref 4.2–5.4)
SCAN OF BLOOD SMEAR: NORMAL
SEG NEUTROPHILS: 72.6 %
SEGMENTED NEUTROPHILS ABSOLUTE COUNT: 6.9 THOU/MM3 (ref 1.8–7.7)
SODIUM BLD-SCNC: 133 MEQ/L (ref 135–145)
TOTAL PROTEIN: 6.8 G/DL (ref 6.1–8)
TOXIC GRANULATION: PRESENT
TROPONIN T: < 0.01 NG/ML
WBC # BLD: 9.5 THOU/MM3 (ref 4.8–10.8)

## 2022-01-14 PROCEDURE — 85025 COMPLETE CBC W/AUTO DIFF WBC: CPT

## 2022-01-14 PROCEDURE — 83615 LACTATE (LD) (LDH) ENZYME: CPT

## 2022-01-14 PROCEDURE — 6370000000 HC RX 637 (ALT 250 FOR IP): Performed by: EMERGENCY MEDICINE

## 2022-01-14 PROCEDURE — 6360000002 HC RX W HCPCS: Performed by: EMERGENCY MEDICINE

## 2022-01-14 PROCEDURE — 86140 C-REACTIVE PROTEIN: CPT

## 2022-01-14 PROCEDURE — 99223 1ST HOSP IP/OBS HIGH 75: CPT

## 2022-01-14 PROCEDURE — 84484 ASSAY OF TROPONIN QUANT: CPT

## 2022-01-14 PROCEDURE — 99283 EMERGENCY DEPT VISIT LOW MDM: CPT

## 2022-01-14 PROCEDURE — 6360000004 HC RX CONTRAST MEDICATION: Performed by: EMERGENCY MEDICINE

## 2022-01-14 PROCEDURE — 85379 FIBRIN DEGRADATION QUANT: CPT

## 2022-01-14 PROCEDURE — 6360000002 HC RX W HCPCS

## 2022-01-14 PROCEDURE — 6370000000 HC RX 637 (ALT 250 FOR IP)

## 2022-01-14 PROCEDURE — 82948 REAGENT STRIP/BLOOD GLUCOSE: CPT

## 2022-01-14 PROCEDURE — 71045 X-RAY EXAM CHEST 1 VIEW: CPT

## 2022-01-14 PROCEDURE — 96374 THER/PROPH/DIAG INJ IV PUSH: CPT

## 2022-01-14 PROCEDURE — XW0DXM6 INTRODUCTION OF BARICITINIB INTO MOUTH AND PHARYNX, EXTERNAL APPROACH, NEW TECHNOLOGY GROUP 6: ICD-10-PCS

## 2022-01-14 PROCEDURE — 71275 CT ANGIOGRAPHY CHEST: CPT

## 2022-01-14 PROCEDURE — 84145 PROCALCITONIN (PCT): CPT

## 2022-01-14 PROCEDURE — 2580000003 HC RX 258

## 2022-01-14 PROCEDURE — 1200000000 HC SEMI PRIVATE

## 2022-01-14 PROCEDURE — 80053 COMPREHEN METABOLIC PANEL: CPT

## 2022-01-14 PROCEDURE — 83605 ASSAY OF LACTIC ACID: CPT

## 2022-01-14 PROCEDURE — 93005 ELECTROCARDIOGRAM TRACING: CPT | Performed by: EMERGENCY MEDICINE

## 2022-01-14 PROCEDURE — 82728 ASSAY OF FERRITIN: CPT

## 2022-01-14 RX ORDER — SODIUM CHLORIDE 0.9 % (FLUSH) 0.9 %
10 SYRINGE (ML) INJECTION PRN
Status: DISCONTINUED | OUTPATIENT
Start: 2022-01-14 | End: 2022-01-16 | Stop reason: HOSPADM

## 2022-01-14 RX ORDER — SODIUM CHLORIDE 9 MG/ML
25 INJECTION, SOLUTION INTRAVENOUS PRN
Status: DISCONTINUED | OUTPATIENT
Start: 2022-01-14 | End: 2022-01-16 | Stop reason: HOSPADM

## 2022-01-14 RX ORDER — SODIUM CHLORIDE 0.9 % (FLUSH) 0.9 %
10 SYRINGE (ML) INJECTION EVERY 12 HOURS SCHEDULED
Status: DISCONTINUED | OUTPATIENT
Start: 2022-01-14 | End: 2022-01-16 | Stop reason: HOSPADM

## 2022-01-14 RX ORDER — ACETAMINOPHEN 325 MG/1
650 TABLET ORAL EVERY 6 HOURS PRN
Status: DISCONTINUED | OUTPATIENT
Start: 2022-01-14 | End: 2022-01-16 | Stop reason: HOSPADM

## 2022-01-14 RX ORDER — POLYETHYLENE GLYCOL 3350 17 G/17G
17 POWDER, FOR SOLUTION ORAL DAILY PRN
Status: DISCONTINUED | OUTPATIENT
Start: 2022-01-14 | End: 2022-01-16 | Stop reason: HOSPADM

## 2022-01-14 RX ORDER — VALSARTAN AND HYDROCHLOROTHIAZIDE 160; 12.5 MG/1; MG/1
1 TABLET, FILM COATED ORAL DAILY
Status: DISCONTINUED | OUTPATIENT
Start: 2022-01-14 | End: 2022-01-15

## 2022-01-14 RX ORDER — ONDANSETRON 4 MG/1
4 TABLET, ORALLY DISINTEGRATING ORAL EVERY 8 HOURS PRN
Status: DISCONTINUED | OUTPATIENT
Start: 2022-01-14 | End: 2022-01-16 | Stop reason: HOSPADM

## 2022-01-14 RX ORDER — ACETAMINOPHEN 325 MG/1
650 TABLET ORAL ONCE
Status: COMPLETED | OUTPATIENT
Start: 2022-01-14 | End: 2022-01-14

## 2022-01-14 RX ORDER — MAGNESIUM SULFATE IN WATER 40 MG/ML
2000 INJECTION, SOLUTION INTRAVENOUS PRN
Status: DISCONTINUED | OUTPATIENT
Start: 2022-01-14 | End: 2022-01-16 | Stop reason: HOSPADM

## 2022-01-14 RX ORDER — POTASSIUM CHLORIDE 20 MEQ/1
40 TABLET, EXTENDED RELEASE ORAL PRN
Status: DISCONTINUED | OUTPATIENT
Start: 2022-01-14 | End: 2022-01-16 | Stop reason: HOSPADM

## 2022-01-14 RX ORDER — DEXTROSE MONOHYDRATE 25 G/50ML
12.5 INJECTION, SOLUTION INTRAVENOUS PRN
Status: DISCONTINUED | OUTPATIENT
Start: 2022-01-14 | End: 2022-01-16 | Stop reason: HOSPADM

## 2022-01-14 RX ORDER — POTASSIUM CHLORIDE 7.45 MG/ML
10 INJECTION INTRAVENOUS PRN
Status: DISCONTINUED | OUTPATIENT
Start: 2022-01-14 | End: 2022-01-16 | Stop reason: HOSPADM

## 2022-01-14 RX ORDER — METOPROLOL SUCCINATE 25 MG/1
25 TABLET, EXTENDED RELEASE ORAL DAILY
Status: DISCONTINUED | OUTPATIENT
Start: 2022-01-14 | End: 2022-01-16 | Stop reason: HOSPADM

## 2022-01-14 RX ORDER — POTASSIUM CHLORIDE 750 MG/1
10 TABLET, FILM COATED, EXTENDED RELEASE ORAL DAILY
Status: DISCONTINUED | OUTPATIENT
Start: 2022-01-14 | End: 2022-01-16 | Stop reason: HOSPADM

## 2022-01-14 RX ORDER — VITAMIN B COMPLEX
2000 TABLET ORAL DAILY
Status: DISCONTINUED | OUTPATIENT
Start: 2022-01-14 | End: 2022-01-16 | Stop reason: HOSPADM

## 2022-01-14 RX ORDER — CITALOPRAM 10 MG/1
10 TABLET ORAL DAILY
COMMUNITY

## 2022-01-14 RX ORDER — ACETAMINOPHEN 650 MG/1
650 SUPPOSITORY RECTAL EVERY 6 HOURS PRN
Status: DISCONTINUED | OUTPATIENT
Start: 2022-01-14 | End: 2022-01-16 | Stop reason: HOSPADM

## 2022-01-14 RX ORDER — IPRATROPIUM BROMIDE AND ALBUTEROL SULFATE 2.5; .5 MG/3ML; MG/3ML
1 SOLUTION RESPIRATORY (INHALATION) ONCE
Status: COMPLETED | OUTPATIENT
Start: 2022-01-14 | End: 2022-01-14

## 2022-01-14 RX ORDER — NICOTINE POLACRILEX 4 MG
15 LOZENGE BUCCAL PRN
Status: DISCONTINUED | OUTPATIENT
Start: 2022-01-14 | End: 2022-01-16 | Stop reason: HOSPADM

## 2022-01-14 RX ORDER — GUAIFENESIN/DEXTROMETHORPHAN 100-10MG/5
5 SYRUP ORAL EVERY 4 HOURS PRN
Status: DISCONTINUED | OUTPATIENT
Start: 2022-01-14 | End: 2022-01-16 | Stop reason: HOSPADM

## 2022-01-14 RX ORDER — DEXTROSE MONOHYDRATE 50 MG/ML
100 INJECTION, SOLUTION INTRAVENOUS PRN
Status: DISCONTINUED | OUTPATIENT
Start: 2022-01-14 | End: 2022-01-16 | Stop reason: HOSPADM

## 2022-01-14 RX ORDER — AMLODIPINE BESYLATE 10 MG/1
10 TABLET ORAL NIGHTLY
Status: DISCONTINUED | OUTPATIENT
Start: 2022-01-14 | End: 2022-01-15

## 2022-01-14 RX ORDER — ASPIRIN 81 MG/1
81 TABLET ORAL NIGHTLY
Status: DISCONTINUED | OUTPATIENT
Start: 2022-01-14 | End: 2022-01-16 | Stop reason: HOSPADM

## 2022-01-14 RX ORDER — DEXAMETHASONE SODIUM PHOSPHATE 4 MG/ML
6 INJECTION, SOLUTION INTRA-ARTICULAR; INTRALESIONAL; INTRAMUSCULAR; INTRAVENOUS; SOFT TISSUE ONCE
Status: COMPLETED | OUTPATIENT
Start: 2022-01-14 | End: 2022-01-14

## 2022-01-14 RX ORDER — ONDANSETRON 2 MG/ML
4 INJECTION INTRAMUSCULAR; INTRAVENOUS EVERY 6 HOURS PRN
Status: DISCONTINUED | OUTPATIENT
Start: 2022-01-14 | End: 2022-01-16 | Stop reason: HOSPADM

## 2022-01-14 RX ADMIN — BARICITINIB 4 MG: 2 TABLET, FILM COATED ORAL at 15:54

## 2022-01-14 RX ADMIN — INSULIN LISPRO 2 UNITS: 100 INJECTION, SOLUTION INTRAVENOUS; SUBCUTANEOUS at 21:42

## 2022-01-14 RX ADMIN — ACETAMINOPHEN 650 MG: 325 TABLET ORAL at 05:29

## 2022-01-14 RX ADMIN — ASPIRIN 81 MG: 81 TABLET, COATED ORAL at 23:38

## 2022-01-14 RX ADMIN — DEXAMETHASONE 6 MG: 0.5 TABLET ORAL at 15:47

## 2022-01-14 RX ADMIN — ENOXAPARIN SODIUM 40 MG: 100 INJECTION SUBCUTANEOUS at 21:45

## 2022-01-14 RX ADMIN — DEXAMETHASONE SODIUM PHOSPHATE 6 MG: 4 INJECTION, SOLUTION INTRA-ARTICULAR; INTRALESIONAL; INTRAMUSCULAR; INTRAVENOUS; SOFT TISSUE at 05:54

## 2022-01-14 RX ADMIN — IPRATROPIUM BROMIDE AND ALBUTEROL SULFATE 1 AMPULE: .5; 3 SOLUTION RESPIRATORY (INHALATION) at 05:55

## 2022-01-14 RX ADMIN — IOPAMIDOL 80 ML: 755 INJECTION, SOLUTION INTRAVENOUS at 08:05

## 2022-01-14 RX ADMIN — Medication 2000 UNITS: at 15:47

## 2022-01-14 RX ADMIN — SODIUM CHLORIDE, PRESERVATIVE FREE 10 ML: 5 INJECTION INTRAVENOUS at 21:45

## 2022-01-14 RX ADMIN — METOPROLOL SUCCINATE 25 MG: 25 TABLET, EXTENDED RELEASE ORAL at 23:38

## 2022-01-14 ASSESSMENT — ENCOUNTER SYMPTOMS
COUGH: 1
ABDOMINAL PAIN: 1
SINUS PAIN: 0
STRIDOR: 0
CHEST TIGHTNESS: 0
VOMITING: 0
BACK PAIN: 0
VOICE CHANGE: 0
ABDOMINAL DISTENTION: 0
SINUS PRESSURE: 0
NAUSEA: 0
RHINORRHEA: 0
CONSTIPATION: 0
SORE THROAT: 0
SHORTNESS OF BREATH: 1
DIARRHEA: 0
WHEEZING: 0

## 2022-01-14 ASSESSMENT — PAIN SCALES - GENERAL
PAINLEVEL_OUTOF10: 0
PAINLEVEL_OUTOF10: 2
PAINLEVEL_OUTOF10: 0

## 2022-01-14 NOTE — ED PROVIDER NOTES
[address] COMPLAINT       Chief Complaint   Patient presents with    Shortness of Breath       Nurses Notes reviewed and I agree except as noted in the HPI. HISTORY OF PRESENT ILLNESS    Sergio Rey is a 80 y.o. female. Ill-appearing patient who was diagnosed with COVID earlier in the week at another hospital.  She has now bounced back to us. Worsening dyspnea. Pulse ox in the 80s. Wheezing. Has had cough with sputum. No lower extremity pain    REVIEW OF SYSTEMS         Has had fever, no abdominal pain or vomiting    Remainder of review of systems is otherwise reviewed as negative. 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 SURGICAL HISTORY      has a past surgical history that includes Gallbladder surgery; Hysterectomy; Breast lumpectomy (2005); Varicose vein surgery (Bilateral, 01/2017); Cochlear implant (Left); and pr total knee arthroplasty (Right, 5/1/2018).     CURRENT MEDICATIONS       Previous Medications    AMLODIPINE (NORVASC) 10 MG TABLET    Take 1 tablet by mouth nightly    ASPIRIN 81 MG EC TABLET    Take 1 tablet by mouth daily Restart after lovenox done    BENZONATATE (TESSALON) 200 MG CAPSULE    Take 1 capsule by mouth 3 times daily as needed for Cough    BLOOD GLUCOSE MONITORING SUPPL KIT    Check BS daily, Dx E11.9, Uses one touch ultra machine    BLOOD GLUCOSE TEST STRIPS (ASCENSIA AUTODISC VI;ONE TOUCH ULTRA TEST VI) STRIP    1 each by In Vitro route daily E11.9, check BS daily, uses One Touch Ultra II strips    LANCETS MISC    1 each by Does not apply route daily Dx: E11.9, uses deliza lancets, check bs daily. METFORMIN (GLUCOPHAGE) 500 MG TABLET    Take two tablets twice daily. METOPROLOL SUCCINATE (TOPROL XL) 25 MG EXTENDED RELEASE TABLET    Take 1 tablet by mouth daily    POTASSIUM CHLORIDE (KLOR-CON) 10 MEQ EXTENDED RELEASE TABLET    Take 1 tablet by mouth daily    VALSARTAN-HYDROCHLOROTHIAZIDE (DIOVAN HCT) 160-12.5 MG PER TABLET    Take 1 tablet by mouth daily       ALLERGIES     is allergic to adhesive tape. FAMILY HISTORY     She indicated that her mother is . She indicated that her father is . family history includes Heart Disease in her mother; High Blood Pressure in her father and mother. SOCIAL HISTORY      reports that she has never smoked. She has never used smokeless tobacco. She reports that she does not drink alcohol and does not use drugs. PHYSICAL EXAM     INITIAL VITALS:  height is 5' 6\" (1.676 m) and weight is 190 lb (86.2 kg). Her axillary temperature is 100.9 °F (38.3 °C). Her blood pressure is 145/73 (abnormal) and her pulse is 77. Her respiration is 20 and oxygen saturation is 97%. Constitutional: Ill-appearing  Eyes:  Pupils are equal and reactive  HENT:  Atraumatic appearing  oropharynx moist, no pharyngeal exudates. Neck- normal range of motion, supple   Respiratory:  +exp wheezing  Cardiovascular: regular  GI:  Non tender, no rigidity, rebound or guarding  Musculoskeletal:  2/4 distal pulses, no pitting edema  Integument: warm and dry  Neurologic:  Alert & oriented x 3  Psychiatric:  Speech and behavior appropriate        DIAGNOSTIC RESULTS     EKG: All EKG's are interpreted by the Emergency Department Physician who either signs or Co-signs this chart in the absence of a cardiologist.  EKG interpreted by me shows sinus rhythm at a rate of 79, QRS of 116, QTc of 463, axis of -46.     RADIOLOGY: non-plain film images(s) such as CT, Ultrasound and MRI are read by the radiologist.  Chest x-ray showing patchy infiltrates interpreted by the radiologist    LABS:   Labs Reviewed   COMPREHENSIVE METABOLIC PANEL W/ REFLEX TO MG FOR LOW K - Abnormal; Notable for the following components:       Result Value    Glucose 124 (*)     Sodium 133 (*)     Chloride 93 (*)     Albumin 3.3 (*)     All other components within normal limits   GLOMERULAR FILTRATION RATE, ESTIMATED - Abnormal; Notable for the following components:    Est, Glom Filt Rate 80 (*)     All other components within normal limits   OSMOLALITY - Abnormal; Notable for the following components:    Osmolality Calc 269.0 (*)     All other components within normal limits   CBC WITH AUTO DIFFERENTIAL   TROPONIN   ANION GAP   PROCALCITONIN   LACTIC ACID, PLASMA   C-REACTIVE PROTEIN   FERRITIN   LACTATE DEHYDROGENASE   D-DIMER, QUANTITATIVE       EMERGENCY DEPARTMENT COURSE:   Vitals:    Vitals:    01/14/22 0506 01/14/22 0551 01/14/22 0626   BP: (!) 163/97 (!) 145/73    Pulse: 83  77   Resp: 20     Temp: 100.9 °F (38.3 °C)     TempSrc: Axillary     SpO2: (!) 88% 93% 97%   Weight: 190 lb (86.2 kg)     Height: 5' 6\" (1.676 m)       Patient is noted to be hypoxemic and has been placed on supplemental oxygen. I have ordered breathing treatments and Decadron. I have discussed the case with the hospitalist at their request I have added some additional labs.   They will be down to evaluate for admission      CRITICAL CARE:   none        FINAL IMPRESSION    covid related pneumonia with hypoxemia          DISCHARGE MEDICATIONS:  New Prescriptions    No medications on file       (Please note that portions of this note were completed with a voice recognition program.  Efforts were made to edit the dictations but occasionally words are mis-transcribed.)    Jael Garcia, [address] [place] [address], DO  01/14/22 0056

## 2022-01-14 NOTE — ED NOTES
Pt back on cot, no distress noted. Respirations even and unlabored.  Call light in reach     Audery Goldberg, RN  01/14/22 6851

## 2022-01-14 NOTE — PROGRESS NOTES
Pt unable to verify home medication list at this time. Family will get list or bring bottles as soon as able. She did not take home meds today yet. Pt is very [place]. Son Marilin Rose is here and we discuss plan of care including medications, IS, acapella, lovenox.

## 2022-01-14 NOTE — ED NOTES
Called 8A to notify that pt would be up soon, talked to [facility]. Pt transported by elian dean.       Rosalina Burrell  01/14/22 1801

## 2022-01-14 NOTE — ED NOTES
Pt appears to be resting on cot with eyes closed. No distress noted. Respirations even and unlabored. Call light in reach. Family at bedside.       Kye Adam RN  01/14/22 7343

## 2022-01-14 NOTE — H&P
Hospitalist - History & Physical      Patient: Jyoti Mosqueda    Unit/Bed:10/010A  YOB: 1940  MRN: [id number]   Acct: [de-identified]   PCP: Raymundo Sequiera MD    Date of Service: Pt seen/examined on 01/14/22  and Admitted to Inpatient with expected LOS greater than two midnights due to medical therapy. Chief Complaint:  Shortness of breath x 1 day    Assessment and Plan:-  1. Acute hypoxic respiratory failure, due to COVID-19:    Tested positive 1/06/2022. Currently requiring 3.5L O2 NC with SpO2 97%. Admitted to [facility] 1/11-1/13 and received 2-3 days of dexamethasone and baricitnib. CXR today reveals patchy infiltrates. CTA reveals no PE, bilateral patchy infiltrates with a very small right pleural effusion. CRP 3.7, , Procal 0.14, elevated D-dimer noted. WBC 9.5. T Max 100.9F upon arrival .Continue Dexamethasone 6mg x 7 days to complete 10 course, Pharmacy to dose Baricitnib for 11 more days to complete 14 day course. Wean O2 appropriately. IS/AC. Recheck to CBC and Procal tomorrow morning and reassess for worsening symptoms and consider antibiotics at this time. Receck BMP in the AM.     2.  Acute encephalopathy, with underlying dementia:    Likely secondary to COVID-19 infection. Family reports worsening dementia since being sick. Continue to monitor. 3. T2DM:    Glucose 124 today (1/14). Start Medium-dose SSI and Carb-limited diet. Hypoglycemic protocol in place. POC glucose checks. 4.   Primary HTN:    Elevated. /75 mmHg today (1/14). EKG today (1/14) reveals NSR with Left anterior fascicular block, consistent with prior EKG. Continue Amlodipine, metoprolol, Valsartan-HCTZ. Continue to monitor BP. 5.    CAD:     Stable. Continue BB, ASA, continue ARB. Unclear for why patient not on statin.     History Of Present Illness:    Ethan is a 81 y/o  female non-smoker with a PMHx of dementia, T2DM, HTN who presents to [facility] ED today for the evaluation of shortness of breath after being discharged yesterday from [facility] in [facility] for treated for hypoxia due to COVID-19. Patient is a poor historian, but son at bedside provided detailed history. He states she tested positive last week (1/06/2022), and was admitted to [facility] 1/11/2022 and was discharged 1/13/2022 in the afternoon without oxygen. He states that when she got home from the hospital yesterday, she slept until about 3 AM when she woke up with shortness of breath and an SpO2 84%. The son states the discharge instructions where very unclear yesterday and is unsure what medications she received yesterday at home. He also notes that her dementia is worsened since being sick, and notes associated cough, decreased appetite. The patient states she has been very tired, and has occasional sharp abdominal pain that's worse with coughing. She also states she had one episode of urinary incontinence this morning- states \"I didn't make it to the pot in time. \"  The patient was noted to have a T max of 100.9F. Otherwise, the patient denies chest pain, back pain, change in bowel habits, pain or burning with urination.        Past Medical History:        Diagnosis Date    Arthritis     hands, knee    Breast cancer ([address]     Right Breast    CAD (coronary artery disease)     Cochlear implant in place 2009    Left    [place] (hard of hearing)     Severe    Hypertension     Persistent proteinuria associated with type 2 diabetes mellitus ([address]     Type II or unspecified type diabetes mellitus without mention of complication, not stated as uncontrolled     Varicose vein of leg 12/2016       Past Surgical History:        Procedure Laterality Date    BREAST LUMPECTOMY  2005    Right    COCHLEAR IMPLANT Left     [place]    GALLBLADDER SURGERY      HYSTERECTOMY      UT TOTAL KNEE ARTHROPLASTY Right 5/1/2018    RIGHT KNEE TOTAL ARTHROPLASTY, LEFT KNEE INJECTION performed by Dank Sinha MD at [facility]  VARICOSE VEIN SURGERY Bilateral 01/2017    [facility]       Home Medications:   No current facility-administered medications on file prior to encounter. Current Outpatient Medications on File Prior to Encounter   Medication Sig Dispense Refill    Lancets MISC 1 each by Does not apply route daily Dx: E11.9, uses deliza lancets, check bs daily. 100 each 3    blood glucose test strips (ASCENSIA AUTODISC VI;ONE TOUCH ULTRA TEST VI) strip 1 each by In Vitro route daily E11.9, check BS daily, uses One Touch Ultra II strips 100 each 3    benzonatate (TESSALON) 200 MG capsule Take 1 capsule by mouth 3 times daily as needed for Cough 30 capsule 0    Blood Glucose Monitoring Suppl KIT Check BS daily, Dx E11.9, Uses one touch ultra machine 1 kit 0    metFORMIN (GLUCOPHAGE) 500 MG tablet Take two tablets twice daily. 360 tablet 3    metoprolol succinate (TOPROL XL) 25 MG extended release tablet Take 1 tablet by mouth daily 90 tablet 3    potassium chloride (KLOR-CON) 10 MEQ extended release tablet Take 1 tablet by mouth daily 90 tablet 3    valsartan-hydrochlorothiazide (DIOVAN HCT) 160-12.5 MG per tablet Take 1 tablet by mouth daily 90 tablet 3    amLODIPine (NORVASC) 10 MG tablet Take 1 tablet by mouth nightly 90 tablet 3    aspirin 81 MG EC tablet Take 1 tablet by mouth daily Restart after lovenox done (Patient taking differently: Take 81 mg by mouth nightly Restart after lovenox done) 30 tablet 11       Allergies:    Adhesive tape    Social History:    reports that she has never smoked. She has never used smokeless tobacco. She reports that she does not drink alcohol and does not use drugs. Family History:       Problem Relation Age of Onset    Heart Disease Mother     High Blood Pressure Mother     High Blood Pressure Father        Diet:  No diet orders on file    Review of systems:   Pertinent positives as noted in the HPI. All other systems reviewed and negative.   Review of Systems   Constitutional: Positive for appetite change (decreased), fatigue and fever. Negative for chills. HENT: Negative for congestion, postnasal drip, rhinorrhea, sinus pressure, sinus pain, sneezing, sore throat and voice change. Respiratory: Positive for cough and shortness of breath. Negative for chest tightness, wheezing and stridor. Cardiovascular: Negative for chest pain and leg swelling. Gastrointestinal: Positive for abdominal pain (associated with cough). Negative for abdominal distention, constipation, diarrhea, nausea and vomiting. Genitourinary: Positive for urgency (pt reports 1 epidsode of urinary incontinence). Negative for difficulty urinating, dysuria, flank pain, frequency and hematuria. Musculoskeletal: Positive for arthralgias and myalgias. Negative for back pain and joint swelling. Neurological: Positive for weakness (generalized). Negative for dizziness, seizures, speech difficulty, light-headedness, numbness and headaches. Psychiatric/Behavioral: Positive for confusion (pt family reports increased confusion). Negative for agitation and decreased concentration. The patient is not nervous/anxious. PHYSICAL EXAM:  BP (!) 145/73   Pulse 77   Temp 100.9 °F (38.3 °C) (Axillary)   Resp 20   Ht 5' 6\" (1.676 m)   Wt 190 lb (86.2 kg)   LMP  (LMP Unknown)   SpO2 97%   BMI 30.67 kg/m²   General appearance: Fatigued. No apparent distress, appears stated age and cooperative. HEENT: Normal cephalic, atraumatic without obvious deformity. Pupils equal, round, and reactive to light. Extra ocular muscles intact. Conjunctivae/corneas clear. Neck: Supple, with full range of motion. No jugular venous distention. Trachea midline. Respiratory: NC in place. Normal respiratory effort. Rhonchi heard in bilateral lung fields. Cardiovascular: Regular rate and rhythm with normal S1/S2 without murmurs, rubs or gallops. Abdomen: Soft, non-tender, non-distended.   Musculoskeletal:  No clubbing, cyanosis or edema bilaterally. Skin: Skin color, texture, turgor normal.  No rashes or lesions. Neurologic:  Neurovascularly intact without any focal sensory/motor deficits. Cranial nerves: II-XII intact, grossly non-focal.  Psychiatric: Alert and oriented x 3. Occasionally talks to self. Capillary Refill: Brisk,< 3 seconds   Peripheral Pulses: +2 palpable, equal bilaterally     Labs:   Recent Labs     01/14/22  0525   WBC 9.5   HGB 14.0   HCT 42.2        Recent Labs     01/14/22  0525   *   K 4.0   CL 93*   CO2 27   BUN 16   CREATININE 0.7   CALCIUM 8.9     Recent Labs     01/14/22  0525   AST 39   ALT 24   BILITOT 0.4   ALKPHOS 73     No results for input(s): INR in the last 72 hours. No results for input(s): Ethelene Soulier in the last 72 hours. Urinalysis:    Lab Results   Component Value Date    NITRU Negative 06/28/2019    WBCUA 5-10 04/17/2018    BACTERIA MODERATE 04/17/2018    RBCUA 0-2 04/17/2018    BLOODU Small 06/28/2019    BLOODU NEGATIVE 04/30/2018    SPECGRAV 1.016 04/30/2018    GLUCOSEU Negative 06/28/2019       Radiology:   XR CHEST PORTABLE   Final Result   Impression:   1. Mild patchy infiltrates. This document has been electronically signed by: Mark Singh MD on    01/14/2022 06:06 AM      CTA CHEST [address]    (Results Pending)     XR CHEST PORTABLE    Result Date: 1/14/2022  1 view chest x-ray. Comparison: None Findings: An interstitial pattern is noted. Minimal patchy perihilar infiltrates are noted. The heart is upper limits normal for size, given the portable technique. There may be a small left pleural effusion. Surgical clips overlie the right axilla. Osseous structures are unremarkable. .     Impression: 1. Mild patchy infiltrates.  This document has been electronically signed by: Mark Singh MD on 01/14/2022 06:06 AM        EKG: NSR with left anterior fasicular block     Atrial Rate: 79   Ventricular Rate: 79   QTc: 463      Electronically signed by Kim Silveira AMY Garcia on 1/14/2022 at 7:41 AM

## 2022-01-14 NOTE — ED NOTES
Pt up to restroom at this time with steady gait and the help of this RN     Adams Hyatt, JEROME  01/14/22 [id number], RN  01/14/22 [id number]

## 2022-01-15 LAB
ANION GAP SERPL CALCULATED.3IONS-SCNC: 11 MEQ/L (ref 8–16)
BUN BLDV-MCNC: 14 MG/DL (ref 7–22)
CALCIUM SERPL-MCNC: 8.8 MG/DL (ref 8.5–10.5)
CHLORIDE BLD-SCNC: 95 MEQ/L (ref 98–111)
CO2: 28 MEQ/L (ref 23–33)
CREAT SERPL-MCNC: 0.6 MG/DL (ref 0.4–1.2)
EKG ATRIAL RATE: 57 BPM
EKG P AXIS: 26 DEGREES
EKG P-R INTERVAL: 166 MS
EKG Q-T INTERVAL: 484 MS
EKG QRS DURATION: 122 MS
EKG QTC CALCULATION (BAZETT): 471 MS
EKG R AXIS: -50 DEGREES
EKG T AXIS: -45 DEGREES
EKG VENTRICULAR RATE: 57 BPM
ERYTHROCYTE [DISTWIDTH] IN BLOOD BY AUTOMATED COUNT: 13.9 % (ref 11.5–14.5)
ERYTHROCYTE [DISTWIDTH] IN BLOOD BY AUTOMATED COUNT: 43.5 FL (ref 35–45)
GFR SERPL CREATININE-BSD FRML MDRD: > 90 ML/MIN/1.73M2
GLUCOSE BLD-MCNC: 136 MG/DL (ref 70–108)
GLUCOSE BLD-MCNC: 141 MG/DL (ref 70–108)
GLUCOSE BLD-MCNC: 165 MG/DL (ref 70–108)
GLUCOSE BLD-MCNC: 166 MG/DL (ref 70–108)
GLUCOSE BLD-MCNC: 269 MG/DL (ref 70–108)
HCT VFR BLD CALC: 39 % (ref 37–47)
HEMOGLOBIN: 12.8 GM/DL (ref 12–16)
LACTIC ACID: 1.1 MMOL/L (ref 0.5–2)
MCH RBC QN AUTO: 28.2 PG (ref 26–33)
MCHC RBC AUTO-ENTMCNC: 32.8 GM/DL (ref 32.2–35.5)
MCV RBC AUTO: 85.9 FL (ref 81–99)
PLATELET # BLD: 263 THOU/MM3 (ref 130–400)
PMV BLD AUTO: 9.1 FL (ref 9.4–12.4)
POTASSIUM REFLEX MAGNESIUM: 4.4 MEQ/L (ref 3.5–5.2)
RBC # BLD: 4.54 MILL/MM3 (ref 4.2–5.4)
SODIUM BLD-SCNC: 134 MEQ/L (ref 135–145)
TROPONIN T: < 0.01 NG/ML
TROPONIN T: < 0.01 NG/ML
WBC # BLD: 6.3 THOU/MM3 (ref 4.8–10.8)

## 2022-01-15 PROCEDURE — 99233 SBSQ HOSP IP/OBS HIGH 50: CPT | Performed by: PHYSICIAN ASSISTANT

## 2022-01-15 PROCEDURE — 6360000002 HC RX W HCPCS

## 2022-01-15 PROCEDURE — 83605 ASSAY OF LACTIC ACID: CPT

## 2022-01-15 PROCEDURE — 85027 COMPLETE CBC AUTOMATED: CPT

## 2022-01-15 PROCEDURE — 36415 COLL VENOUS BLD VENIPUNCTURE: CPT

## 2022-01-15 PROCEDURE — 80048 BASIC METABOLIC PNL TOTAL CA: CPT

## 2022-01-15 PROCEDURE — 6370000000 HC RX 637 (ALT 250 FOR IP)

## 2022-01-15 PROCEDURE — 82948 REAGENT STRIP/BLOOD GLUCOSE: CPT

## 2022-01-15 PROCEDURE — 1200000000 HC SEMI PRIVATE

## 2022-01-15 PROCEDURE — 93005 ELECTROCARDIOGRAM TRACING: CPT | Performed by: PHYSICIAN ASSISTANT

## 2022-01-15 PROCEDURE — 84484 ASSAY OF TROPONIN QUANT: CPT

## 2022-01-15 PROCEDURE — 2580000003 HC RX 258

## 2022-01-15 RX ADMIN — INSULIN LISPRO 6 UNITS: 100 INJECTION, SOLUTION INTRAVENOUS; SUBCUTANEOUS at 12:40

## 2022-01-15 RX ADMIN — Medication 2000 UNITS: at 09:05

## 2022-01-15 RX ADMIN — INSULIN LISPRO 1 UNITS: 100 INJECTION, SOLUTION INTRAVENOUS; SUBCUTANEOUS at 20:14

## 2022-01-15 RX ADMIN — ASPIRIN 81 MG: 81 TABLET, COATED ORAL at 20:20

## 2022-01-15 RX ADMIN — BARICITINIB 4 MG: 2 TABLET, FILM COATED ORAL at 09:05

## 2022-01-15 RX ADMIN — SODIUM CHLORIDE, PRESERVATIVE FREE 10 ML: 5 INJECTION INTRAVENOUS at 09:06

## 2022-01-15 RX ADMIN — ACETAMINOPHEN 650 MG: 325 TABLET ORAL at 20:20

## 2022-01-15 RX ADMIN — SODIUM CHLORIDE, PRESERVATIVE FREE 10 ML: 5 INJECTION INTRAVENOUS at 20:20

## 2022-01-15 RX ADMIN — ENOXAPARIN SODIUM 40 MG: 100 INJECTION SUBCUTANEOUS at 20:19

## 2022-01-15 RX ADMIN — POTASSIUM CHLORIDE 10 MEQ: 750 TABLET, EXTENDED RELEASE ORAL at 10:39

## 2022-01-15 RX ADMIN — DEXAMETHASONE 6 MG: 0.5 TABLET ORAL at 09:05

## 2022-01-15 RX ADMIN — INSULIN LISPRO 2 UNITS: 100 INJECTION, SOLUTION INTRAVENOUS; SUBCUTANEOUS at 09:10

## 2022-01-15 ASSESSMENT — PAIN SCALES - GENERAL: PAINLEVEL_OUTOF10: 3

## 2022-01-15 NOTE — FLOWSHEET NOTE
The patient complained of chest pain and shortness of breath upon standing up. She also reported some left arm pain. Vilma TOVAR notified. EKG and troponin order.  Chest pain resolved

## 2022-01-15 NOTE — PROGRESS NOTES
Per pt son [facility], pt only takes 3 meds at home:  - metoprolol succinate ER 50mg PO daily  -citalopram 10mg PO daily  -metformin 500mg PO BID  Pt hasn't been taking potasium chloride ER, was d/c by MD

## 2022-01-15 NOTE — PROGRESS NOTES
Hospitalist Progress Note    Patient:  Mariano Aviles    Unit/Bed:8A-16/016-A  YOB: 1940  MRN: [id number]   Acct: [de-identified]   PCP: Audelia Jolley MD  Code Status: Full Code  Date of Admission: 1/14/2022    Expected Discharge: 1-2 days  Disposition: Home    Assessment/Plan:    1. Acute hypoxic resp failure d/t COVID-19 PNA: Tested positive 1/6/21. Pt was admitted to [facility] 1/11-1/13, received dexamethasone and baricitinib at that time. Was discharged home on RA, worsening SOB at home, now requiring O2. Resumed dexamethasone and baricitinib to complete course. Low suspicion for superimposed bacterial PNA at this time. ASA, Vit C/D/Zinc.   IS, Acapella. Wean O2 as tolerated. Currently on 4L NC with SpO2 91%. 2. Acute metabolic encephalopathy, with underlying dementia: Likely secondary to #1. Resolved. Pt is AOx3, appears at baseline. 3. NIDDMII: home metformin held. SSI, accu-checks. 4. Essential HTN: BP stable. Per the son, pt not taking amlodipine or valsartan-hctz at home, discontinued. Monitor BP.     5. Hx CAD: noted. BB, ASA resumed. Unclear why patient is not on statin, ace/arb.     6. Sinus bradycardia: Mild, HR 57 on EKG. Pt placed on tele, metoprolol was held this AM. Will monitor on tele overnight, parameters added to metoprolol. Chief Complaint: SOB    HPI / Hospital Course: HPI: Abdulaziz Chery is a 79 y/o  female non-smoker with a PMHx of dementia, T2DM, HTN who presents to [facility] ED today for the evaluation of shortness of breath after being discharged yesterday from [facility] in [place] for treated for hypoxia due to COVID-19. Patient is a poor historian, but son at bedside provided detailed history. He states she tested positive last week (1/06/2022), and was admitted to [facility] 1/11/2022 and was discharged 1/13/2022 in the afternoon without oxygen.  He states that when she got home from the hospital yesterday, she slept until about 3 AM when she woke up with shortness of breath and an SpO2 84%. The son states the discharge instructions where very unclear yesterday and is unsure what medications she received yesterday at home. He also notes that her dementia is worsened since being sick, and notes associated cough, decreased appetite. The patient states she has been very tired, and has occasional sharp abdominal pain that's worse with coughing. She also states she had one episode of urinary incontinence this morning- states \"I didn't make it to the pot in time. \"  The patient was noted to have a T max of 100.9F. Otherwise, the patient denies chest pain, back pain, change in bowel habits, pain or burning with urination. \"     Subjective (past 24 hours): Patient is up, ambulating in the room. AOx3. Per report, she had chest pain overnight. Patient states that she did not have chest pain, she only had left arm pain where her bruises are located. She is ambulating in the room when seen and denies chest pain, lightheadedness, sob. She denies fever/chills, abd pain, nvd, urinary sx. ROS: Pertinent positives as noted in HPI. All other systems reviewed and negative. Past medical history, family history, social history and allergies reviewed again and is unchanged since admission. Medications:  Reviewed.   Infusion Medications    dextrose      sodium chloride       Scheduled Medications    amLODIPine  10 mg Oral Nightly    aspirin  81 mg Oral Nightly    metoprolol succinate  25 mg Oral Daily    potassium chloride  10 mEq Oral Daily    valsartan-hydroCHLOROthiazide  1 tablet Oral Daily    insulin lispro  0-12 Units SubCUTAneous TID     insulin lispro  0-6 Units SubCUTAneous Nightly    sodium chloride flush  10 mL IntraVENous 2 times per day    dexamethasone  6 mg Oral Daily    Vitamin D  2,000 Units Oral Daily    enoxaparin  40 mg SubCUTAneous Q24H    baricitinib  4 mg Oral Daily     PRN Meds: glucose, dextrose, glucagon (rDNA), dextrose, sodium chloride flush, sodium chloride, ondansetron **OR** ondansetron, polyethylene glycol, acetaminophen **OR** acetaminophen, potassium chloride **OR** potassium alternative oral replacement **OR** potassium chloride, magnesium sulfate, guaiFENesin-dextromethorphan    I/O:     Intake/Output Summary (Last 24 hours) at 1/15/2022 1344  Last data filed at 1/15/2022 1036  Gross per 24 hour   Intake 360 ml   Output --   Net 360 ml       Diet:  ADULT DIET; Regular; 4 carb choices (60 gm/meal)    Exam:  /62   Pulse 71   Temp 98.3 °F (36.8 °C) (Oral)   Resp 18   Ht 5' 4\" (1.626 m)   Wt 200 lb 8 oz (90.9 kg)   LMP  (LMP Unknown)   SpO2 91%   BMI 34.42 kg/m²   General:   Pleasant, elderly female. NAD. HEENT:  normocephalic and atraumatic. No scleral icterus. PERR. Neck: supple. No JVD. No thyromegaly. Lungs: clear to auscultation. No retractions  Cardiac: RRR without murmur. Abdomen: soft. Nontender. Bowel sounds positive. Extremities:  No clubbing, cyanosis, or edema x 4. Vasculature: capillary refill < 3 seconds. Palpable LE pulses bilaterally. Skin:  warm and dry. Psych:  Alert and oriented x3. Affect appropriate  Lymph:  No supraclavicular adenopathy. Neurologic:  No focal deficit. No seizures. Data: (All radiographs, tracings, PFTs, and imaging are personally viewed and interpreted unless otherwise noted)  Labs:   Recent Labs     01/14/22  0525 01/15/22  0628   WBC 9.5 6.3   HGB 14.0 12.8   HCT 42.2 39.0    263     Recent Labs     01/14/22  0525 01/15/22  0628   * 134*   K 4.0 4.4   CL 93* 95*   CO2 27 28   BUN 16 14   CREATININE 0.7 0.6   CALCIUM 8.9 8.8     Recent Labs     01/14/22  0525   AST 39   ALT 24   BILITOT 0.4   ALKPHOS 73     No results for input(s): INR in the last 72 hours. No results for input(s): Kaylene Van Orin in the last 72 hours.   Urinalysis:   Lab Results   Component Value Date    NITRU Negative 06/28/2019    WBCUA 5-10 04/17/2018    BACTERIA MODERATE 04/17/2018    RBCUA 0-2 04/17/2018    BLOODU Small 06/28/2019    BLOODU NEGATIVE 04/30/2018    SPECGRAV 1.016 04/30/2018    GLUCOSEU Negative 06/28/2019     Urine culture:   Lab Results   Component Value Date    LABURIN [place] count: >100,000 CFU/mL 06/28/2019     Micro:   Blood culture #1: No results found for: BC  Blood culture #2:No results found for: BLOODCULT2  Organism:  Lab Results   Component Value Date    ORG Escherichia coli 06/28/2019       No results found for: LABGRAM  MRSA culture only:No results found for: [address]   Respiratory culture: No results found for: CULTRESP  Aerobic and Anaerobic :  No results found for: LABAERO  No results found for: [facility]    Radiology Reports:  CTA CHEST W WO CONTRAST   Final Result    There are no pulmonary emboli. There is possible pulmonary arterial hypertension. Multifocal infiltrates throughout both lungs with a very small right pleural effusion. **This report has been created using voice recognition software. It may contain minor errors which are inherent in voice recognition technology. **      Final report electronically signed by Dr. Natalie Flores on 1/14/2022 8:29 AM      XR CHEST PORTABLE   Final Result   Impression:   1. Mild patchy infiltrates. This document has been electronically signed by: Kiki Moore MD on    01/14/2022 06:06 AM        CTA CHEST W WO CONTRAST    Result Date: 1/14/2022  PROCEDURE: CTA CHEST W WO CONTRAST CLINICAL INFORMATION: elevated ddimer covid. COMPARISON: 2/17/2011 TECHNIQUE: 3 mm axial images were obtained through the chest after the administration of IV contrast.  A non-contrast localizer was obtained. 3D reconstructions were performed on the scanner to include MIP coronal and sagittal images through the chest. Isovue was the intravenous contrast utilized.  All CT scans at this facility use dose modulation, iterative reconstruction, and/or weight-based dosing when appropriate to reduce radiation dose to as low as reasonably achievable. FINDINGS: There is adequate opacification of the pulmonary arteries. There are no intraluminal filling defects to suggest pulmonary embolism. Appearance been very similar to the prior exam. The main pulmonary artery is enlarged measuring 3.8 cm which may indicate pulmonary arterial hypertension. This accounts for some underfilling of the distal most subsegmental branches of the pulmonary arteries. The aorta is within acceptable limits. The heart size is normal. There is a very small right pleural effusion. Very subtle left paratracheal adenopathy short axis measurement of 10 mm. Prominent right hilar lymph nodes. Scattered patchy infiltrates throughout both lungs. No suspicious osseous lesions are present. There are no suspicious findings in the imaged aspects of the upper abdomen. There are no pulmonary emboli. There is possible pulmonary arterial hypertension. Multifocal infiltrates throughout both lungs with a very small right pleural effusion. **This report has been created using voice recognition software. It may contain minor errors which are inherent in voice recognition technology. ** Final report electronically signed by Dr. Rosa Isela Green on 1/14/2022 8:29 AM    XR CHEST PORTABLE    Result Date: 1/14/2022  1 view chest x-ray. Comparison: None Findings: An interstitial pattern is noted. Minimal patchy perihilar infiltrates are noted. The heart is upper limits normal for size, given the portable technique. There may be a small left pleural effusion. Surgical clips overlie the right axilla. Osseous structures are unremarkable. .     Impression: 1. Mild patchy infiltrates.  This document has been electronically signed by: Flor Loyola MD on 01/14/2022 06:06 AM        Tele:   [x] yes             [] no      Active Hospital Problems    Diagnosis Date Noted    [facility] hypoxemic respiratory [facility] to [facility] [U07.1, J96.01] 01/14/2022 Electronically signed by Miguelito Pulliam PA-C on 1/15/2022 at 1:44 PM

## 2022-01-15 NOTE — PROGRESS NOTES
Physician Progress Note      PATIENTScarcaridad January  [facility] #:                  [id number]  :                       1940  ADMIT DATE:       2022 4:59 AM  DISCH DATE:  RESPONDING  PROVIDER #:        Shruthi Whatley PA-C        QUERY TEXT:    Type of Encephalopathy: Please provide further specificity, if known. Clinical indicators include: acute, encephalopathy, infection, hypoglycemic,   alcohol, fever  Options provided:  -- Anoxic/hypoxic encephalopathy  -- Metabolic encephalopathy  -- Toxic encephalopathy  -- Hepatic encephalopathy  -- Hypertensive encephalopathy  -- Other - I will add my own diagnosis  -- Disagree - Not applicable / Not valid  -- Disagree - Clinically Unable to determine / Unknown        PROVIDER RESPONSE TEXT:    The patient has metabolic encephalopathy. QUERY TEXT:    Attending,    Pt admitted with COVID-19 and noted to have acute hypoxic respiratory failure,   acute encephalopathy, T 100.9, WBC wnl, CRP 3.70, procal 0.14, lactic acid   wnl. If possible, please respond below and document in the progress notes and   discharge summary if you are evaluating and/or treating: The medical record reflects the following:  Risk Factors: COVID-19, DM 2, HTN, CAD, dementia, advanced age  Clinical Indicators: acute hypoxic respiratory failure, acute encephalopathy,   T 100.9, WBC wnl, CRP 3.70, procal 0.14, lactic acid wnl  Treatment: baricitinib, Tylenol, ASA, Decadron, Vit D, O2, labs, imaging    Thank you!     Belle Marcelo, RN, BSN, RHIT, CCDS  RN Clinical   [phone]  Options provided:  -- Sepsis present on admission due to COVID-19 infection  -- Sepsis not present on admission due to COVID-19 infection  -- Covid-19 infection without sepsis  -- Other - I will add my own diagnosis  -- Disagree - Not applicable / Not valid  -- Disagree - Clinically unable to determine / Unknown  -- Refer to Clinical Documentation Reviewer    PROVIDER

## 2022-01-15 NOTE — PROGRESS NOTES
9: 19am- AMY Hickman served with the following message:\"pt c/o chest pain earlier this morning, EKG was ordered by house MD. EKG abnormal- shows sinus bradycardia, left anterior fascicular block, left ventricular hypertrophy with QRS widening, T wave abnormality. Pt is not on TELE monitor ( do you want to order?), denies any chest pain/discomfort at this time. Also HR 57, B/P 163/82 do you want me to give metoprolol or hold it? Please let me know. Thanks! \"  9:20am- metoprolol held per PA's order, placed TELE monitor #23.  Will continue to monitor

## 2022-01-15 NOTE — PROGRESS NOTES
12:05 pm- son brought in copies of pt living will and health care power of  forms, they placed in the chart

## 2022-01-16 VITALS
BODY MASS INDEX: 34.23 KG/M2 | SYSTOLIC BLOOD PRESSURE: 139 MMHG | HEIGHT: 64 IN | DIASTOLIC BLOOD PRESSURE: 74 MMHG | TEMPERATURE: 97.6 F | OXYGEN SATURATION: 95 % | HEART RATE: 60 BPM | RESPIRATION RATE: 16 BRPM | WEIGHT: 200.5 LBS

## 2022-01-16 LAB
GLUCOSE BLD-MCNC: 125 MG/DL (ref 70–108)
GLUCOSE BLD-MCNC: 160 MG/DL (ref 70–108)
GLUCOSE BLD-MCNC: 95 MG/DL (ref 70–108)

## 2022-01-16 PROCEDURE — 94761 N-INVAS EAR/PLS OXIMETRY MLT: CPT

## 2022-01-16 PROCEDURE — 82948 REAGENT STRIP/BLOOD GLUCOSE: CPT

## 2022-01-16 PROCEDURE — 99239 HOSP IP/OBS DSCHRG MGMT >30: CPT | Performed by: PHYSICIAN ASSISTANT

## 2022-01-16 PROCEDURE — 2580000003 HC RX 258

## 2022-01-16 PROCEDURE — 6360000002 HC RX W HCPCS

## 2022-01-16 PROCEDURE — 6370000000 HC RX 637 (ALT 250 FOR IP)

## 2022-01-16 PROCEDURE — 2700000000 HC OXYGEN THERAPY PER DAY

## 2022-01-16 PROCEDURE — 6370000000 HC RX 637 (ALT 250 FOR IP): Performed by: PHYSICIAN ASSISTANT

## 2022-01-16 RX ORDER — DEXAMETHASONE 6 MG/1
6 TABLET ORAL DAILY
Qty: 4 TABLET | Refills: 0 | Status: SHIPPED | OUTPATIENT
Start: 2022-01-17 | End: 2022-01-21

## 2022-01-16 RX ADMIN — DEXAMETHASONE 6 MG: 0.5 TABLET ORAL at 09:33

## 2022-01-16 RX ADMIN — BARICITINIB 4 MG: 2 TABLET, FILM COATED ORAL at 09:34

## 2022-01-16 RX ADMIN — METOPROLOL SUCCINATE 25 MG: 25 TABLET, EXTENDED RELEASE ORAL at 09:33

## 2022-01-16 RX ADMIN — Medication 2000 UNITS: at 09:33

## 2022-01-16 RX ADMIN — SODIUM CHLORIDE, PRESERVATIVE FREE 10 ML: 5 INJECTION INTRAVENOUS at 09:30

## 2022-01-16 RX ADMIN — POTASSIUM CHLORIDE 10 MEQ: 750 TABLET, EXTENDED RELEASE ORAL at 09:34

## 2022-01-16 RX ADMIN — INSULIN LISPRO 2 UNITS: 100 INJECTION, SOLUTION INTRAVENOUS; SUBCUTANEOUS at 12:04

## 2022-01-16 ASSESSMENT — PAIN SCALES - GENERAL
PAINLEVEL_OUTOF10: 0

## 2022-01-16 NOTE — PROGRESS NOTES
A home oxygen evaluation has been completed. [x]Patient is an inpatient. It is expected that the patient will be discharged within the next 48 hours. Qualified provider to write order for home prescription if patient qualifies. Social service/care managers will arrange for home oxygen. If patient is active, arrange for Home Medical supplier to assess for Oxygen Conserving Device per pulse oximetry. []Patient is an outpatient. Results will be faxed to the ordering provider. Qualified provider to write order for home prescription if patient qualifies and arranges for home oxygen. Patient was placed on room air for 1 minutes. SpO2 was 87 % on room air at rest. Patients SpO2 was below 89% and qualified for home oxygen. Oxygen was applied at 1 lpm via nasal cannula to maintain a SpO2 between 90-92% while at rest. Actual SpO2 was 92 %. Patient did not want to ambulate for exercise flow rate. Patients stood up at bedside for a few minutes, SpO2 was 92% on 1 lpm to maintain SpO2 between 90-92%. She stated she was SOB and didn't want to walk in the rodriguez, although Sat  did not drop with exertion of getting up. Eval done by Lina Lawrence RCP.

## 2022-01-16 NOTE — PROGRESS NOTES
Discharge instructions given to patient and son gila. Verbalized understanding. Waiting for home O2 to be delivered to [address]. Stable and ambulatory.

## 2022-01-16 NOTE — PROGRESS NOTES
A home oxygen evaluation has been completed. [x]Patient is an inpatient. It is expected that the patient will be discharged within the next 48 hours. Qualified provider to write order for home prescription if patient qualifies. Social service/care managers will arrange for home oxygen. If patient is active, arrange for Home Medical supplier to assess for Oxygen Conserving Device per pulse oximetry. []Patient is an outpatient. Results will be faxed to the ordering provider. Qualified provider to write order for home prescription if patient qualifies and arranges for home oxygen. Patient was placed on room air for 15  minutes. SpO2 was 89 % on room air at rest. Patients SpO2 was below 89% and qualified for home oxygen. Oxygen was applied at 1 lpm via nasal cannula to maintain a SpO2 between 90-92% while at rest. Actual SpO2 was 94 %. Patient can ambulate for exercise flow rate. Patients was ambulated, SpO2 was 92% on 2 lpm to maintain SpO2 between 90-92% while exercising. If oxygen need is greater than 4 lpm the SpO2 on 4 lpm was . Note: For any SpO2 at 12% see policy and procedure for possible qualifications.

## 2022-01-16 NOTE — DISCHARGE SUMMARY
Hospitalist Discharge Summary    Patient: Wolf Newman  YOB: 1940  MRN: [id number]   Acct: [de-identified]    Primary Care Physician: Jina Duran MD    Admit date  1/14/2022    Discharge date:  1/16/2022    Discharge Assessment and Plan:    1. Acute hypoxic resp failure d/t COVID-19 PNA: Tested positive 1/6/21. Pt was admitted to [facility] 1/11-1/13, received dexamethasone and baricitinib at that time. Was discharged home on RA, had worsening SOB at home, now requiring O2. Resumed dexamethasone and baricitinib. Low suspicion for superimposed bacterial PNA at this time. ASA. May continue OTC Vit C/D/Zinc. IS, Acapella. Continue dexamethasone at discharge to complete 10 day course. Home O2 eval done. Patient was evaluated today for the diagnosis of Hypoxia d/t COVID-19 PNA. I entered a DME order for home oxygen because the diagnosis and testing requires the patient to have supplemental oxygen. Condition will improve or be benefited by oxygen use. The patient is  able to perform good mobility in a home setting and therefore does require the use of a portable oxygen system. The need for this equipment was discussed with the patient and she understands and is in agreement.       2. Acute metabolic encephalopathy, with underlying dementia: Likely secondary to #1. Resolved. Pt is AOx3, appears at baseline.      3. NIDDMII: resume metformin at OR, f/up with pcp.      4. Essential HTN: BP stable. Per the son, pt not taking amlodipine or valsartan-hctz at home, discontinued. Continue metoprolol. Follow up with PCP.     5. Hx CAD: noted. BB, ASA resumed. Unclear why patient is not on statin, stopped taking arb. Follow up with primary cardiologist.       6. Sinus bradycardia: Mild, HR 57 on EKG 1/14. Parameters added to metoprolol. Pt placed on tele. Pt received metoprolol today and HR has been stable. Continue at discharge. Follow up with PCP for continued monitoring.          Chief Complaint on presentation: SOB    Initial H&P / Hospital Course: HPI: Valente Hodgkins is a 79 y/o  female non-smoker with a PMHx of dementia, T2DM, HTN who presents to [facility] ED today for the evaluation of shortness of breath after being discharged yesterday from [facility] in [place] for treated for hypoxia due to COVID-19. Patient is a poor historian, but son at bedside provided detailed history. He states she tested positive last week (1/06/2022), and was admitted to [facility] 1/11/2022 and was discharged 1/13/2022 in the afternoon without oxygen. He states that when she got home from the hospital yesterday, she slept until about 3 AM when she woke up with shortness of breath and an SpO2 84%. The son states the discharge instructions where very unclear yesterday and is unsure what medications she received yesterday at home. He also notes that her dementia is worsened since being sick, and notes associated cough, decreased appetite. The patient states she has been very tired, and has occasional sharp abdominal pain that's worse with coughing. She also states she had one episode of urinary incontinence this morning- states \"I didn't make it to the pot in time. \"  The patient was noted to have a T max of 100.9F. Otherwise, the patient denies chest pain, back pain, change in bowel habits, pain or burning with urination. \"     1/15: Patient is up, ambulating in the room. AOx3. Per report, she had chest pain overnight. Patient states that she did not have chest pain, she only had left arm pain where her bruises are located. She is ambulating in the room when seen and denies chest pain, lightheadedness, sob. She denies fever/chills, abd pain, nvd, urinary sx. Subjective (day of discharge): Patient is doing well. She is ambulating in the room without difficultly. She denies fever/chills, sob, cp, abd pain, nvd. Patient states she feels safe for discharge today.  Alarmaing sign/symptoms explained to the patient. Patient responded well to medical management. The patient was discharged in stable condition with appropriate outpatient follow up arranged. Physical Exam:-  Vitals: Patient Vitals for the past 24 hrs:   BP Temp Temp src Pulse Resp SpO2   01/16/22 0930 (!) 153/78 97.6 °F (36.4 °C) Oral 67 18 --   01/16/22 0421 (!) 152/89 97.7 °F (36.5 °C) Oral 68 18 97 %   01/15/22 2329 133/68 97.7 °F (36.5 °C) Oral 61 18 93 %   01/15/22 1929 (!) 150/77 97.8 °F (36.6 °C) Oral 56 18 95 %   01/15/22 1622 (!) 118/56 98 °F (36.7 °C) Oral 57 16 94 %   01/15/22 1122 131/62 98.3 °F (36.8 °C) Oral 71 18 91 %     Weight: Weight: 200 lb 8 oz (90.9 kg)   24 hour intake/output:     Intake/Output Summary (Last 24 hours) at 1/16/2022 0941  Last data filed at 1/15/2022 1929  Gross per 24 hour   Intake 960 ml   Output 2 ml   Net 958 ml       General appearance: No apparent distress, appears stated age and cooperative. HEENT: Normal cephalic, atraumatic without obvious deformity. Pupils equal, round, and reactive to light. Extra ocular muscles intact. Conjunctivae/corneas clear. Neck: Supple, with full range of motion. No jugular venous distention. Trachea midline. Respiratory:  Normal respiratory effort. Clear to auscultation, bilaterally without Rales/Wheezes/Rhonchi. Cardiovascular: Regular rate and rhythm with normal S1/S2 without murmurs, rubs or gallops. Abdomen: Soft, non-tender, non-distended with normal bowel sounds. Musculoskeletal:  No clubbing, cyanosis or edema bilaterally. Skin: Skin color, texture, turgor normal.  No rashes or lesions. Neurologic:  Neurovascularly intact without any focal sensory/motor deficits.  Cranial nerves: II-XII intact, grossly non-focal.  Psychiatric: Alert and oriented, thought content appropriate, normal insight  Capillary Refill: Brisk,< 3 seconds   Peripheral Pulses: +2 palpable, equal bilaterally     Labs :  Recent Results (from the past 72 hour(s))   CBC Auto Differential Collection Time: 01/14/22  5:25 AM   Result Value Ref Range    WBC 9.5 4.8 - 10.8 thou/mm3    RBC 4.90 4.20 - 5.40 mill/mm3    Hemoglobin 14.0 12.0 - 16.0 gm/dl    Hematocrit 42.2 37.0 - 47.0 %    MCV 86.1 81.0 - 99.0 fL    MCH 28.6 26.0 - 33.0 pg    MCHC 33.2 32.2 - 35.5 gm/dl    RDW-CV 14.2 11.5 - 14.5 %    RDW-SD 44.8 35.0 - 45.0 fL    Platelets 892 829 - 170 thou/mm3    MPV 9.5 9.4 - 12.4 fL    Seg Neutrophils 72.6 %    Lymphocytes 10.1 %    Monocytes 12.4 %    Eosinophils 0.0 %    Basophils 0.5 %    Immature Granulocytes 4.4 %    Platelet Estimate ADEQUATE Adequate    Segs Absolute 6.9 1.8 - 7.7 thou/mm3    Lymphocytes Absolute 1.0 1.0 - 4.8 thou/mm3    Monocytes Absolute 1.2 0.4 - 1.3 thou/mm3    Eosinophils Absolute 0.0 0.0 - 0.4 thou/mm3    Basophils Absolute 0.0 0.0 - 0.1 thou/mm3    Immature Grans (Abs) 0.42 (H) 0.00 - 0.07 thou/mm3    nRBC 0 /100 wbc    Toxic Granulation Present Absent   Comprehensive Metabolic Panel w/ Reflex to MG    Collection Time: 01/14/22  5:25 AM   Result Value Ref Range    Glucose 124 (H) 70 - 108 mg/dL    CREATININE 0.7 0.4 - 1.2 mg/dL    BUN 16 7 - 22 mg/dL    Sodium 133 (L) 135 - 145 meq/L    Potassium reflex Magnesium 4.0 3.5 - 5.2 meq/L    Chloride 93 (L) 98 - 111 meq/L    CO2 27 23 - 33 meq/L    Calcium 8.9 8.5 - 10.5 mg/dL    AST 39 5 - 40 U/L    Alkaline Phosphatase 73 38 - 126 U/L    Total Protein 6.8 6.1 - 8.0 g/dL    Albumin 3.3 (L) 3.5 - 5.1 g/dL    Total Bilirubin 0.4 0.3 - 1.2 mg/dL    ALT 24 11 - 66 U/L   Troponin    Collection Time: 01/14/22  5:25 AM   Result Value Ref Range    Troponin T < 0.010 ng/ml   Anion Gap    Collection Time: 01/14/22  5:25 AM   Result Value Ref Range    Anion Gap 13.0 8.0 - 16.0 meq/L   Glomerular Filtration Rate, Estimated    Collection Time: 01/14/22  5:25 AM   Result Value Ref Range    Est, Glom Filt Rate 80 (A) ml/min/1.73m2   Osmolality    Collection Time: 01/14/22  5:25 AM   Result Value Ref Range    Osmolality Calc 269.0 (L) 275.0 - 300.0 mOsmol/kg   C-reactive protein    Collection Time: 01/14/22  5:25 AM   Result Value Ref Range    CRP 3.70 (H) 0.00 - 1.00 mg/dl   Ferritin    Collection Time: 01/14/22  5:25 AM   Result Value Ref Range    Ferritin 503 (H) 10 - 291 ng/mL   Lactate dehydrogenase    Collection Time: 01/14/22  5:25 AM   Result Value Ref Range     (H) 100 - 190 U/L   D-dimer, quantitative    Collection Time: 01/14/22  5:25 AM   Result Value Ref Range    D-Dimer, Quant 5783.00 (H) 0.00 - 500.00 ng/ml FEU   Procalcitonin    Collection Time: 01/14/22  5:25 AM   Result Value Ref Range    Procalcitonin 0.14 (H) 0.01 - 0.09 ng/mL   Scan of Blood Smear    Collection Time: 01/14/22  5:25 AM   Result Value Ref Range    SCAN OF BLOOD SMEAR see below    EKG 12 Lead    Collection Time: 01/14/22  5:30 AM   Result Value Ref Range    Ventricular Rate 79 BPM    Atrial Rate 79 BPM    P-R Interval 166 ms    QRS Duration 116 ms    Q-T Interval 404 ms    QTc Calculation (Bazett) 463 ms    P Axis 48 degrees    R Axis -46 degrees    T Axis -21 degrees   Lactic acid, plasma    Collection Time: 01/14/22  6:40 AM   Result Value Ref Range    Lactic Acid 1.2 0.5 - 2.0 mmol/L   POCT glucose    Collection Time: 01/14/22  1:48 PM   Result Value Ref Range    POC Glucose 208 (H) 70 - 108 mg/dl   POCT glucose    Collection Time: 01/14/22  5:27 PM   Result Value Ref Range    POC Glucose 244 (H) 70 - 108 mg/dl   POCT glucose    Collection Time: 01/14/22  7:22 PM   Result Value Ref Range    POC Glucose 215 (H) 70 - 108 mg/dl   CBC    Collection Time: 01/15/22  6:28 AM   Result Value Ref Range    WBC 6.3 4.8 - 10.8 thou/mm3    RBC 4.54 4.20 - 5.40 mill/mm3    Hemoglobin 12.8 12.0 - 16.0 gm/dl    Hematocrit 39.0 37.0 - 47.0 %    MCV 85.9 81.0 - 99.0 fL    MCH 28.2 26.0 - 33.0 pg    MCHC 32.8 32.2 - 35.5 gm/dl    RDW-CV 13.9 11.5 - 14.5 %    RDW-SD 43.5 35.0 - 45.0 fL    Platelets 602 281 - 144 thou/mm3    MPV 9.1 (L) 9.4 - 12.4 fL   Basic Metabolic Panel w/ Reflex to MG    Collection Time: 01/15/22  6:28 AM   Result Value Ref Range    Sodium 134 (L) 135 - 145 meq/L    Potassium reflex Magnesium 4.4 3.5 - 5.2 meq/L    Chloride 95 (L) 98 - 111 meq/L    CO2 28 23 - 33 meq/L    Glucose 165 (H) 70 - 108 mg/dL    BUN 14 7 - 22 mg/dL    CREATININE 0.6 0.4 - 1.2 mg/dL    Calcium 8.8 8.5 - 10.5 mg/dL   Troponin    Collection Time: 01/15/22  6:28 AM   Result Value Ref Range    Troponin T < 0.010 ng/ml   Anion Gap    Collection Time: 01/15/22  6:28 AM   Result Value Ref Range    Anion Gap 11.0 8.0 - 16.0 meq/L   Glomerular Filtration Rate, Estimated    Collection Time: 01/15/22  6:28 AM   Result Value Ref Range    Est, Glom Filt Rate >90 ml/min/1.73m2   Lactic acid, plasma    Collection Time: 01/15/22  6:29 AM   Result Value Ref Range    Lactic Acid 1.1 0.5 - 2.0 mmol/L   POCT glucose    Collection Time: 01/15/22  8:24 AM   Result Value Ref Range    POC Glucose 141 (H) 70 - 108 mg/dl   EKG 12 Lead    Collection Time: 01/15/22  8:53 AM   Result Value Ref Range    Ventricular Rate 57 BPM    Atrial Rate 57 BPM    P-R Interval 166 ms    QRS Duration 122 ms    Q-T Interval 484 ms    QTc Calculation (Bazett) 471 ms    P Axis 26 degrees    R Axis -50 degrees    T Axis -45 degrees   Troponin    Collection Time: 01/15/22 11:18 AM   Result Value Ref Range    Troponin T < 0.010 ng/ml   POCT glucose    Collection Time: 01/15/22 12:35 PM   Result Value Ref Range    POC Glucose 269 (H) 70 - 108 mg/dl   POCT glucose    Collection Time: 01/15/22  5:21 PM   Result Value Ref Range    POC Glucose 136 (H) 70 - 108 mg/dl   POCT glucose    Collection Time: 01/15/22  8:01 PM   Result Value Ref Range    POC Glucose 166 (H) 70 - 108 mg/dl   POCT glucose    Collection Time: 01/16/22  7:55 AM   Result Value Ref Range    POC Glucose 95 70 - 108 mg/dl        Microbiology:    Blood culture #1: No results found for: BC  Blood culture #2:No results found for: BLOODCULT2  Organism:  No results found for: LABGRAM  MRSA culture only:No results found for: [facility]  Urine culture:   Lab Results   Component Value Date    LABURIN [place] count: >100,000 CFU/mL 06/28/2019     Lab Results   Component Value Date    ORG Escherichia coli 06/28/2019      Respiratory culture: No results found for: CULTRESP  Aerobic and Anaerobic :  No results found for: LABAERO  No results found for: LABANAE    Urinalysis:     Lab Results   Component Value Date    NITRU Negative 06/28/2019    WBCUA 5-10 04/17/2018    BACTERIA MODERATE 04/17/2018    RBCUA 0-2 04/17/2018    BLOODU Small 06/28/2019    BLOODU NEGATIVE 04/30/2018    SPECGRAV 1.016 04/30/2018    GLUCOSEU Negative 06/28/2019       Radiology:  CTA CHEST W WO CONTRAST    Result Date: 1/14/2022  PROCEDURE: CTA CHEST W WO CONTRAST CLINICAL INFORMATION: elevated ddimer covid. COMPARISON: 2/17/2011 TECHNIQUE: 3 mm axial images were obtained through the chest after the administration of IV contrast.  A non-contrast localizer was obtained. 3D reconstructions were performed on the scanner to include MIP coronal and sagittal images through the chest. Isovue was the intravenous contrast utilized. All CT scans at this facility use dose modulation, iterative reconstruction, and/or weight-based dosing when appropriate to reduce radiation dose to as low as reasonably achievable. FINDINGS: There is adequate opacification of the pulmonary arteries. There are no intraluminal filling defects to suggest pulmonary embolism. Appearance been very similar to the prior exam. The main pulmonary artery is enlarged measuring 3.8 cm which may indicate pulmonary arterial hypertension. This accounts for some underfilling of the distal most subsegmental branches of the pulmonary arteries. The aorta is within acceptable limits. The heart size is normal. There is a very small right pleural effusion. Very subtle left paratracheal adenopathy short axis measurement of 10 mm. Prominent right hilar lymph nodes.  Scattered patchy infiltrates throughout both lungs. No suspicious osseous lesions are present. There are no suspicious findings in the imaged aspects of the upper abdomen. There are no pulmonary emboli. There is possible pulmonary arterial hypertension. Multifocal infiltrates throughout both lungs with a very small right pleural effusion. **This report has been created using voice recognition software. It may contain minor errors which are inherent in voice recognition technology. ** Final report electronically signed by Dr. Andres Desai on 1/14/2022 8:29 AM    XR CHEST PORTABLE    Result Date: 1/14/2022  1 view chest x-ray. Comparison: None Findings: An interstitial pattern is noted. Minimal patchy perihilar infiltrates are noted. The heart is upper limits normal for size, given the portable technique. There may be a small left pleural effusion. Surgical clips overlie the right axilla. Osseous structures are unremarkable. .     Impression: 1. Mild patchy infiltrates. This document has been electronically signed by: Becca Bo MD on 01/14/2022 06:06 AM       Consults:   PHARMACY TO DOSE MEDICATION    Discharge Medications:      Medication List      ASK your doctor about these medications    aspirin 81 MG EC tablet  Take 1 tablet by mouth daily Restart after lovenox done     benzonatate 200 MG capsule  Commonly known as: TESSALON  Take 1 capsule by mouth 3 times daily as needed for Cough     Blood Glucose Monitoring Suppl Kit  Check BS daily, Dx E11.9, Uses one touch ultra machine     blood glucose test strips strip  Commonly known as: ASCENSIA AUTODISC VI;ONE TOUCH ULTRA TEST VI  1 each by In Vitro route daily E11.9, check BS daily, uses One Touch Ultra II strips     citalopram 10 MG tablet  Commonly known as: CELEXA     Lancets Misc  1 each by Does not apply route daily Dx: E11.9, uses deliza lancets, check bs daily. metFORMIN 500 MG tablet  Commonly known as: GLUCOPHAGE  Take two tablets twice daily. metoprolol succinate 25 MG extended release tablet  Commonly known as: TOPROL XL  Take 1 tablet by mouth daily             Patient Instructions:    Discharge lab work: none  Activity: activity as tolerated  Diet: ADULT DIET; Regular; 4 carb choices (60 gm/meal)      Follow-up visits:   No follow-up provider specified. Disposition: home  Condition at Discharge: Stable    Time Spent: 50 minutes    Signed: Thank you Verona Bernard MD for the opportunity to be involved in this patient's care.     Electronically signed by Kellie Greenfield PA-C on 1/16/2022 at 9:41 AM  Discharging Hospitalist

## 2022-01-17 ENCOUNTER — CARE COORDINATION (OUTPATIENT)
Dept: CASE MANAGEMENT | Age: 82
End: 2022-01-17

## 2022-01-17 NOTE — CARE COORDINATION
Transitions of Care Call  Call within 2 business days of discharge: Yes    Patient: Isaías Rothman Patient : 1940   MRN: [id number]  Reason for Admission:  Hypoxia Covid PNA  Discharge Date: 22 RARS: Readmission Risk Score: 13.1 ( )      Last Discharge [facility]       Complaint Diagnosis Description Type Department Provider    22 Shortness of Breath COVID ED to Hosp-Admission (Discharged) (ADMITTED) BRYN Tavares PA-C; Joseph Ya . .. Spoke with pt's daughter Timo Castellanos  (HIPPA form verified)  Terri says [employer]. Terri reports her mom, Dottie Dailey is feeling fairly well, working on getting her energy level back up. Dottie Dailey denies shortness of breath, no cough, no wheezing, no chest pain. Dottie Dailey currently on 1-2L/NC of home O2, provided by [facility] prior to hospital DC. Had to  go up to 2L after taking a shower earlier today. Terri has checked mom's O2 sat level a couple times today--90-95%. Emphasized importance of using IS & Acapella machine q 1hr while awake, Dottie Dailey putting forth good effort on using both reguarly- dtr states Dottie Dailey is working on\"getting the ball higher on the IS\" Dottie Dailey denies N&V, no Diarrhea. Appetite fair, getting a little better per dtr. Reviewed AVS & DC meds, taking new med Decadron, & all other meds as PTA as prescribed. Dtr Timo Castellanos has scheduled follow up appt with PCP Dr Tl Patel ([facility] provider)  this Thurs 2022, in office visit, pt's son will take to appt. Was this an external facility discharge? No Discharge Facility: [facility]    Challenges to be reviewed by the provider   Additional needs identified to be addressed with provider: No  none                 Encounter was not routed to provider for escalation. Method of communication with provider: none. Discussed COVID-19 related testing which was: available at this time. Test results were: positive. Patient informed of results, if available? Yes.     Current Symptoms: no new symptoms and no worsening symptoms    Reviewed New or Changed Meds: yes    Do you have what you need at home?  Durable Medical Equipment ordered at discharge: [facility]/Outpatient orders at discharge: none   Was patient discharged with a pulse oximeter? Yes Discussed and confirmed pulse oximeter discharge instructions and when to notify provider or seek emergency care. Patient education provided: Reviewed appropriate site of care based on symptoms and resources available to patient including: PCP, Specialist, Urgent care clinics and When to call 911. Follow up appointment scheduled within 7 days of discharge: yes. If no appointment scheduled, scheduling offered: yes  No future appointments. Interventions: Scheduled appointment with PCP-Thurs 1/20/2022  Obtained and reviewed discharge summary and/or continuity of care documents  Education of patient/family/caregiver/guardian to support self-management--  Assessment and support for treatment adherence and medication management-DC meds reviewed  Reviewed discharge instructions, medical action plan and red flags with family who verbalized understanding. No further follow-up call indicated     Plan for next call: NA  Provided contact information for future needs.     Gold Finley RN -[phone]

## 2022-04-12 ENCOUNTER — HOSPITAL ENCOUNTER (OUTPATIENT)
Dept: GENERAL RADIOLOGY | Age: 82
Discharge: HOME | End: 2022-04-12
Payer: MEDICARE

## 2022-04-12 ENCOUNTER — HOSPITAL ENCOUNTER (OUTPATIENT)
Age: 82
Discharge: HOME | End: 2022-04-12
Payer: MEDICARE

## 2022-04-12 DIAGNOSIS — Z01.811 PRE-OP CHEST EXAM: ICD-10-CM

## 2022-04-12 DIAGNOSIS — I10 ESSENTIAL HYPERTENSION: ICD-10-CM

## 2022-04-12 LAB
ANION GAP SERPL CALCULATED.3IONS-SCNC: 10 MEQ/L (ref 8–16)
AVERAGE GLUCOSE: 138 MG/DL (ref 70–126)
BACTERIA: ABNORMAL /HPF
BASOPHILS # BLD: 0.4 %
BASOPHILS ABSOLUTE: 0 THOU/MM3 (ref 0–0.1)
BILIRUBIN URINE: NEGATIVE
BLOOD, URINE: NEGATIVE
BUN BLDV-MCNC: 18 MG/DL (ref 7–22)
CALCIUM SERPL-MCNC: 9.4 MG/DL (ref 8.5–10.5)
CASTS 2: ABNORMAL /LPF
CASTS UA: ABNORMAL /LPF
CHARACTER, URINE: CLEAR
CHLORIDE BLD-SCNC: 101 MEQ/L (ref 98–111)
CO2: 30 MEQ/L (ref 23–33)
COLOR: YELLOW
CREAT SERPL-MCNC: 0.7 MG/DL (ref 0.4–1.2)
CRYSTALS, UA: ABNORMAL
EKG ATRIAL RATE: 72 BPM
EKG P AXIS: 35 DEGREES
EKG P-R INTERVAL: 192 MS
EKG Q-T INTERVAL: 426 MS
EKG QRS DURATION: 142 MS
EKG QTC CALCULATION (BAZETT): 466 MS
EKG R AXIS: -65 DEGREES
EKG T AXIS: 16 DEGREES
EKG VENTRICULAR RATE: 72 BPM
EOSINOPHIL # BLD: 3.5 %
EOSINOPHILS ABSOLUTE: 0.2 THOU/MM3 (ref 0–0.4)
EPITHELIAL CELLS, UA: ABNORMAL /HPF
ERYTHROCYTE [DISTWIDTH] IN BLOOD BY AUTOMATED COUNT: 13.9 % (ref 11.5–14.5)
ERYTHROCYTE [DISTWIDTH] IN BLOOD BY AUTOMATED COUNT: 46.6 FL (ref 35–45)
GFR SERPL CREATININE-BSD FRML MDRD: 80 ML/MIN/1.73M2
GLUCOSE BLD-MCNC: 149 MG/DL (ref 70–108)
GLUCOSE URINE: NEGATIVE MG/DL
HBA1C MFR BLD: 6.6 % (ref 4.4–6.4)
HCT VFR BLD CALC: 42.1 % (ref 37–47)
HEMOGLOBIN: 12.9 GM/DL (ref 12–16)
IMMATURE GRANS (ABS): 0.01 THOU/MM3 (ref 0–0.07)
IMMATURE GRANULOCYTES: 0.1 %
KETONES, URINE: NEGATIVE
LEUKOCYTE ESTERASE, URINE: ABNORMAL
LYMPHOCYTES # BLD: 37.1 %
LYMPHOCYTES ABSOLUTE: 2.6 THOU/MM3 (ref 1–4.8)
MCH RBC QN AUTO: 28.2 PG (ref 26–33)
MCHC RBC AUTO-ENTMCNC: 30.6 GM/DL (ref 32.2–35.5)
MCV RBC AUTO: 91.9 FL (ref 81–99)
MISCELLANEOUS 2: ABNORMAL
MONOCYTES # BLD: 10 %
MONOCYTES ABSOLUTE: 0.7 THOU/MM3 (ref 0.4–1.3)
NITRITE, URINE: NEGATIVE
NUCLEATED RED BLOOD CELLS: 0 /100 WBC
PH UA: 5 (ref 5–9)
PLATELET # BLD: 242 THOU/MM3 (ref 130–400)
PMV BLD AUTO: 10.1 FL (ref 9.4–12.4)
POTASSIUM SERPL-SCNC: 4.7 MEQ/L (ref 3.5–5.2)
PROTEIN UA: NEGATIVE
RBC # BLD: 4.58 MILL/MM3 (ref 4.2–5.4)
RBC URINE: ABNORMAL /HPF
RENAL EPITHELIAL, UA: ABNORMAL
SEG NEUTROPHILS: 48.9 %
SEGMENTED NEUTROPHILS ABSOLUTE COUNT: 3.4 THOU/MM3 (ref 1.8–7.7)
SODIUM BLD-SCNC: 141 MEQ/L (ref 135–145)
SPECIFIC GRAVITY, URINE: 1.02 (ref 1–1.03)
UROBILINOGEN, URINE: 0.2 EU/DL (ref 0–1)
WBC # BLD: 6.9 THOU/MM3 (ref 4.8–10.8)
WBC UA: ABNORMAL /HPF
YEAST: ABNORMAL

## 2022-04-12 PROCEDURE — 85025 COMPLETE CBC W/AUTO DIFF WBC: CPT

## 2022-04-12 PROCEDURE — 81001 URINALYSIS AUTO W/SCOPE: CPT

## 2022-04-12 PROCEDURE — 71046 X-RAY EXAM CHEST 2 VIEWS: CPT

## 2022-04-12 PROCEDURE — 36415 COLL VENOUS BLD VENIPUNCTURE: CPT

## 2022-04-12 PROCEDURE — 93005 ELECTROCARDIOGRAM TRACING: CPT | Performed by: ORTHOPAEDIC SURGERY

## 2022-04-12 PROCEDURE — 80048 BASIC METABOLIC PNL TOTAL CA: CPT

## 2022-04-12 PROCEDURE — 87081 CULTURE SCREEN ONLY: CPT

## 2022-04-12 PROCEDURE — 93010 ELECTROCARDIOGRAM REPORT: CPT | Performed by: INTERNAL MEDICINE

## 2022-04-12 PROCEDURE — 83036 HEMOGLOBIN GLYCOSYLATED A1C: CPT

## 2022-04-12 NOTE — ED NOTES
EKG complete. Pt does have EKG changes from previous visit. Pt denies pain or SOB. Called [facility] at this time and faxed EKG to Dr Hoa Valdivia. Derick Whaley CNP reviewed EKG at this time also.      Ernesto Demarco RN  04/12/22 [id number]

## 2022-04-13 LAB — MRSA SCREEN: NORMAL

## 2022-05-13 ENCOUNTER — HOSPITAL ENCOUNTER (INPATIENT)
Age: 82
LOS: 1 days | Discharge: HOME | DRG: 315 | End: 2022-05-14
Attending: STUDENT IN AN ORGANIZED HEALTH CARE EDUCATION/TRAINING PROGRAM | Admitting: INTERNAL MEDICINE
Payer: MEDICARE

## 2022-05-13 ENCOUNTER — APPOINTMENT (OUTPATIENT)
Dept: GENERAL RADIOLOGY | Age: 82
DRG: 315 | End: 2022-05-13
Payer: MEDICARE

## 2022-05-13 DIAGNOSIS — R07.89 OTHER CHEST PAIN: ICD-10-CM

## 2022-05-13 DIAGNOSIS — I10 ESSENTIAL HYPERTENSION: ICD-10-CM

## 2022-05-13 DIAGNOSIS — Z96.652 S/P TKR (TOTAL KNEE REPLACEMENT), LEFT: ICD-10-CM

## 2022-05-13 DIAGNOSIS — M17.10 OSTEOARTHRITIS, LOCALIZED, KNEE: ICD-10-CM

## 2022-05-13 DIAGNOSIS — R94.31 ACUTE ELECTROCARDIOGRAM CHANGES: ICD-10-CM

## 2022-05-13 DIAGNOSIS — R07.9 CHEST PAIN, UNSPECIFIED TYPE: Primary | ICD-10-CM

## 2022-05-13 LAB
ALBUMIN SERPL-MCNC: 3.9 G/DL (ref 3.5–5.1)
ALP BLD-CCNC: 82 U/L (ref 38–126)
ALT SERPL-CCNC: 11 U/L (ref 11–66)
ANION GAP SERPL CALCULATED.3IONS-SCNC: 9 MEQ/L (ref 8–16)
AST SERPL-CCNC: 23 U/L (ref 5–40)
BASOPHILS # BLD: 0.3 %
BASOPHILS ABSOLUTE: 0 THOU/MM3 (ref 0–0.1)
BILIRUB SERPL-MCNC: 0.2 MG/DL (ref 0.3–1.2)
BUN BLDV-MCNC: 19 MG/DL (ref 7–22)
CALCIUM SERPL-MCNC: 8.6 MG/DL (ref 8.5–10.5)
CHLORIDE BLD-SCNC: 101 MEQ/L (ref 98–111)
CO2: 26 MEQ/L (ref 23–33)
CREAT SERPL-MCNC: 0.6 MG/DL (ref 0.4–1.2)
EKG ATRIAL RATE: 71 BPM
EKG P AXIS: 19 DEGREES
EKG P-R INTERVAL: 202 MS
EKG Q-T INTERVAL: 444 MS
EKG QRS DURATION: 146 MS
EKG QTC CALCULATION (BAZETT): 482 MS
EKG R AXIS: -61 DEGREES
EKG T AXIS: -18 DEGREES
EKG VENTRICULAR RATE: 71 BPM
EOSINOPHIL # BLD: 0.5 %
EOSINOPHILS ABSOLUTE: 0 THOU/MM3 (ref 0–0.4)
ERYTHROCYTE [DISTWIDTH] IN BLOOD BY AUTOMATED COUNT: 13.1 % (ref 11.5–14.5)
ERYTHROCYTE [DISTWIDTH] IN BLOOD BY AUTOMATED COUNT: 43.3 FL (ref 35–45)
GFR SERPL CREATININE-BSD FRML MDRD: > 90 ML/MIN/1.73M2
GLUCOSE BLD-MCNC: 161 MG/DL (ref 70–108)
GLUCOSE BLD-MCNC: 258 MG/DL (ref 70–108)
HCT VFR BLD CALC: 39.6 % (ref 37–47)
HEMOGLOBIN: 12.2 GM/DL (ref 12–16)
IMMATURE GRANS (ABS): 0.03 THOU/MM3 (ref 0–0.07)
IMMATURE GRANULOCYTES: 0.4 %
LYMPHOCYTES # BLD: 9 %
LYMPHOCYTES ABSOLUTE: 0.7 THOU/MM3 (ref 1–4.8)
MCH RBC QN AUTO: 27.7 PG (ref 26–33)
MCHC RBC AUTO-ENTMCNC: 30.8 GM/DL (ref 32.2–35.5)
MCV RBC AUTO: 90 FL (ref 81–99)
MONOCYTES # BLD: 3 %
MONOCYTES ABSOLUTE: 0.2 THOU/MM3 (ref 0.4–1.3)
NUCLEATED RED BLOOD CELLS: 0 /100 WBC
OSMOLALITY CALCULATION: 277.7 MOSMOL/KG (ref 275–300)
PLATELET # BLD: 184 THOU/MM3 (ref 130–400)
PMV BLD AUTO: 9.3 FL (ref 9.4–12.4)
POTASSIUM REFLEX MAGNESIUM: 4.3 MEQ/L (ref 3.5–5.2)
PRO-BNP: 754.8 PG/ML (ref 0–1800)
RBC # BLD: 4.4 MILL/MM3 (ref 4.2–5.4)
SEG NEUTROPHILS: 86.8 %
SEGMENTED NEUTROPHILS ABSOLUTE COUNT: 6.6 THOU/MM3 (ref 1.8–7.7)
SODIUM BLD-SCNC: 136 MEQ/L (ref 135–145)
TOTAL PROTEIN: 6.4 G/DL (ref 6.1–8)
TROPONIN T: < 0.01 NG/ML
WBC # BLD: 7.6 THOU/MM3 (ref 4.8–10.8)

## 2022-05-13 PROCEDURE — 71046 X-RAY EXAM CHEST 2 VIEWS: CPT

## 2022-05-13 PROCEDURE — 36415 COLL VENOUS BLD VENIPUNCTURE: CPT

## 2022-05-13 PROCEDURE — 83880 ASSAY OF NATRIURETIC PEPTIDE: CPT

## 2022-05-13 PROCEDURE — 82948 REAGENT STRIP/BLOOD GLUCOSE: CPT

## 2022-05-13 PROCEDURE — 2140000000 HC CCU INTERMEDIATE R&B

## 2022-05-13 PROCEDURE — 99285 EMERGENCY DEPT VISIT HI MDM: CPT

## 2022-05-13 PROCEDURE — 93010 ELECTROCARDIOGRAM REPORT: CPT | Performed by: NUCLEAR MEDICINE

## 2022-05-13 PROCEDURE — 6360000002 HC RX W HCPCS: Performed by: INTERNAL MEDICINE

## 2022-05-13 PROCEDURE — 85025 COMPLETE CBC W/AUTO DIFF WBC: CPT

## 2022-05-13 PROCEDURE — 84484 ASSAY OF TROPONIN QUANT: CPT

## 2022-05-13 PROCEDURE — 6370000000 HC RX 637 (ALT 250 FOR IP): Performed by: INTERNAL MEDICINE

## 2022-05-13 PROCEDURE — 80053 COMPREHEN METABOLIC PANEL: CPT

## 2022-05-13 PROCEDURE — 93005 ELECTROCARDIOGRAM TRACING: CPT | Performed by: STUDENT IN AN ORGANIZED HEALTH CARE EDUCATION/TRAINING PROGRAM

## 2022-05-13 RX ORDER — INSULIN LISPRO 100 [IU]/ML
0-3 INJECTION, SOLUTION INTRAVENOUS; SUBCUTANEOUS NIGHTLY
Status: DISCONTINUED | OUTPATIENT
Start: 2022-05-13 | End: 2022-05-14 | Stop reason: HOSPADM

## 2022-05-13 RX ORDER — ACETAMINOPHEN 325 MG/1
650 TABLET ORAL EVERY 6 HOURS PRN
Status: DISCONTINUED | OUTPATIENT
Start: 2022-05-13 | End: 2022-05-14 | Stop reason: HOSPADM

## 2022-05-13 RX ORDER — TRAMADOL HYDROCHLORIDE 50 MG/1
50 TABLET ORAL EVERY 6 HOURS
Status: DISCONTINUED | OUTPATIENT
Start: 2022-05-13 | End: 2022-05-14 | Stop reason: HOSPADM

## 2022-05-13 RX ORDER — INSULIN LISPRO 100 [IU]/ML
0-6 INJECTION, SOLUTION INTRAVENOUS; SUBCUTANEOUS
Status: DISCONTINUED | OUTPATIENT
Start: 2022-05-13 | End: 2022-05-14 | Stop reason: HOSPADM

## 2022-05-13 RX ORDER — MULTIVIT-MIN/IRON/FOLIC ACID/K 18-600-40
CAPSULE ORAL
COMMUNITY

## 2022-05-13 RX ORDER — ATORVASTATIN CALCIUM 40 MG/1
40 TABLET, FILM COATED ORAL NIGHTLY
Status: DISCONTINUED | OUTPATIENT
Start: 2022-05-13 | End: 2022-05-14 | Stop reason: HOSPADM

## 2022-05-13 RX ORDER — ENOXAPARIN SODIUM 100 MG/ML
40 INJECTION SUBCUTANEOUS DAILY
Status: DISCONTINUED | OUTPATIENT
Start: 2022-05-14 | End: 2022-05-14

## 2022-05-13 RX ORDER — ASPIRIN 81 MG/1
81 TABLET ORAL NIGHTLY
Status: CANCELLED | OUTPATIENT
Start: 2022-05-13

## 2022-05-13 RX ORDER — ONDANSETRON 2 MG/ML
4 INJECTION INTRAMUSCULAR; INTRAVENOUS EVERY 6 HOURS PRN
Status: DISCONTINUED | OUTPATIENT
Start: 2022-05-13 | End: 2022-05-14 | Stop reason: HOSPADM

## 2022-05-13 RX ORDER — ASCORBIC ACID 500 MG
1000 TABLET ORAL DAILY
COMMUNITY

## 2022-05-13 RX ORDER — METOPROLOL SUCCINATE 50 MG/1
50 TABLET, EXTENDED RELEASE ORAL DAILY
Status: DISCONTINUED | OUTPATIENT
Start: 2022-05-13 | End: 2022-05-14

## 2022-05-13 RX ORDER — ZINC GLUCONATE 50 MG
50 TABLET ORAL DAILY
COMMUNITY

## 2022-05-13 RX ORDER — CITALOPRAM 20 MG/1
10 TABLET ORAL DAILY
Status: DISCONTINUED | OUTPATIENT
Start: 2022-05-13 | End: 2022-05-14 | Stop reason: HOSPADM

## 2022-05-13 RX ADMIN — Medication 2000 MG: at 21:08

## 2022-05-13 RX ADMIN — ASPIRIN 325 MG: 325 TABLET, COATED ORAL at 21:08

## 2022-05-13 RX ADMIN — METOPROLOL SUCCINATE 50 MG: 50 TABLET, EXTENDED RELEASE ORAL at 21:08

## 2022-05-13 RX ADMIN — TRAMADOL HYDROCHLORIDE 50 MG: 50 TABLET, COATED ORAL at 21:08

## 2022-05-13 RX ADMIN — ATORVASTATIN CALCIUM 40 MG: 40 TABLET, FILM COATED ORAL at 21:08

## 2022-05-13 RX ADMIN — CITALOPRAM 10 MG: 20 TABLET, FILM COATED ORAL at 21:08

## 2022-05-13 RX ADMIN — INSULIN LISPRO 2 UNITS: 100 INJECTION, SOLUTION INTRAVENOUS; SUBCUTANEOUS at 21:20

## 2022-05-13 ASSESSMENT — PAIN DESCRIPTION - DESCRIPTORS
DESCRIPTORS: HEAVINESS
DESCRIPTORS: HEAVINESS

## 2022-05-13 ASSESSMENT — ENCOUNTER SYMPTOMS
ABDOMINAL PAIN: 0
EYE REDNESS: 0
BACK PAIN: 0
VOMITING: 0
CONSTIPATION: 0
SHORTNESS OF BREATH: 1
DIARRHEA: 0
SORE THROAT: 0
RHINORRHEA: 0
NAUSEA: 0

## 2022-05-13 ASSESSMENT — PAIN DESCRIPTION - ORIENTATION
ORIENTATION: LEFT
ORIENTATION: LEFT

## 2022-05-13 ASSESSMENT — PAIN - FUNCTIONAL ASSESSMENT
PAIN_FUNCTIONAL_ASSESSMENT: 0-10
PAIN_FUNCTIONAL_ASSESSMENT: 0-10

## 2022-05-13 ASSESSMENT — LIFESTYLE VARIABLES: HOW OFTEN DO YOU HAVE A DRINK CONTAINING ALCOHOL: NEVER

## 2022-05-13 ASSESSMENT — PAIN DESCRIPTION - FREQUENCY
FREQUENCY: CONTINUOUS
FREQUENCY: CONTINUOUS

## 2022-05-13 ASSESSMENT — PAIN DESCRIPTION - LOCATION
LOCATION: KNEE
LOCATION: KNEE

## 2022-05-13 ASSESSMENT — PAIN SCALES - GENERAL
PAINLEVEL_OUTOF10: 5
PAINLEVEL_OUTOF10: 5

## 2022-05-13 ASSESSMENT — PAIN DESCRIPTION - PAIN TYPE: TYPE: ACUTE PAIN

## 2022-05-13 NOTE — H&P
HISTORY AND PHYSICAL             Date: 5/13/2022        Patient Name: Pancho Lundberg     YOB: 1940      Age:  80 y.o. Chief Complaint   Chest pain and EKG changes post op in  recovery         History Obtained From   patient and daughter    History of Present Illness   77-year-old man with past medical history of diabetes hypertension or family denying coronary disease but there is mention of angina in her prior records apparently underwent left total knee replacement at an outside facility. .  Patient had developed significant chest pain with associated drop in her heart rate in the 40s. She did have an EKG that showed significant bradycardia with T wave inversion in inferior leads. Blood pressure was also in the 90s. Patient was given fluids and we were contacted for further intervention. Since patient was symptomatic he had significant drop in blood heart rate dose for Deprol. Patient be seen by cardiology and plans was transferred here to [facility].  Patient repeat EKG per ER physician was unchanged with T wave changes noted in inferior leads. Patient was not bradycardic or hypotensive now. Patient could not recollect much of the events now. She denies having any chest pain at time of examination. She does drop her pulse ox and family did mention she has apnea but never been diagnosed with sleep apnea. Patient did have echo and stress test preop no reversible ischemia noted. Echo showed EF of 55% mild aortic stenosis with a valve area of 1.2 cm noted. I did speak with cardiology on-call.     Past Medical History     Past Medical History:   Diagnosis Date    Arthritis     hands, knee    Breast cancer ([address]     Right Breast    CAD (coronary artery disease)     Cochlear implant in place 2009    Left    [place] (hard of hearing)     Severe    Hypertension     Persistent proteinuria associated with type 2 diabetes mellitus (HCC)     Type II or unspecified type diabetes mellitus without mention of complication, not stated as uncontrolled     Varicose vein of leg 12/2016        Past Surgical History     Past Surgical History:   Procedure Laterality Date    BREAST LUMPECTOMY  2005    Right    CHOLECYSTECTOMY      COCHLEAR IMPLANT Left     raven    COLONOSCOPY      GALLBLADDER SURGERY      HYSTERECTOMY      JOINT REPLACEMENT      NE TOTAL KNEE ARTHROPLASTY Right 5/1/2018    RIGHT KNEE TOTAL ARTHROPLASTY, LEFT KNEE INJECTION performed by Jace Wilks MD at [facility]ilateral 01/2017    [facility]    VASCULAR SURGERY          Medications Prior to Admission     Prior to Admission medications    Medication Sig Start Date End Date Taking? Authorizing Provider   citalopram (CELEXA) 10 MG tablet Take 10 mg by mouth daily    Historical Provider, MD   Lancets MISC 1 each by Does not apply route daily Dx: E11.9, uses deliza lancets, check bs daily. 6/30/20   Job Scott MD   blood glucose test strips (ASCENSIA AUTODISC VI;ONE TOUCH ULTRA TEST VI) strip 1 each by In Vitro route daily E11.9, check BS daily, uses One Touch Ultra II strips 6/30/20   Job Scott MD   benzonatate (TESSALON) 200 MG capsule Take 1 capsule by mouth 3 times daily as needed for Cough 3/17/20   Job Scott MD   Blood Glucose Monitoring Suppl KIT Check BS daily, Dx E11.9, Uses one touch ultra machine 2/21/20   Job Scott MD   metFORMIN (GLUCOPHAGE) 500 MG tablet Take two tablets twice daily.  6/28/19   Job Scott MD   metoprolol succinate (TOPROL XL) 25 MG extended release tablet Take 1 tablet by mouth daily  Patient taking differently: Take 50 mg by mouth daily  6/28/19   Job Scott MD   aspirin 81 MG EC tablet Take 1 tablet by mouth daily Restart after lovenox done  Patient taking differently: Take 81 mg by mouth nightly Restart after lovenox done 6/28/19   Job Scott MD        Allergies   Adhesive tape    Social History     Social History     Tobacco History     Smoking Status  Never Smoker    Smokeless Tobacco Use  Never Used          Alcohol History     Alcohol Use Status  No          Drug Use     Drug Use Status  No          Sexual Activity     Sexually Active  Not Currently              She lives with her daughter. She is . Had 5 children. No smoking or alcohol. Uses a cane to ambulate.   Family History     Family History   Problem Relation Age of Onset    Heart Disease Mother     High Blood Pressure Mother     High Blood Pressure Father        Review of Systems   General ROS: Complains of dry mouth sleepy but improving per daughter  Ophthalmic ROS: No blurred vision or eye pain  Hematological and Lymphatic ROS: No unusual bleeding  Respiratory ROS: Family did agree that she does have apnea when she sleeps but never been worked up for sleep apnea no cough no fever no shortness of breath  Cardiovascular ROS: Chest pain earlier in recovery with drop in heart rate  Gastrointestinal ROS: No abdomen pain no nausea vomiting  Musculoskeletal ROS: Knee pain  Neurological ROS: No confusion no numbness or tingling  Dermatological ROS: No skin rash    Physical Exam   /75   Pulse 70   Temp 97.3 °F (36.3 °C) (Oral)   Resp 18   Wt 203 lb (92.1 kg)   LMP  (LMP Unknown)   SpO2 94%   BMI 34.84 kg/m²     CONSTITUTIONAL: More awake now oriented to person complains of dry mouth  EYES: Pupils react no icterus  NECK: Thick and supple no bruit  LUNGS: Air exchange is appreciated bilaterally with no rales  CARDIOVASCULAR: S1-S2 heard regular  ABDOMEN: Soft obese bowel sounds heard no guarding no tenderness  MUSCULOSKELETAL: Dressing on the left leg most extremities  NEUROLOGIC: Oriented to person place and time follows instructions  SKIN: Warm and dry  Extremities no ankle edema      Labs      Recent Results (from the past 24 hour(s))   Comprehensive Metabolic Panel w/ Reflex to MG    Collection Time: 05/13/22  1:06 PM   Result Value Ref Range    Glucose 161 (H) 70 - 108 mg/dL    CREATININE 0.6 0.4 - 1.2 mg/dL    BUN 19 7 - 22 mg/dL    Sodium 136 135 - 145 meq/L    Potassium reflex Magnesium 4.3 3.5 - 5.2 meq/L    Chloride 101 98 - 111 meq/L    CO2 26 23 - 33 meq/L    Calcium 8.6 8.5 - 10.5 mg/dL    AST 23 5 - 40 U/L    Alkaline Phosphatase 82 38 - 126 U/L    Total Protein 6.4 6.1 - 8.0 g/dL    Albumin 3.9 3.5 - 5.1 g/dL    Total Bilirubin 0.2 (L) 0.3 - 1.2 mg/dL    ALT 11 11 - 66 U/L   Troponin    Collection Time: 05/13/22  1:06 PM   Result Value Ref Range    Troponin T < 0.010 ng/ml   Brain Natriuretic Peptide    Collection Time: 05/13/22  1:06 PM   Result Value Ref Range    Pro-.8 0.0 - 1800.0 pg/mL   Anion Gap    Collection Time: 05/13/22  1:06 PM   Result Value Ref Range    Anion Gap 9.0 8.0 - 16.0 meq/L   Osmolality    Collection Time: 05/13/22  1:06 PM   Result Value Ref Range    Osmolality Calc 277.7 275.0 - 300.0 mOsmol/kg   Glomerular Filtration Rate, Estimated    Collection Time: 05/13/22  1:06 PM   Result Value Ref Range    Est, Glom Filt Rate >90 ml/min/1.73m2   CBC with Auto Differential    Collection Time: 05/13/22  1:07 PM   Result Value Ref Range    WBC 7.6 4.8 - 10.8 thou/mm3    RBC 4.40 4.20 - 5.40 mill/mm3    Hemoglobin 12.2 12.0 - 16.0 gm/dl    Hematocrit 39.6 37.0 - 47.0 %    MCV 90.0 81.0 - 99.0 fL    MCH 27.7 26.0 - 33.0 pg    MCHC 30.8 (L) 32.2 - 35.5 gm/dl    RDW-CV 13.1 11.5 - 14.5 %    RDW-SD 43.3 35.0 - 45.0 fL    Platelets 972 579 - 666 thou/mm3    MPV 9.3 (L) 9.4 - 12.4 fL    Seg Neutrophils 86.8 %    Lymphocytes 9.0 %    Monocytes 3.0 %    Eosinophils 0.5 %    Basophils 0.3 %    Immature Granulocytes 0.4 %    Segs Absolute 6.6 1.8 - 7.7 thou/mm3    Lymphocytes Absolute 0.7 (L) 1.0 - 4.8 thou/mm3    Monocytes Absolute 0.2 (L) 0.4 - 1.3 thou/mm3    Eosinophils Absolute 0.0 0.0 - 0.4 thou/mm3    Basophils Absolute 0.0 0.0 - 0.1 thou/mm3    Immature Grans (Abs) 0.03 0.00 - 0.07 thou/mm3    nRBC 0 /100 wbc Imaging/Diagnostics Last 24 Hours   XR CHEST (2 VW)    Result Date: 5/13/2022  PROCEDURE: XR CHEST (2 VW) CLINICAL INFORMATION: chest pain. COMPARISON: Chest x-ray dated 12 April 2022. TECHNIQUE: PA and lateral views the chest. FINDINGS: There is cardiomegaly. .  There is atherosclerotic calcification in the aortic arch. . There is an elevated right hemidiaphragm. There  are no pulmonary infiltrates or effusions. The pulmonary vascularity is normal. There are postoperative changes along the right chest wall. .  There is thoracic spondylosis. No interval change since previous study dated 12 April 2022. **This report has been created using voice recognition software. It may contain minor errors which are inherent in voice recognition technology. ** Final report electronically signed by DR Kevin Gibbs on 5/13/2022 2:13 PM    Assessment    Chest pain with bradycardia and hypotension during post op  Status post left total knee replacement 5/13  Hypotension  Hyperlipidemia  History of breast cancer status postlumpectomy surgery and radiation  Diabetes  Obesity  Concern for sleep apnea      Plan   As patient is chest pain-free now we will hold off on IV nitro  We will continue beta-blockers aspirin statins  Will check lipid panel  Monitor cardiac enzymes  Cardiology has been consulted  Will add Lovenox if okay with surgery  Follow-up with renal function no stable restart Glucophage  Monitor blood sugars  Will have pulmonary consulted as she is high risk for sleep apnea symptoms  PT with consult orthopedic surgeon  Discussed plan of care with daughter at bedside  Resume home medications from a while including pain medications and bowel program    Consultations Ordered:  IP CONSULT TO CARDIOLOGY  IP CONSULT TO ORTHOPEDIC SURGERY    Electronically signed by Moose Walker MD on 5/13/22 at 5:29 PM EDT

## 2022-05-13 NOTE — ED NOTES
Pt presents to ED via [facility] from [facility] for c/o EKG changes. Pt was at [facility] and had a total L knee replacement this morning. Pt was in recovery and had an EKG performed. It is reported that pt had EKG changes from morning EKG and was sent in. Upon initial assessment, pt is alert and oriented to name, , and month. Pt slightly confused at this time. Pt presents with 20g IVs x2. L forearm and R hand. Pt reports \"heaviness\" in the L knee only. Pt currently denies chest pain, shortness of breath, abdominal pain, n/v/d, cough and fever. Pt presents on 4LPM via nc for O2 in the high 80s. Pt able to be weaned down to 1LPM during assessment d/t maintaining 98% oxygen. Family at bedside. EKG completed upon arrival. Monitor applied and VS as noted. Awaiting provider assessment. Will monitor.      Laura Cotton RN  [phone]

## 2022-05-13 NOTE — ED PROVIDER NOTES
[place] ENCOUNTER          Pt Name: Ella Hermosillo  MRN: [id number]  [place] 1940  Date of evaluation: 5/13/2022  Physician: Devi Bauman MD    CHIEF COMPLAINT       Chief Complaint   Patient presents with    Other     EKG changes after sx today     History obtained from the patient. HISTORY OF PRESENT ILLNESS    HPI  Ella Hermosillo is a 80 y.o. female with a history of CAD, type 2 diabetes, hypertension who presents to the emergency department for evaluation of EKG changes. Patient underwent left sided total knee replacement at [facility] prior to arrival.  After awakening, patient was complaining of chest pain and shortness of breath. Patient was given a nitro which resolved her symptoms. There was concern for T wave inversions in the inferior and lateral leads and EKG overall appeared changed from preop EKG. Patient was transferred to the emergency department for further work-up and evaluation. Patient still sleepy from anesthesia but able to answer questions and denies any chest pain or shortness of breath at this time. She only reports minimal pain in the left knee. Per family, patient had been feeling well prior to surgery and had had no other complaints. The patient has no other acute complaints at this time. REVIEW OF SYSTEMS   Review of Systems   Constitutional: Negative for chills and fever. HENT: Negative for rhinorrhea and sore throat. Eyes: Negative for redness and visual disturbance. Respiratory: Positive for shortness of breath. Cardiovascular: Positive for chest pain. Gastrointestinal: Negative for abdominal pain, constipation, diarrhea, nausea and vomiting. Endocrine: Negative for polyuria. Genitourinary: Negative for dysuria and flank pain. Musculoskeletal: Positive for arthralgias. Negative for back pain and myalgias. Skin: Negative for rash. Neurological: Negative for syncope and headaches. Psychiatric/Behavioral: Negative for confusion.          PAST MEDICAL AND SURGICAL HISTORY     Past Medical History:   Diagnosis Date    Arthritis     hands, knee    Breast cancer ([facility] [address]     Right Breast    CAD (coronary artery disease)     Cochlear implant in place 2009    Left    [place] (hard of hearing)     Severe    Hypertension     Persistent proteinuria associated with type 2 diabetes mellitus ([facility] [address]     Type II or unspecified type diabetes mellitus without mention of complication, not stated as uncontrolled     Varicose vein of leg 12/2016     Past Surgical History:   Procedure Laterality Date    BREAST LUMPECTOMY  2005    Right    CHOLECYSTECTOMY      COCHLEAR IMPLANT Left     raven    COLONOSCOPY      GALLBLADDER SURGERY      HYSTERECTOMY      JOINT REPLACEMENT      VT TOTAL KNEE ARTHROPLASTY Right 5/1/2018    RIGHT KNEE TOTAL ARTHROPLASTY, LEFT KNEE INJECTION performed by Ramón Olivares MD at [facility]ilateral 01/2017    [facility]    VASCULAR SURGERY           MEDICATIONS     Current Facility-Administered Medications:     metoprolol succinate (TOPROL XL) extended release tablet 50 mg, 50 mg, Oral, Daily, Ulises Prieto MD, 50 mg at 05/13/22 2108    citalopram (CELEXA) tablet 10 mg, 10 mg, Oral, Daily, Ulises Prieto MD, 10 mg at 05/13/22 2108    insulin lispro (HUMALOG) injection vial 0-6 Units, 0-6 Units, SubCUTAneous, TID WC, Ulises Prieto MD    insulin lispro (HUMALOG) injection vial 0-3 Units, 0-3 Units, SubCUTAneous, Nightly, Ulises Prieto MD, 2 Units at 05/13/22 2120    traMADol (ULTRAM) tablet 50 mg, 50 mg, Oral, Q6H, Ulises Prieto MD, 50 mg at 05/13/22 2108    acetaminophen (TYLENOL) tablet 650 mg, 650 mg, Oral, Q6H PRN, Ulises Prieto MD    ceFAZolin (ANCEF) 2000 mg in sterile water 20 mL IV syringe, 2,000 mg, IntraVENous, Q8H, Ulises Prieto MD, 2,000 mg at 05/13/22 2108    aspirin EC tablet 325 mg, 325 mg, Oral, Daily, Jerrod Coleman MD, 325 mg at 05/13/22 2108    ondansetron (ZOFRAN) injection 4 mg, 4 mg, IntraVENous, Q6H PRN, Jerrod Coleman MD  Black  [START ON 5/14/2022] enoxaparin (LOVENOX) injection 40 mg, 40 mg, SubCUTAneous, Daily, Jerrod Coleman MD    atorvastatin (LIPITOR) tablet 40 mg, 40 mg, Oral, Nightly, Jerrod Coleman MD, 40 mg at 05/13/22 2108      SOCIAL HISTORY     Social History     Social History Narrative    Not on file     Social History     Tobacco Use    Smoking status: Never Smoker    Smokeless tobacco: Never Used   Vaping Use    Vaping Use: Never used   Substance Use Topics    Alcohol use: No    Drug use: No         ALLERGIES     Allergies   Allergen Reactions    Adhesive Tape      Other reaction(s): Blisters  Pulls skin off         FAMILY HISTORY     Family History   Problem Relation Age of Onset    Heart Disease Mother     High Blood Pressure Mother     High Blood Pressure Father          PREVIOUS RECORDS   Previous records reviewed:   Discharge summary January 2022 for COVID infectino. PHYSICAL EXAM     ED Triage Vitals [05/13/22 1214]   BP Temp Temp Source Pulse Resp SpO2 Height Weight   135/73 97.3 °F (36.3 °C) Oral 74 18 98 % -- 203 lb (92.1 kg)     Initial vital signs and nursing assessment reviewed and normal. Body mass index is 34.84 kg/m². Pulsoximetry is normal per my interpretation. Additional Vital Signs:  Vitals:    05/13/22 2138   BP:    Pulse:    Resp: 18   Temp:    SpO2:        Physical Exam  Vitals and nursing note reviewed. Constitutional:       General: She is sleeping. She is not in acute distress. Appearance: Normal appearance. Comments: Somnolent but arouses to voice   HENT:      Head: Normocephalic and atraumatic. Mouth/Throat:      Mouth: Mucous membranes are moist.   Eyes:      Extraocular Movements: Extraocular movements intact.       Conjunctiva/sclera: Conjunctivae normal.      Pupils: Pupils are equal, round, and reactive to light. Cardiovascular:      Rate and Rhythm: Normal rate and regular rhythm. Pulses: Normal pulses. Heart sounds: Normal heart sounds. Pulmonary:      Effort: Pulmonary effort is normal.      Breath sounds: Normal breath sounds. Abdominal:      General: Abdomen is flat. Palpations: Abdomen is soft. Tenderness: There is no abdominal tenderness. Musculoskeletal:      Cervical back: Normal range of motion and neck supple. Comments: Bandages to the entire left knee and lower leg, lower extremity pulses equal   Skin:     General: Skin is warm and dry. Capillary Refill: Capillary refill takes less than 2 seconds. Neurological:      General: No focal deficit present. Mental Status: She is oriented to person, place, and time. Psychiatric:         Mood and Affect: Mood normal.         Behavior: Behavior normal.             MEDICAL DECISION MAKING   Initial Assessment:   Etta Woodson is a 80 y.o. female with a history of CAD, type 2 diabetes, hypertension who presents to the emergency department for evaluation of EKG changes with associated chest pain/SOB post-operatively that improved after nitro. Patient is hemodynamically stable and in no distress. Differential diagnosis includes but is not limited to ACS, anesthesia reaction, pneumothorax, PE, pneumonia.      Plan:    CBC, BMP, troponin, BNP   EKG, CXR        ED RESULTS   Laboratory results:  Labs Reviewed   CBC WITH AUTO DIFFERENTIAL - Abnormal; Notable for the following components:       Result Value    MCHC 30.8 (*)     MPV 9.3 (*)     Lymphocytes Absolute 0.7 (*)     Monocytes Absolute 0.2 (*)     All other components within normal limits   COMPREHENSIVE METABOLIC PANEL W/ REFLEX TO MG FOR LOW K - Abnormal; Notable for the following components:    Glucose 161 (*)     Total Bilirubin 0.2 (*)     All other components within normal limits   POCT GLUCOSE - Abnormal; Notable for the following components:    POC Glucose 258 (*)     All other components within normal limits   TROPONIN   BRAIN NATRIURETIC PEPTIDE   ANION GAP   OSMOLALITY   GLOMERULAR FILTRATION RATE, ESTIMATED   TROPONIN   TROPONIN   CBC WITH AUTO DIFFERENTIAL   BASIC METABOLIC PANEL   POCT GLUCOSE   POCT GLUCOSE       Radiologic studies results:  XR CHEST (2 VW)   Final Result   No interval change since previous study dated 12 April 2022. **This report has been created using voice recognition software. It may contain minor errors which are inherent in voice recognition technology. **      Final report electronically signed by DR Guevara Emerson on 5/13/2022 2:13 PM                ED COURSE   ED Medications administered this visit:   Medications   metoprolol succinate (TOPROL XL) extended release tablet 50 mg (50 mg Oral Given 5/13/22 2108)   citalopram (CELEXA) tablet 10 mg (10 mg Oral Given 5/13/22 2108)   insulin lispro (HUMALOG) injection vial 0-6 Units (has no administration in time range)   insulin lispro (HUMALOG) injection vial 0-3 Units (2 Units SubCUTAneous Given 5/13/22 2120)   traMADol (ULTRAM) tablet 50 mg (50 mg Oral Given 5/13/22 2108)   acetaminophen (TYLENOL) tablet 650 mg (has no administration in time range)   ceFAZolin (ANCEF) 2000 mg in sterile water 20 mL IV syringe (2,000 mg IntraVENous Given 5/13/22 2108)   aspirin EC tablet 325 mg (325 mg Oral Given 5/13/22 2108)   ondansetron (ZOFRAN) injection 4 mg (has no administration in time range)   enoxaparin (LOVENOX) injection 40 mg (has no administration in time range)   atorvastatin (LIPITOR) tablet 40 mg (40 mg Oral Given 5/13/22 2108)     Laboratory work-up shows normal troponin, other labs grossly unremarkable. EKG appears similar to EKG performed at  earlier today without any new ischemic changes or ST segment changes. Patient asymptomatic throughout stay in emergency department.     Discussed results of work-up with admitting physician,  Ilda, with plan to admit to stepdown unit for further cardiac monitoring and post-operative care. MEDICATION CHANGES     Current Discharge Medication List            FINAL DISPOSITION     Final diagnoses:   Chest pain, unspecified type   Acute electrocardiogram changes   S/P TKR (total knee replacement), left     Condition: condition: stable  Dispo: Admit to telemetry      This transcription was electronically signed. It was dictated by use of voice recognition software and electronically transcribed. The transcription may contain errors not detected in proofreading.         Veda Matute MD  05/13/22 [id number]

## 2022-05-13 NOTE — ED NOTES
ED to inpatient nurses report    Chief Complaint   Patient presents with    Other     EKG changes after sx today      Present to ED from home  LOC: alert and orientated to name, place, date  Vital signs   Vitals:    05/13/22 1214 05/13/22 1444   BP: 135/73 137/75   Pulse: 74 70   Resp: 18 18   Temp: 97.3 °F (36.3 °C)    TempSrc: Oral    SpO2: 98% 94%   Weight: 203 lb (92.1 kg)       Oxygen Baseline Room Air    Current needs required Room Air Bipap/Cpap No  LDAs:   Peripheral IV 05/13/22 Left Forearm (Active)   Site Assessment Clean, dry & intact 05/13/22 1445   Line Status Normal saline locked 05/13/22 1445   Phlebitis Assessment No symptoms 05/13/22 1445   Infiltration Assessment 0 05/13/22 1445     Mobility: Requires assistance * 2 r/t recent total knee replacement  Pending ED orders: NA  Present condition: Pt resting on cot in position of comfort. Pt is A&Ox4, resps easy and unlabored. IV shows no s/s of infection or infiltration. Pt continues with \"heaviness\" in L knee.   Person of contract, phone number   Our promise was given to patient    Electronically signed by Macy Saenz RN on 5/13/2022 at 4:36 PM       Macy Saenz RN  05/13/22 0086

## 2022-05-13 NOTE — PROGRESS NOTES
Echo cancelled per Dr. Wilmar Cortes. Patient had one 4-28-22 at [facility]

## 2022-05-13 NOTE — PROGRESS NOTES
Patient brought up on stretcher from ED. Patient pulled to floor bed. Patient is alert and oriented at this time. Daughter states that she does have intermittent confusion with her dementia. Patient is cooperative but hard of hearing. Two person skin assessment done with benjamin CANO - no findings. Patient does live at home with her daughter Matt Samuels.

## 2022-05-14 ENCOUNTER — APPOINTMENT (OUTPATIENT)
Dept: GENERAL RADIOLOGY | Age: 82
DRG: 315 | End: 2022-05-14
Payer: MEDICARE

## 2022-05-14 VITALS
TEMPERATURE: 98.2 F | OXYGEN SATURATION: 93 % | RESPIRATION RATE: 20 BRPM | BODY MASS INDEX: 34.84 KG/M2 | WEIGHT: 203 LBS | SYSTOLIC BLOOD PRESSURE: 123 MMHG | DIASTOLIC BLOOD PRESSURE: 65 MMHG | HEART RATE: 66 BPM

## 2022-05-14 LAB
ANION GAP SERPL CALCULATED.3IONS-SCNC: 10 MEQ/L (ref 8–16)
BASOPHILS # BLD: 0.2 %
BASOPHILS ABSOLUTE: 0 THOU/MM3 (ref 0–0.1)
BUN BLDV-MCNC: 24 MG/DL (ref 7–22)
CALCIUM SERPL-MCNC: 8.7 MG/DL (ref 8.5–10.5)
CHLORIDE BLD-SCNC: 102 MEQ/L (ref 98–111)
CO2: 26 MEQ/L (ref 23–33)
CREAT SERPL-MCNC: 0.8 MG/DL (ref 0.4–1.2)
EOSINOPHIL # BLD: 0 %
EOSINOPHILS ABSOLUTE: 0 THOU/MM3 (ref 0–0.4)
ERYTHROCYTE [DISTWIDTH] IN BLOOD BY AUTOMATED COUNT: 13.2 % (ref 11.5–14.5)
ERYTHROCYTE [DISTWIDTH] IN BLOOD BY AUTOMATED COUNT: 43.1 FL (ref 35–45)
GFR SERPL CREATININE-BSD FRML MDRD: 69 ML/MIN/1.73M2
GLUCOSE BLD-MCNC: 175 MG/DL (ref 70–108)
GLUCOSE BLD-MCNC: 206 MG/DL (ref 70–108)
GLUCOSE BLD-MCNC: 229 MG/DL (ref 70–108)
HCT VFR BLD CALC: 35.1 % (ref 37–47)
HEMOGLOBIN: 11 GM/DL (ref 12–16)
IMMATURE GRANS (ABS): 0.02 THOU/MM3 (ref 0–0.07)
IMMATURE GRANULOCYTES: 0.2 %
LYMPHOCYTES # BLD: 11.7 %
LYMPHOCYTES ABSOLUTE: 1.1 THOU/MM3 (ref 1–4.8)
MCH RBC QN AUTO: 28.2 PG (ref 26–33)
MCHC RBC AUTO-ENTMCNC: 31.3 GM/DL (ref 32.2–35.5)
MCV RBC AUTO: 90 FL (ref 81–99)
MONOCYTES # BLD: 11.6 %
MONOCYTES ABSOLUTE: 1.1 THOU/MM3 (ref 0.4–1.3)
NUCLEATED RED BLOOD CELLS: 0 /100 WBC
PLATELET # BLD: 191 THOU/MM3 (ref 130–400)
PMV BLD AUTO: 9.8 FL (ref 9.4–12.4)
POTASSIUM SERPL-SCNC: 4.9 MEQ/L (ref 3.5–5.2)
RBC # BLD: 3.9 MILL/MM3 (ref 4.2–5.4)
SEG NEUTROPHILS: 76.3 %
SEGMENTED NEUTROPHILS ABSOLUTE COUNT: 7.1 THOU/MM3 (ref 1.8–7.7)
SODIUM BLD-SCNC: 138 MEQ/L (ref 135–145)
WBC # BLD: 9.3 THOU/MM3 (ref 4.8–10.8)

## 2022-05-14 PROCEDURE — 6360000002 HC RX W HCPCS: Performed by: INTERNAL MEDICINE

## 2022-05-14 PROCEDURE — 99223 1ST HOSP IP/OBS HIGH 75: CPT | Performed by: INTERNAL MEDICINE

## 2022-05-14 PROCEDURE — 6360000002 HC RX W HCPCS: Performed by: PHYSICIAN ASSISTANT

## 2022-05-14 PROCEDURE — 93225 XTRNL ECG REC<48 HRS REC: CPT

## 2022-05-14 PROCEDURE — 73560 X-RAY EXAM OF KNEE 1 OR 2: CPT

## 2022-05-14 PROCEDURE — 97110 THERAPEUTIC EXERCISES: CPT

## 2022-05-14 PROCEDURE — 36415 COLL VENOUS BLD VENIPUNCTURE: CPT

## 2022-05-14 PROCEDURE — 85025 COMPLETE CBC W/AUTO DIFF WBC: CPT

## 2022-05-14 PROCEDURE — 97166 OT EVAL MOD COMPLEX 45 MIN: CPT

## 2022-05-14 PROCEDURE — 6370000000 HC RX 637 (ALT 250 FOR IP): Performed by: INTERNAL MEDICINE

## 2022-05-14 PROCEDURE — 82948 REAGENT STRIP/BLOOD GLUCOSE: CPT

## 2022-05-14 PROCEDURE — 93226 XTRNL ECG REC<48 HR SCAN A/R: CPT

## 2022-05-14 PROCEDURE — 93005 ELECTROCARDIOGRAM TRACING: CPT | Performed by: INTERNAL MEDICINE

## 2022-05-14 PROCEDURE — 97535 SELF CARE MNGMENT TRAINING: CPT

## 2022-05-14 PROCEDURE — 97161 PT EVAL LOW COMPLEX 20 MIN: CPT

## 2022-05-14 PROCEDURE — 80048 BASIC METABOLIC PNL TOTAL CA: CPT

## 2022-05-14 PROCEDURE — 4A12X45 MONITORING OF CARDIAC ELECTRICAL ACTIVITY, AMBULATORY, EXTERNAL APPROACH: ICD-10-PCS | Performed by: INTERNAL MEDICINE

## 2022-05-14 RX ORDER — TRAMADOL HYDROCHLORIDE 50 MG/1
50 TABLET ORAL EVERY 4 HOURS PRN
Qty: 42 TABLET | Refills: 0 | Status: SHIPPED | OUTPATIENT
Start: 2022-05-14 | End: 2022-05-21

## 2022-05-14 RX ORDER — ATORVASTATIN CALCIUM 40 MG/1
40 TABLET, FILM COATED ORAL NIGHTLY
Qty: 30 TABLET | Refills: 0 | Status: SHIPPED | OUTPATIENT
Start: 2022-05-14

## 2022-05-14 RX ORDER — TRAMADOL HYDROCHLORIDE 50 MG/1
50 TABLET ORAL EVERY 4 HOURS PRN
Qty: 28 TABLET | Refills: 0 | Status: SHIPPED | OUTPATIENT
Start: 2022-05-14 | End: 2022-05-14

## 2022-05-14 RX ORDER — METOPROLOL SUCCINATE 25 MG/1
25 TABLET, EXTENDED RELEASE ORAL DAILY
Qty: 90 TABLET | Refills: 3 | Status: SHIPPED | OUTPATIENT
Start: 2022-05-14

## 2022-05-14 RX ORDER — METOPROLOL SUCCINATE 25 MG/1
25 TABLET, EXTENDED RELEASE ORAL DAILY
Status: DISCONTINUED | OUTPATIENT
Start: 2022-05-15 | End: 2022-05-14 | Stop reason: HOSPADM

## 2022-05-14 RX ADMIN — TRAMADOL HYDROCHLORIDE 50 MG: 50 TABLET, COATED ORAL at 03:48

## 2022-05-14 RX ADMIN — TRAMADOL HYDROCHLORIDE 50 MG: 50 TABLET, COATED ORAL at 15:08

## 2022-05-14 RX ADMIN — ENOXAPARIN SODIUM 40 MG: 100 INJECTION SUBCUTANEOUS at 08:12

## 2022-05-14 RX ADMIN — INSULIN LISPRO 2 UNITS: 100 INJECTION, SOLUTION INTRAVENOUS; SUBCUTANEOUS at 13:16

## 2022-05-14 RX ADMIN — Medication 2000 MG: at 05:43

## 2022-05-14 RX ADMIN — CITALOPRAM 10 MG: 20 TABLET, FILM COATED ORAL at 08:12

## 2022-05-14 RX ADMIN — ASPIRIN 325 MG: 325 TABLET, COATED ORAL at 08:12

## 2022-05-14 RX ADMIN — INSULIN LISPRO 2 UNITS: 100 INJECTION, SOLUTION INTRAVENOUS; SUBCUTANEOUS at 08:12

## 2022-05-14 RX ADMIN — TRAMADOL HYDROCHLORIDE 50 MG: 50 TABLET, COATED ORAL at 09:35

## 2022-05-14 RX ADMIN — METOPROLOL SUCCINATE 50 MG: 50 TABLET, EXTENDED RELEASE ORAL at 08:12

## 2022-05-14 RX ADMIN — Medication 2000 MG: at 13:17

## 2022-05-14 ASSESSMENT — PAIN DESCRIPTION - ORIENTATION
ORIENTATION: LEFT

## 2022-05-14 ASSESSMENT — PAIN DESCRIPTION - LOCATION
LOCATION: KNEE

## 2022-05-14 ASSESSMENT — PAIN DESCRIPTION - DESCRIPTORS: DESCRIPTORS: ACHING

## 2022-05-14 ASSESSMENT — PAIN SCALES - GENERAL
PAINLEVEL_OUTOF10: 0
PAINLEVEL_OUTOF10: 2
PAINLEVEL_OUTOF10: 4

## 2022-05-14 NOTE — PROGRESS NOTES
Discharge paperwork discussed with the patients daughter whom she lives with and will be taking care of her. Prescriptions given to the patient/daughter. Care for her incision explained. Abds placed over the incision and columba hose placed. No further questions asked at this time.

## 2022-05-14 NOTE — PROGRESS NOTES
Juancarlos Pryor 60  INPATIENT OCCUPATIONAL THERAPY  [facility] CCU-STEPDOWN 3B  EVALUATION    Time:    Time In: 7311  Time Out: 1443  Timed Code Treatment Minutes: 10 Minutes  Minutes: 19          Date: 2022  Patient Name: Salma Muro,   Gender: female      MRN: [id number]  : 1940  (80 y.o.)  Referring Practitioner: Saskia Yip MD  Diagnosis: chest pain  Additional Pertinent Hx: 80-year-old man with past medical history of diabetes hypertension or family denying coronary disease but there is mention of angina in her prior records apparently underwent left total knee replacement at an outside facility. .  Patient had developed significant chest pain with associated drop in her heart rate in the 40s. She did have an EKG that showed significant bradycardia with T wave inversion in inferior leads. Blood pressure was also in the 90s. Patient was given fluids and we were contacted for further intervention. Since patient was symptomatic he had significant drop in blood heart rate dose for Deprol. Patient be seen by cardiology and plans was transferred here to [facility].  Patient repeat EKG per ER physician was unchanged with T wave changes noted in inferior leads. Patient was not bradycardic or hypotensive now. Patient could not recollect much of the events now. She denies having any chest pain at time of examination. She does drop her pulse ox and family did mention she has apnea but never been diagnosed with sleep apnea. Patient did have echo and stress test preop no reversible ischemia noted. Echo showed EF of 55% mild aortic stenosis with a valve area of 1.2 cm noted. I did speak with cardiology on-call.     Restrictions/Precautions:  Restrictions/Precautions: General Precautions,Fall Risk  Position Activity Restriction  Other position/activity restrictions: Lt TKA 2022    Subjective  Chart Reviewed: Yes,Orders,Progress Notes  Patient assessed for rehabilitation services?: Yes  Family / Caregiver Present: Yes    Subjective: RN okayed session. Pt had jsut finished PT upon arrival, pleasant and cooperative. Pain: C/o soreness throughout in L knee    Vitals: Vitals not assessed per clinical judgement, see nursing flowsheet    Social/Functional History:  Lives With: Daughter  Type of Home: House  Home Layout: One level  Home Access: Stairs to enter with rails  Entrance Stairs - Number of Steps: 3  Entrance Stairs - Rails: Right  Home Equipment: Telma Arias, rolling   Bathroom Toilet: Standard  Bathroom Equipment: Grab bars in [facility] chair,3-in-1 commode       ADL Assistance: Independent  Homemaking Assistance: Independent  Ambulation Assistance: Independent  Transfer Assistance: Independent    Active : No          VISION:WFL    HEARING:  Corrected - cochlear implant    COGNITION: Slow Processing, Decreased Recall, Decreased Insight, Impaired Memory, Decreased Problem Solving and Decreased Safety Awareness    RANGE OF MOTION:  Bilateral Upper Extremity:  WFL    STRENGTH:  Bilateral Upper Extremity:  grossly deconditioned    SENSATION:   WFL    ADL:   Lower Extremity Dressing: Stand By Assistance. for doffing/donning socks with increased time. BALANCE:  Sitting Balance:  Supervision. supported in chair  Standing Balance: [address] BED MOBILITY:  Not Tested    TRANSFERS:  Sit to Stand:  Contact Guard Assistance. Stand to Sit: Contact Guard Assistance. FUNCTIONAL MOBILITY:  Assistive Device: Rolling Walker  Assist Level:  Contact Guard Assistance. Distance: to/from hallway  Slow pace, steady with no LOB. Activity Tolerance:  Patient tolerance of  treatment: good. Assessment:  Assessment: Pt presented with the listed deficits s/p admission with chest pain and s/p TKA. Pt with decreased strength, balance, and endurance and currently requires CGA for t/fs and mobility.  Pt would benefit from continued OT to restore PLOF maximize pt's indep with ADLs and IADLs in prep for a safe return home at max level of indep. Performance deficits / Impairments: Decreased functional mobility ,Decreased strength,Decreased endurance,Decreased posture,Decreased ADL status,Decreased safe awareness,Decreased balance  Prognosis: Good  REQUIRES OT FOLLOW-UP: Yes  Decision Making: Medium Complexity    Treatment Initiated: Treatment and education initiated within context of evaluation. Evaluation time included review of current medical information, gathering information related to past medical, social and functional history, completion of standardized testing, formal and informal observation of tasks, assessment of data and development of plan of care and goals. Treatment time included skilled education and facilitation of tasks to increase safety and independence with ADL's for improved functional independence and quality of life. Discharge Recommendations:  Continue to assess pending progress,24 hour supervision or assist,Patient would benefit from continued therapy after discharge    Patient Education:     Patient Education  Education Given To: Patient,Family  Education Provided: Role of Therapy,Plan of Care,Precautions,IADL [employer] Recommendations:  Equipment Needed: No    Plan:  Times per Week: 6x  Times per Day: Daily  Current Treatment Recommendations: Strengthening,Balance training,Functional mobility training,Endurance training,Safety education & training,Patient/Caregiver education & training,Self-Care / ADL,Home management training. See long-term goal time frame for expected duration of plan of care. If no long-term goals established, a short length of stay is anticipated.     Goals:  Patient goals : return home  Short Term Goals  Time Frame for Short term goals: by discharge  Short Term Goal 1: Pt will complete functional mobility to/from bathroom with SBA and no safety cues to increase indep with ADLs  Short Term Goal 2: Pt will tolerate dynamic standing x5min with SBA and B hand release to increase indep with grooming  Short Term Goal 3: Pt will complete sit to stand t/fs with SBA and min VC for safety to increase indep with toileting         Following session, patient left in safe position with all fall risk precautions in place.

## 2022-05-14 NOTE — PLAN OF CARE
Problem: Safety - Adult  Goal: Free from fall injury  Outcome: Progressing  Flowsheets (Taken 5/14/2022 1111)  Free From Fall Injury: Instruct family/caregiver on patient safety  Note: Patient encouraged to use her walker due to her recent knee replacement. Problem: Skin/Tissue Integrity  Goal: Absence of new skin breakdown  Description: 1. Monitor for areas of redness and/or skin breakdown  2. Assess vascular access sites hourly  3. Every 4-6 hours minimum:  Change oxygen saturation probe site  4. Every 4-6 hours:  If on nasal continuous positive airway pressure, respiratory therapy assess nares and determine need for appliance change or resting period. Outcome: Progressing  Note: Patient is moving around and is walking with assistance. Problem: Pain  Goal: Verbalizes/displays adequate comfort level or baseline comfort level  Outcome: Progressing  Flowsheets (Taken 5/14/2022 1111)  Verbalizes/displays adequate comfort level or baseline comfort level:   Encourage patient to monitor pain and request assistance   Assess pain using appropriate pain scale  Note: Patient encouraged to express her pain. She is currently not in pain.       Problem: Discharge Planning  Goal: Discharge to home or other facility with appropriate resources  Flowsheets  Taken 5/14/2022 1111  Discharge to home or other facility with appropriate resources: Identify barriers to discharge with patient and caregiver  Taken 5/14/2022 0800  Discharge to home or other facility with appropriate resources: Identify discharge learning needs (meds, wound care, etc)

## 2022-05-14 NOTE — PROGRESS NOTES
Ilda contacted and relayed that PT was okay with her going. Daughter was comfortable taking her home so that she was in her own home. Patient stated consistently that she wanted to go home tonight. Ilda okay with her going.

## 2022-05-14 NOTE — CONSULTS
e    Patient - Robert Maldonado   MRN -  [id number]   Catia Lucio # - [de-identified]   - 1940      Date of Admission -  2022 12:06 PM  Date of evaluation -  2022  Room - 3B-36/036-A   Hospital Day - [address] [facility] Arvin Mitchell MD Primary Care Physician - Montez Mcclure MD   Chief Complaint   Evaluation for sleep apnea  Active Hospital Problem List      Active Hospital Problems    Diagnosis Date Noted    Chest pain [R07.9] 2022     Priority: Medium     HPI   Robert Maldonado is a 80 y.o. female admitted for chest pain  The patient has past medical history of diabetes mellitus type 2, hypertension, osteoarthritis, recent left total knee replacement who presented to the emergency department with chest pain. He was found to be bradycardic in the 40s. EKG showed bradycardia and right bundle branch block, left anterior fascicular block. Echo showed ejection fraction of 55% with mild aortic valve stenosis. Troponins were negative. The patient was bradycardic overnight however in normal sinus rhythm now. It was mentioned that the patient noticed to have apnea when she sleeps. The patient was evaluated by cardiology who decreased beta-blocker dose. Chest x-ray showed elevated right hemidiaphragm but no acute infiltrate and similar to the study that was done last month. The patient had an admission in 2022 secondary to COVID-19 pneumonia and she was discharged on 2022 on oxygen, she stayed on oxygen for around 1 month. It was noticed that the patient had bradycardia also at that time. She feels good now, she is on room air sitting on the chair. She is hard of hearing. She does snore at night, she stays with her daughter. The patient mentioned that she feels tired during the day and falls to sleep when she watch TV in the evening. She was observed that she stops breathing sometimes when she sleeps. Stop bang score is 6 which makes her high risk for sleep apnea.   She never had sleep study before. Past Medical History         Diagnosis Date    Arthritis     hands, knee    Breast cancer ([facility] [address]     Right Breast    CAD (coronary artery disease)     Cochlear implant in place 2009    Left    [place] (hard of hearing)     Severe    Hypertension     Persistent proteinuria associated with type 2 diabetes mellitus ([address]     Type II or unspecified type diabetes mellitus without mention of complication, not stated as uncontrolled     Varicose vein of leg 12/2016      Past Surgical History           Procedure Laterality Date    BREAST LUMPECTOMY  2005    Right    CHOLECYSTECTOMY      COCHLEAR IMPLANT Left     raven    COLONOSCOPY      GALLBLADDER SURGERY      HYSTERECTOMY      JOINT REPLACEMENT      AL TOTAL KNEE ARTHROPLASTY Right 5/1/2018    RIGHT KNEE TOTAL ARTHROPLASTY, LEFT KNEE INJECTION performed by Glenys Mcnally MD at [address] Bilateral 01/2017    [facility]    VASCULAR SURGERY       Diet    ADULT DIET; Regular; 5 carb choices (75 gm/meal); Low Fat/Low Chol/High Fiber/ARTIE  Allergies    Adhesive tape  Social History     Social History     Socioeconomic History    Marital status:       Spouse name: Not on file    Number of children: Not on file    Years of education: Not on file    Highest education level: Not on file   Occupational History    Not on file   Tobacco Use    Smoking status: Never Smoker    Smokeless tobacco: Never Used   Vaping Use    Vaping Use: Never used   Substance and Sexual Activity    Alcohol use: No    Drug use: No    Sexual activity: Not Currently   Other Topics Concern    Not on file   Social History Narrative    Not on file     Social Determinants of Health     Financial Resource Strain:     Difficulty of Paying Living Expenses: Not on file   Food Insecurity:     Worried About Running Out of Food in the Last Year: Not on file    [place] of Food in the Last Year: Not on file   Transportation Needs:     Lack of Transportation (Medical): Not on file    Lack of Transportation (Non-Medical): Not on file   Physical Activity:     Days of Exercise per Week: Not on file    Minutes of Exercise per Session: Not on file   Stress:     Feeling of Stress : Not on file   Social Connections:     Frequency of Communication with Friends and Family: Not on file    Frequency of Social Gatherings with Friends and Family: Not on file    Attends Jainism Services: Not on file    Active Member of [address] [place] [address] or Organizations: Not on file    Attends Club or Organization Meetings: Not on file    Marital Status: Not on file   Intimate Partner Violence:     Fear of Current or Ex-Partner: Not on file    Emotionally Abused: Not on file    Physically Abused: Not on file    Sexually Abused: Not on file   Housing Stability:     Unable to Pay for Housing in the Last Year: Not on file    Number of [place] in the Last Year: Not on file    Unstable Housing in the Last Year: Not on file     Family History          Problem Relation Age of Onset    Heart Disease Mother     High Blood Pressure Mother     High Blood Pressure Father        ROS    General/Constitutional: No recent loss of weight or appetite changes. No fever or chills.   Ophthalmic ROS: No blurred vision or eye pain  Hematological and Lymphatic ROS: No unusual bleeding  Respiratory ROS: Family did agree that she does have apnea when she sleeps but never been worked up for sleep apnea no cough no fever no shortness of breath  Cardiovascular ROS: Chest pain earlier in recovery with drop in heart rate  Gastrointestinal ROS: No abdomen pain no nausea vomiting  Musculoskeletal ROS: Knee pain  Neurological ROS: No confusion no numbness or tingling  Dermatological ROS: No skin rash  Meds    Current Medications    [START ON 5/15/2022] metoprolol succinate  25 mg Oral Daily    ceFAZolin  2,000 mg IntraVENous Q8H    citalopram  10 mg Oral Daily    insulin lispro  0-6 Units SubCUTAneous TID WC    insulin lispro  0-3 Units SubCUTAneous Nightly    traMADol  50 mg Oral Q6H    aspirin  325 mg Oral Daily    atorvastatin  40 mg Oral Nightly     acetaminophen, ondansetron  IV Drips/Infusions    Vitals    Vitals    weight is 203 lb (92.1 kg). Her oral temperature is 98 °F (36.7 °C). Her blood pressure is 134/82 and her pulse is 66. Her respiration is 18 and oxygen saturation is 96%. O2 Flow Rate (L/min): 2 L/min  I/O    Intake/Output Summary (Last 24 hours) at 5/14/2022 1130  Last data filed at 5/13/2022 2345  Gross per 24 hour   Intake 300 ml   Output --   Net 300 ml     Patient Vitals for the past 96 hrs (Last 3 readings):   Weight   05/13/22 1214 203 lb (92.1 kg)     Exam   Constitutional: Patient appears moderately built and moderately nourished. Head: Normocephalic and atraumatic. Mouth/Throat: Oropharynx is clear and moist.  No oral thrush. Eyes: Conjunctivae are normal. Pupils are equal, round, and reactive to light. No scleral icterus. Neck: Neck supple. No JVD or tracheal deviation present. Cardiovascular: Regular rate, regular rhythm, S1 and S2 with no murmur. No peripheral edema  Pulmonary/Chest: Normal effort with clear breath sounds. No stridor. No respiratory distress. Abdominal: Soft. Bowel sounds audible. No distension or tenderness to palp  Musculoskeletal: Moves all extremities  Lymphadenopathy:  No cervical adenopathy. Neurological: Patient is alert and oriented to person, place, and time. Skin: Skin is warm and dry.       Labs   ABG  No results found for: PH, PO2, PCO2, HCO3, O2SAT  No results found for: IFIO2, MODE, SETTIDVOL, SETPEEP  CBC  Recent Labs     05/13/22  1307 05/14/22  0323   WBC 7.6 9.3   RBC 4.40 3.90*   HGB 12.2 11.0*   HCT 39.6 35.1*   MCV 90.0 90.0   MCH 27.7 28.2   MCHC 30.8* 31.3*    191   MPV 9.3* 9.8      BMP  Recent Labs     05/13/22  1306 05/14/22  0323    138   K 4.3 4.9    102   CO2 26 26   BUN 19 24* CREATININE 0.6 0.8   GLUCOSE 161* 175*   CALCIUM 8.6 8.7     LFT  Recent Labs     05/13/22  1306   AST 23   ALT 11   BILITOT 0.2*   ALKPHOS 82     TROP  Lab Results   Component Value Date    TROPONINT < 0.010 05/13/2022    TROPONINT < 0.010 05/13/2022    TROPONINT < 0.010 05/13/2022     BNP  Lab Results   Component Value Date    PROBNP 754.8 05/13/2022    PROBNP 117.8 09/20/2019     D-Dimer  Lab Results   Component Value Date    DDIMER 5783.00 01/14/2022     Lactic Acid  No results for input(s): LACTA in the last 72 hours. INR  No results for input(s): INR, PROTIME in the last 72 hours. PTT  No results for input(s): APTT in the last 72 hours. Glucose  Recent Labs     05/13/22 2118 05/14/22  0805   POCGLU 258* 229*     UA No results for input(s): Akbar Bolognese, COLORU, CLARITYU, MUCUS, PROTEINU, BLOODU, RBCUA, WBCUA, BACTERIA, NITRU, GLUCOSEU, BILIRUBINUR, UROBILINOGEN, KETUA, LABCAST, LABCASTTY, AMORPHOS in the last 72 hours. Invalid input(s): CRYSTALS. Cultures    Procalcitonin  Lab Results   Component Value Date    PROCAL 0.14 01/14/2022       Radiology       XR CHEST (2 VW) [[id number]] Resulted: 05/13/22 1413     Order Status: Completed Specimen: Chest Updated: 05/13/22 1415     Narrative:       PROCEDURE: XR CHEST (2 VW)     CLINICAL INFORMATION: chest pain. COMPARISON: Chest x-ray dated 12 April 2022. TECHNIQUE: PA and lateral views the chest.     FINDINGS:         There is cardiomegaly. Juan José Roch is atherosclerotic calcification in the aortic arch. . There is an elevated right hemidiaphragm. There  are no pulmonary infiltrates or effusions.  The pulmonary vascularity is normal. There are postoperative changes along   the right chest wall. .     There is thoracic spondylosis.        Impression:       No interval change since previous study dated 12 April 2022. **This report has been created using voice recognition software.  It may contain minor errors which are inherent in voice recognition technology. **     Final report electronically signed by DR Jessica Richardson on 5/13/2022 2:13 PM             Assessment   Breathing related sleep disorder. Possible sleep apnea. Chest pain. Recent COVID-19 pneumonia in January 2021. Bradycardia. Hyperlipidemia. History of breast cancer status postlumpectomy surgery and radiation  Diabetes  Obesity  Recommendations   The patient is high risk to have sleep apnea with bang score of 6. CPAP as inpatient. She will need sleep study as outpatient. PFT as outpatient. Follow-up in the pulmonary clinic within 1 month. Thank you for the consult and allowing us to participate in the care of your patient. Case discussed with nurse and patient/family. Questions and concerns addressed. Meds and Orders reviewed.     Electronically signed by Maida Rivas MD on 5/14/2022 at 11:30 AM

## 2022-05-14 NOTE — PROCEDURES
The skin was prepped and a 48 hour holter monitor was applied. The patient was instructed on the documentation of symptoms and the purpose of the holter as well as the things to avoid while wearing the holter. The patient was instructed to remove and return the holter on 05/16/2022.   The serial number of the holter that was applied is [id number]

## 2022-05-14 NOTE — CONSULTS
[facility]  Cardiology Consult        Patient:  Salma Muro  YOB: 1940  MRN: [id number]     Acct: [de-identified]    PCP: Joan Park MD    Date of Admission: 5/13/2022      Reason for Consultation:  Chest pain, ? EKG changes      History Of Present Illness:    80 y.o. pleasant female who is not completely oriented with history of diabetes, hypertension who was transferred from [place] after undergoing left total knee replacement. Per records patient apparently had some chest pain post surgery and her electrocardiogram was showing heart rate of 40 bpm.  Her blood pressure was also apparently in the 76R systolic. She was given fluids and was sent to [address] for further evaluation. Patient follows up with Dr. Chidi Dobson at [facility] for cardiac care. She apparently had a preop stress test which showed no ischemia and an echocardiogram showing ejection fraction of 55% with mild aortic stenosis with a valve area of 1.2 cm². Her EKGs shows sinus rhythm with right bundle branch block, left anterior fascicular block. Her 3 sets of cardiac enzymes were negative essentially ruling out ACS. At present she denies any symptoms that are anginal or heart failure in nature. Cardiology was consulted for evaluation of chest pain. Telemetry shows sinus rhythm and bradycardia during night times. Currently her heart rate is in the 60s to 70 bpm      All labs, EKG's, diagnostic testing and images as well as cardiac cath, stress testing were reviewed during this encounter.     Past Medical History:          Diagnosis Date    Arthritis     hands, knee    Breast cancer ([address]     Right Breast    CAD (coronary artery disease)     Cochlear implant in place 2009    Left    [place] (hard of hearing)     Severe    Hypertension     Persistent proteinuria associated with type 2 diabetes mellitus (HCC)     Type II or unspecified type diabetes mellitus without mention of complication, not stated as uncontrolled     Varicose vein of leg 12/2016       Past Surgical History:          Procedure Laterality Date    BREAST LUMPECTOMY  2005    Right    CHOLECYSTECTOMY      COCHLEAR IMPLANT Left     raven    COLONOSCOPY      GALLBLADDER SURGERY      HYSTERECTOMY      JOINT REPLACEMENT      ID TOTAL KNEE ARTHROPLASTY Right 5/1/2018    RIGHT KNEE TOTAL ARTHROPLASTY, LEFT KNEE INJECTION performed by Blaine [facility]barran [facility]ilateral 01/2017    [facility]    VASCULAR SURGERY         Medications Prior to Admission:      Prior to Admission medications    Medication Sig Start Date End Date Taking? Authorizing Provider   vitamin C (ASCORBIC ACID) 500 MG tablet Take 1,000 mg by mouth daily   Yes Historical Provider, MD   Cholecalciferol (VITAMIN D) 50 MCG (2000 UT) CAPS capsule Take by mouth   Yes Historical Provider, MD   zinc gluconate 50 MG tablet Take 50 mg by mouth daily   Yes Historical Provider, MD   citalopram (CELEXA) 10 MG tablet Take 10 mg by mouth daily    Historical Provider, MD   Lancets MISC 1 each by Does not apply route daily Dx: E11.9, uses deliza lancets, check bs daily. 6/30/20   Carley Heart MD   blood glucose test strips (ASCENSIA AUTODISC VI;ONE TOUCH ULTRA TEST VI) strip 1 each by In Vitro route daily E11.9, check BS daily, uses One Touch Ultra II strips 6/30/20   Carley Heart MD   benzonatate (TESSALON) 200 MG capsule Take 1 capsule by mouth 3 times daily as needed for Cough  Patient not taking: Reported on 5/13/2022 3/17/20   Carley Heart MD   Blood Glucose Monitoring Suppl KIT Check BS daily, Dx E11.9, Uses one touch ultra machine 2/21/20   Carley Heart MD   metFORMIN (GLUCOPHAGE) 500 MG tablet Take two tablets twice daily.  6/28/19   Carley [facility] MD   metoprolol succinate (TOPROL XL) 25 MG extended release tablet Take 1 tablet by mouth daily  Patient taking differently: Take 50 mg by mouth daily 6/28/19   Gigi Rodriguez MD   aspirin 81 MG EC tablet Take 1 tablet by mouth daily Restart after lovenox done  Patient taking differently: Take 81 mg by mouth nightly Restart after lovenox done 6/28/19   Gigi Rodriguez MD       Current Facility-Administered Medications   Medication Dose Route Frequency Provider Last Rate Last Admin    metoprolol succinate (TOPROL XL) extended release tablet 50 mg  50 mg Oral Daily Ulises Prieto MD   50 mg at 05/14/22 0812    citalopram (CELEXA) tablet 10 mg  10 mg Oral Daily Ulises Prieto MD   10 mg at 05/14/22 0812    insulin lispro (HUMALOG) injection vial 0-6 Units  0-6 Units SubCUTAneous TID WC Ulises Prieto MD   2 Units at 05/14/22 0812    insulin lispro (HUMALOG) injection vial 0-3 Units  0-3 Units SubCUTAneous Nightly Ulises Prieto MD   2 Units at 05/13/22 2120    traMADol (ULTRAM) tablet 50 mg  50 mg Oral Q6H Ulises Prieto MD   50 mg at 05/14/22 0348    acetaminophen (TYLENOL) tablet 650 mg  650 mg Oral Q6H PRN Ulises Prieto MD        aspirin EC tablet 325 mg  325 mg Oral Daily Ulises Prieto MD   325 mg at 05/14/22 0812    ondansetron (ZOFRAN) injection 4 mg  4 mg IntraVENous Q6H PRN Ulises Prieto MD        enoxaparin (LOVENOX) injection 40 mg  40 mg SubCUTAneous Daily Ulises Prieto MD   40 mg at 05/14/22 8609    atorvastatin (LIPITOR) tablet 40 mg  40 mg Oral Nightly Ulises Prieto MD   40 mg at 05/13/22 2108       Allergies:  Adhesive tape    Social History:    TOBACCO:   reports that she has never smoked. She has never used smokeless tobacco.  ETOH:   reports no history of alcohol use.       Family History:        Problem Relation Age of Onset    Heart Disease Mother     High Blood Pressure Mother     High Blood Pressure Father          Review of Systems -   General ROS: negative  Psychological ROS: negative  Hematological and Lymphatic ROS: No history of blood clots or bleeding disorder. Respiratory ROS: no cough, shortness of breath, or wheezing  Cardiovascular ROS: As per HPI  Gastrointestinal ROS: negative  Genito-Urinary ROS: no dysuria, trouble voiding, or hematuria  Musculoskeletal ROS: negative  Neurological ROS: no TIA or stroke symptoms  Dermatological ROS: negative    All others reviewed and are negative.        /82   Pulse 66   Temp 98 °F (36.7 °C) (Oral)   Resp 18   Wt 203 lb (92.1 kg)   LMP  (LMP Unknown)   SpO2 96%   BMI 34.84 kg/m²       Physical Examination:   General appearance - alert, in no distress  Mental status - alert, oriented to person, place, but not time  Neck - supple, no significant adenopathy, no JVD, or carotid bruits  Chest - clear to auscultation, no wheezes, rales or rhonchi, symmetric air entry  Heart - normal rate, regular rhythm, normal S1, S2, grade 2 systolic ejection murmur  Abdomen - soft, nontender, nondistended, no masses or organomegaly  Neurological - alert, oriented, normal speech, no focal findings or movement disorder noted  Musculoskeletal - no joint tenderness, left knee with dressing intact  Extremities - peripheral pulses normal, no pedal edema, no clubbing or cyanosis  Skin - normal coloration and turgor, no rashes, no suspicious skin lesions noted      LABS:    Recent Labs     05/13/22  1306 05/13/22  1920 05/13/22  2255   TROPONINT < 0.010 < 0.010 < 0.010     CBC:   Lab Results   Component Value Date    WBC 9.3 05/14/2022    RBC 3.90 05/14/2022    HGB 11.0 05/14/2022    HCT 35.1 05/14/2022    MCV 90.0 05/14/2022    MCH 28.2 05/14/2022    MCHC 31.3 05/14/2022    RDW 14.4 05/03/2018     05/14/2022    MPV 9.8 05/14/2022     BMP:    Lab Results   Component Value Date     05/14/2022    K 4.9 05/14/2022    K 4.3 05/13/2022     05/14/2022    CO2 26 05/14/2022    BUN 24 05/14/2022    LABALBU 3.9 05/13/2022    LABALBU 4.7 02/15/2012    CREATININE 0.8 05/14/2022    CALCIUM 8.7 05/14/2022    LABGLOM 69 05/14/2022    GLUCOSE 175 05/14/2022    GLUCOSE 174 02/16/2017     Hepatic Function Panel:    Lab Results   Component Value Date    ALKPHOS 82 05/13/2022    ALT 11 05/13/2022    AST 23 05/13/2022    PROT 6.4 05/13/2022    BILITOT 0.2 05/13/2022    BILIDIR <0.2 07/07/2014    LABALBU 3.9 05/13/2022    LABALBU 4.7 02/15/2012     Magnesium:    Lab Results   Component Value Date    MG 1.7 09/21/2018     Warfarin PT/INR:  No components found for: PTPATWAR, PTINRWAR  HgBA1c:    Lab Results   Component Value Date    LABA1C 6.6 04/12/2022     FLP:    Lab Results   Component Value Date    TRIG 126 06/28/2019    HDL 67 06/28/2019    LDLCALC 106 06/28/2019    LABVLDL 26 03/16/2015     TSH:    Lab Results   Component Value Date    TSH 2.042 03/31/2012     BNP: No components found for: PRO-BNP      Assessment/Plan:    Patient Active Problem List   Diagnosis    CAD (coronary artery disease)    Hard of hearing, Severe    Pure hypercholesterolemia    Obesity, Class III, BMI 40-49.9 (morbid obesity) ([facility] [address]    Insomnia    Morbid obesity with BMI of 40.0-44.9, adult (HCC)    Obesity (BMI 30-39. 9)    Type 2 diabetes mellitus without complication (HCC)    Benign essential HTN    Primary osteoarthritis of right knee    Osteoarthritis, localized, knee    S/P TKR (total knee replacement) using cement, right    Anxiety    Sensorineural hearing loss, bilateral    Acute hypoxemic respiratory failure due to COVID-19 [facility])    Chest pain     Briefly this is a 80-year-old female who is not completely oriented with history of diabetes, hypertension who presents after undergoing left knee replacement from [place] for possible chest pain. Serial enzymes were negative for ACS. Preprocedure cardiac work-up showed ejection fraction of 55% with mild AS and no reversible ischemia on stress test.  At present she denies any symptoms she is alert oriented x2/name and place only.     Telemetry  No current cardiac symptoms  Serial enzymes negative for ACS  Continue aspirin statin  Will decrease beta-blocker dose to 25 of Toprol  Possible episode due to vagal reaction versus side effect from anesthetic agents  Monitor blood pressure and heart rate  Continue rest of management  Follow-up with Dr. Amairani Shea at [facility] as outpatient    Please do note hesitate to contact me for any further questions. Thank you for the opportunity to be involved in this patient's care.     Code Status: Prior    Electronically signed by Lane Crooks MD on 5/14/2022 at 9:31 AM

## 2022-05-14 NOTE — PROGRESS NOTES
[address] [address]  INPATIENT PHYSICAL THERAPY  EVALUATION  STRZ CCU-STEPDOWN 3B - 3B-36/036-A    Time In: 8143  Time Out: 1423  Timed Code Treatment Minutes: 18 Minutes  Minutes: 27          Date: 2022  Patient Name: Alex Rosario,  Gender:  female        MRN: [id number]  : 1940  (80 y.o.)      Referring Practitioner: Dr. Marie Quarles  Diagnosis: Chest Pain  Additional Pertinent Hx: Pt transferred from [address] [place] [address] s/p LT TKR 22 due to c/o chest pain. Hx:  Dementia     Restrictions/Precautions:  Restrictions/Precautions: General Precautions,Fall Risk (decreased cognition(Alzheimer's))  Position Activity Restriction  Other position/activity restrictions: Lt TKA 2022    Subjective:  Chart Reviewed: Yes  Patient assessed for rehabilitation services?: Yes  Family / Caregiver Present: Yes (daughter(caregiver))  Subjective: Nurse approved session. Pt seated in recliner. Daughter present throughout session    General:        Vision  Vision: Within Functional Limits  Hearing  H[facility]: [facility]  Hearing Exceptions: Hard of hearing/hearing concerns  Hearing: Exceptions to [facility]  Hearing Exceptions: Hard of hearing/hearing concerns       Pain: 0/10: at rest, Pt c/o Lt knee pain with standing and moving.       Vitals: Vitals not assessed per clinical judgement, see nursing flowsheet    Social/Functional History:    Lives With: Daughter  Type of Home: House  Home Layout: One level  Home Access: Stairs to enter with rails  Entrance Stairs - Number of Steps: 3  Entrance Stairs - Rails: Right (and wall on LT)  Home Equipment: Walker, rolling                   Ambulation Assistance: Independent  Transfer Assistance: Independent    Active : No          OBJECTIVE:  Range of Motion:  Right Lower Extremity: WFL  Left Lower Extremity: knee flexion 90 degrees, ext lacking approx 5 degrees to neutral.  Others WFL    Strength:  Right Lower Extremity: WFL  Left Lower Extremity: knee ext 3-, able to demonstrate a fair quad set with assist, hip and ankle WFL    Balance:  Static Sitting Balance:  Supervision  Dynamic Sitting Balance: Contact Guard Assistance, with RW     Bed Mobility:  Supine to Sit: Minimal Assistance, to elevate trunk, fully clear LLE. Bed flat, no rail. Sit to Supine: Stand By Assistance     Transfers:  Sit to Stand: [employer], cues for hand placement  Stand to [address] [address], cues for hand placement  Stand Pivot:Contact [employer], with RW    Ambulation:  Contact Guard Assistance  Distance: 15 ft  Surface: Level Tile  Device:Rolling Walker  Gait Deviations:  Slow Olga, Decreased Weight Shift Left and Decreased Gait Speed    Exercise:  Exercises were completed for increased independence with functional mobility. Passive then assisted Lt ankle df x10 reps, assisted long arc quads with pt holding ext at end range x8 reps, seated assisted LT knee flexion with pt planting foot on floor and scooting hips forward to gain 90 degrees knee flexion. Functional Outcome Measures: Not completed  -PAC Inpatient Mobility Raw Score : 18  AM-PAC Inpatient T-Scale Score : 43.63    ASSESSMENT:  Activity Tolerance:  Patient tolerance of  treatment: good. Treatment Initiated: Treatment and education initiated within context of evaluation. Evaluation time included review of current medical information, gathering information related to past medical, social and functional history, completion of standardized testing, formal and informal observation of tasks, assessment of data and development of plan of care and goals. Treatment time included skilled education and facilitation of tasks to increase safety and independence with functional mobility for improved independence and quality of life. Assessment:   Body Structures, Functions, Activity Limitations Requiring Skilled Therapeutic Intervention: Decreased functional mobility ,Decreased strength,Decreased ROM,Decreased cognition,Decreased endurance,Decreased safe awareness  Assessment: Pt demonstrates a decline in baseline by way of bed mobility, transfers, gait and steps due to Lt TKA (5/13/2022) and prior hx:  Alzheimer's. Pt is showing decreased range of motion Lt knee, decreased strength, poor awareness of safe technique, decreased endurance and balance. Pt is disoriented as to situation, place, time, but was able to identify her daughter. Pt will benefit from continued skilled PT to address stair negotiation and car transfer prior to home. Therapy Prognosis: Fair    Requires PT Follow-Up: Yes    Discharge Recommendations:  Discharge Recommendations: Patient would benefit from continued therapy after discharge,24 hour supervision or assist    Patient Education:  Education  Education Given To: Patient,Family  Education Provided: Role of Lien WHITTINGTON/ Oj Nance. [facility] Provided Comments: reviewed stair negotiation with +2 assist and car transfer, simulated with daughter. Education Method: Demonstration,Verbal  Barriers to Learning: Cognition (No barriers from daughter.)   . Equipment Recommendations:  Equipment Needed: No    Plan:  Plan:  (1x prior to discharge)    Goals:  Patient goals : I want to go home. Short Term Goals  Time Frame for Short term goals: until discharge  Short term goal 1: Pt to ascend/descend 4 steps with Rt rail, min +1 for home entry  Short term goal 2: Pt to get in/out of car, min +1 for transportation needs. Short term goal 3: Pt to walk with RW >= 50 ft, CGA for home mobility  Long Term Goals  Time Frame for Long term goals : NA due to expected short LOS    Following session, patient left in safe position with all fall risk precautions in place.

## 2022-05-14 NOTE — PROGRESS NOTES
input(s): INR in the last 72 hours. Radiology    Objective:   Vitals: /70   Pulse 75   Temp 98.1 °F (36.7 °C) (Oral)   Resp 18   Wt 203 lb (92.1 kg)   LMP  (LMP Unknown)   SpO2 95%   BMI 34.84 kg/m²   HEENT: Head:pupils react  Neck: supple  Lungs: clear to auscultation  Heart: regular rate and rhythm   Abdomen: soft BS heard   Extremities: warm  Dressing Lt knee  Neurologic:  Awake,  oriented  To person and place    Impression:   :   Chest pain with bradycardia and hypotension during post op neg for MI  Status post left total knee replacement 5/13  Hypotension resolved  Hyperlipidemia  History of breast cancer status postlumpectomy surgery and radiation  Diabetes  Obesity  Concern for sleep apnea  ?  Dementia    Plan:    Cont antiplatelets, statins  And B Blockers   Monitor chems   ECHO noted   Cardio consulted   Ortho to address her post op Lt knee  PT OT        Jerrod Coleman MD, MD

## 2022-05-14 NOTE — PROGRESS NOTES
Progress Note    Subjective:     Post-Operative Day: 1 Status Post left Total Knee Arthroplasty  Systemic or Specific Complaints:No Complaints    Objective:   VITALS:  /82   Pulse 66   Temp 98 °F (36.7 °C) (Oral)   Resp 18   Wt 203 lb (92.1 kg)   LMP  (LMP Unknown)   SpO2 96%   BMI 34.84 kg/m²        General: alert, appears stated age and cooperative   Wound: Wound clean and dry no evidence of infection. DVT Exam: No evidence of DVT seen on physical exam.   Abdomen soft and non tender, non distended    Knee swollen but thigh soft to palpation. Moving foot and ankle. Good distal pulses. Data Review  CBC:   Lab Results   Component Value Date    WBC 9.3 05/14/2022    RBC 3.90 05/14/2022    HGB 11.0 05/14/2022    HCT 35.1 05/14/2022     05/14/2022    INR 0.95 10/24/2020     BMP:   Lab Results   Component Value Date     05/14/2022    K 4.9 05/14/2022    K 4.3 05/13/2022     05/14/2022    CO2 26 05/14/2022    BUN 24 05/14/2022    CREATININE 0.8 05/14/2022    GLUCOSE 175 05/14/2022    GLUCOSE 174 02/16/2017       Assessment:     Status Post left Total Knee Arthroplasty. Doing well postoperatively. Plan:      1:  Continue Total Knee post-op protocol.    2:  Continue Deep venous thrombosis prophylaxis  3:  Continue physical therapy  4:  Continue Pain Control  5:  Monitor labs  6:  Discharge Pending home today if she does well with PT  Stop lovenox, continue  mg  Johan hose both legs  Ancef dose  PT to see today, [facility]    [employer] [facility] in collaboration with Dr. Lila Skinner

## 2022-05-15 LAB
EKG ATRIAL RATE: 71 BPM
EKG P AXIS: 34 DEGREES
EKG P-R INTERVAL: 192 MS
EKG Q-T INTERVAL: 440 MS
EKG QRS DURATION: 152 MS
EKG QTC CALCULATION (BAZETT): 478 MS
EKG R AXIS: -61 DEGREES
EKG T AXIS: -11 DEGREES
EKG VENTRICULAR RATE: 71 BPM

## 2022-05-15 PROCEDURE — 93010 ELECTROCARDIOGRAM REPORT: CPT | Performed by: NUCLEAR MEDICINE

## 2022-05-15 NOTE — DISCHARGE SUMMARY
Discharge Summary    Date:5/15/2022        Patient Name:Cate Keenan     YOB: 1940     Age:81 y.o. Admit Date:5/13/2022   Admission Condition:fair   Discharged Condition:stable  Discharge Date: 05/14/2022    Discharge Diagnoses   Chest pain with bradycardia and hypotension during post op neg for MI  Status post left total knee replacement 5/13  Hypotension resolved  Hyperlipidemia  History of breast cancer status postlumpectomy surgery and radiation  Diabetes  Obesity  Concern for sleep apnea  ? Dementia    Hospital Stay   Narrative of Hospital Course:   55-year-old man with past medical history of diabetes hypertension or family denying coronary disease but there is mention of angina in her prior records apparently underwent left total knee replacement at an outside facility. Patient had developed significant chest pain with associated drop in her heart rate in the 40s. She did have an EKG that showed significant bradycardia with T wave inversion in inferior leads. Blood pressure was also in the 90s. Patient was given fluids and transferred here to [facility] for further care. Cardiology was also contacted. Serial cardiac enzymes were checked and MI was ruled out. Patient had no further chest pain. No further arrhythmia noted. Patient did have increased confusion over the night which we suspect is probably from her dementia. Family also mentioned she does have apneic spells and hence had pulmonary also consulted for possible sleep apnea. Patient was able to ambulate with therapy. Cardiology recommended decreasing her Toprol dose and follow-up with her cardiologist as outpatient. Patient condition was overall stable with no further chest pain or hypotension or bradycardia. She will be discharged with Holter monitor.     Consultants:   IP CONSULT TO CARDIOLOGY  IP CONSULT TO ORTHOPEDIC SURGERY  IP CONSULT TO PULMONOLOGY    Time Spent on Discharge:  35 minutes were spent in patient examination, evaluation, counseling as well as medication reconciliation, prescriptions for required medications, discharge plan and follow up.       Surgeries/Procedures Performed:   Status post left total knee replacement 5/13 done at [facility]          Significant Diagnostic Studies:   Recent Labs:  CBC:   Lab Results   Component Value Date    WBC 9.3 05/14/2022    RBC 3.90 05/14/2022    HGB 11.0 05/14/2022    HCT 35.1 05/14/2022    MCV 90.0 05/14/2022    MCH 28.2 05/14/2022    MCHC 31.3 05/14/2022    RDW 14.4 05/03/2018     05/14/2022     BMP:    Lab Results   Component Value Date    GLUCOSE 175 05/14/2022    GLUCOSE 174 02/16/2017     05/14/2022    K 4.9 05/14/2022    K 4.3 05/13/2022     05/14/2022    CO2 26 05/14/2022    ANIONGAP 10.0 05/14/2022    BUN 24 05/14/2022    CREATININE 0.8 05/14/2022    CALCIUM 8.7 05/14/2022    LABGLOM 69 05/14/2022     HFP:    Lab Results   Component Value Date    PROT 6.4 05/13/2022     CMP:    Lab Results   Component Value Date    GLUCOSE 175 05/14/2022    GLUCOSE 174 02/16/2017     05/14/2022    K 4.9 05/14/2022    K 4.3 05/13/2022     05/14/2022    CO2 26 05/14/2022    BUN 24 05/14/2022    CREATININE 0.8 05/14/2022    ANIONGAP 10.0 05/14/2022    ALKPHOS 82 05/13/2022    ALT 11 05/13/2022    AST 23 05/13/2022    BILITOT 0.2 05/13/2022    LABALBU 3.9 05/13/2022    LABALBU 4.7 02/15/2012    LABGLOM 69 05/14/2022    PROT 6.4 05/13/2022    CALCIUM 8.7 05/14/2022     PT/INR:    Lab Results   Component Value Date    INR 0.95 10/24/2020     PTT:   Lab Results   Component Value Date    APTT 27.1 10/24/2020     FLP:    Lab Results   Component Value Date    CHOL 198 06/28/2019    TRIG 126 06/28/2019    HDL 67 06/28/2019     U/A:    Lab Results   Component Value Date    COLORU YELLOW 04/12/2022    SPECGRAV 1.016 04/30/2018    PHUR 5.0 04/12/2022    PROTEINU NEGATIVE 04/12/2022    GLUCOSEU NEGATIVE 04/12/2022    KETUA NEGATIVE 04/12/2022    BILIRUBINUR NEGATIVE 04/12/2022    UROBILINOGEN 0.2 04/12/2022    NITRU NEGATIVE 04/12/2022    LEUKOCYTESUR TRACE 04/12/2022     TSH:    Lab Results   Component Value Date    TSH 2.042 03/31/2012       Radiology Last 7 Days:  XR CHEST (2 VW)    Result Date: 5/13/2022  No interval change since previous study dated 12 April 2022. **This report has been created using voice recognition software. It may contain minor errors which are inherent in voice recognition technology. ** Final report electronically signed by DR Gadiel Lozada on 5/13/2022 2:13 PM    XR KNEE LEFT (1-2 VIEWS)    Result Date: 5/14/2022  1. Large joint effusion. 2. Total left knee arthroplasty without significant periprosthetic lucency. **This report has been created using voice recognition software. It may contain minor errors which are inherent in voice recognition technology. ** Final report electronically signed by Dr Belén Shah on 5/14/2022 12:40 PM      Discharge Plan   Disposition: Home    Provider Follow-Up:   MD Bhavna Moy [address] [place] [id number]  [phone]      Please call the office and make a follow up appointment     Patient to follow-up with sleep lab cardiology Ortho and PCP      Patient Instructions   Diet: cardiac diet    Activity: activity as tolerated per ortho recommendations        Discharge Medications         Medication List      START taking these medications    atorvastatin 40 MG tablet  Commonly known as: LIPITOR  Take 1 tablet by mouth nightly     traMADol 50 MG tablet  Commonly known as: ULTRAM  Take 1 tablet by mouth every 4 hours as needed for Pain for up to 7 days.         CHANGE how you take these medications    aspirin 325 MG EC tablet  Take 1 tablet by mouth daily  What changed:   · medication strength  · how much to take  · additional instructions     metoprolol succinate 25 MG extended release tablet  Commonly known as: TOPROL XL  Take 1 tablet by mouth daily  What changed: how much to take        CONTINUE taking these medications    benzonatate 200 MG capsule  Commonly known as: TESSALON  Take 1 capsule by mouth 3 times daily as needed for Cough     Blood Glucose Monitoring Suppl Kit  Check BS daily, Dx E11.9, Uses one touch ultra machine     blood glucose test strips strip  Commonly known as: ASCENSIA AUTODISC VI;ONE TOUCH ULTRA TEST VI  1 each by In Vitro route daily E11.9, check BS daily, uses One Touch Ultra II strips     citalopram 10 MG tablet  Commonly known as: CELEXA     Lancets Misc  1 each by Does not apply route daily Dx: E11.9, uses deliza lancets, check bs daily. metFORMIN 500 MG tablet  Commonly known as: GLUCOPHAGE  Take two tablets twice daily.      vitamin C 500 MG tablet  Commonly known as: ASCORBIC ACID     vitamin D 50 MCG (2000 UT) Caps capsule     zinc gluconate 50 MG tablet           Where to Get Your Medications      These medications were sent to [facility]/pharmacy #[id number]- [place], [place] - [address] [facility] [phone]  [address] [place] [address]    Phone: [phone]   · atorvastatin 40 MG tablet     You can get these medications from any pharmacy    Bring a paper prescription for each of these medications  · aspirin 325 MG EC tablet  · metoprolol succinate 25 MG extended release tablet  · traMADol 50 MG tablet         Electronically signed by Pillo Ritchie MD on 5/15/22 at 6:11 AM EDT

## 2022-05-22 LAB
ACQUISITION DURATION: NORMAL S
AVERAGE HEART RATE: 86 BPM
HOOKUP DATE: NORMAL
HOOKUP TIME: NORMAL
MAX HEART RATE TIME/DATE: NORMAL
MAX HEART RATE: 127 BPM
MIN HEART RATE TIME/DATE: NORMAL
MIN HEART RATE: 47 BPM
NUMBER OF QRS COMPLEXES: NORMAL
NUMBER OF SUPRAVENTRICULAR COUPLETS: 2
NUMBER OF SUPRAVENTRICULAR ECTOPICS: 25
NUMBER OF SUPRAVENTRICULAR ISOLATED BEATS: 19
NUMBER OF VENTRICULAR BIGEMINAL CYCLES: 0
NUMBER OF VENTRICULAR COUPLETS: 0
NUMBER OF VENTRICULAR ECTOPICS: 9

## 2023-09-19 ENCOUNTER — HOSPITAL ENCOUNTER (OUTPATIENT)
Dept: WOMENS IMAGING | Age: 83
Discharge: HOME | End: 2023-09-19
Payer: MEDICARE

## 2023-09-19 VITALS — HEIGHT: 64 IN | WEIGHT: 200 LBS | BODY MASS INDEX: 34.15 KG/M2

## 2023-09-19 DIAGNOSIS — Z12.31 VISIT FOR SCREENING MAMMOGRAM: ICD-10-CM

## 2023-09-19 PROCEDURE — 77063 BREAST TOMOSYNTHESIS BI: CPT

## 2025-01-11 PROBLEM — R07.9 CHEST PAIN: Status: RESOLVED | Noted: 2022-05-13 | Resolved: 2025-01-11

## 2025-01-11 PROBLEM — M17.10 OSTEOARTHRITIS, LOCALIZED, KNEE: Status: RESOLVED | Noted: 2018-05-01 | Resolved: 2025-01-11

## 2025-01-11 PROBLEM — M17.11 PRIMARY OSTEOARTHRITIS OF RIGHT KNEE: Status: RESOLVED | Noted: 2018-05-01 | Resolved: 2025-01-11

## 2025-01-11 PROBLEM — J96.01 ACUTE HYPOXEMIC RESPIRATORY FAILURE DUE TO COVID-19: Status: RESOLVED | Noted: 2022-01-14 | Resolved: 2025-01-11

## 2025-01-11 PROBLEM — F41.9 ANXIETY: Status: RESOLVED | Noted: 2019-06-28 | Resolved: 2025-01-11

## 2025-01-11 PROBLEM — U07.1 ACUTE HYPOXEMIC RESPIRATORY FAILURE DUE TO COVID-19: Status: RESOLVED | Noted: 2022-01-14 | Resolved: 2025-01-11

## 2025-01-30 LAB
BACTERIA, URINE: ABNORMAL /HPF
BILIRUBIN, URINE: NEGATIVE MG/DL
CLARITY, UA: ABNORMAL
COLOR, UA: YELLOW
COMMENT: ABNORMAL
EPITHELIAL CELLS, UA: ABNORMAL /HPF (ref 0–5)
ERYTHROCYTES URINE: ABNORMAL /HPF (ref 0–2)
GLUCOSE URINE: NEGATIVE MG/DL
KETONES, URINE: NEGATIVE MG/DL
LEUKOCYTE ESTERASE, URINE: ABNORMAL
LEUKOCYTES, UA: ABNORMAL /HPF (ref 0–5)
MUCUS: PRESENT
NITRITE, URINE: NEGATIVE
PH, URINE: 5.5 (ref 4.5–8)
PROTEIN, URINE: 10 MG/DL
SPECIFIC GRAVITY UA: 1.03 (ref 1–1.03)
URIC ACID CRYSTALS: ABNORMAL /HPF
URINE HGB: NEGATIVE MG/DL
UROBILINOGEN, URINE: <2 MG/DL (ref 0–2)

## 2025-03-07 LAB
COMMENT: NORMAL
HB: SOURCE: NORMAL
INFLUENZA VIRUS A RNA: NOT DETECTED
INFLUENZA VIRUS B RNA: NOT DETECTED
RESPIRATORY SYNCYTIAL VIRUS: NOT DETECTED
SARS-COV-2: NOT DETECTED

## 2025-03-26 LAB
BACTERIA, URINE: ABNORMAL /HPF
BILIRUBIN, URINE: NEGATIVE MG/DL
CLARITY, UA: CLEAR
COLOR, UA: YELLOW
EPITHELIAL CELLS, UA: ABNORMAL /HPF (ref 0–5)
EPITHELIAL CELLS, UA: ABNORMAL /HPF (ref 0–5)
ERYTHROCYTES URINE: ABNORMAL /HPF (ref 0–2)
GLUCOSE URINE: NEGATIVE MG/DL
KETONES, URINE: NEGATIVE MG/DL
LEUKOCYTE CLUMPS IN URINE BY AUTOMATED COUNT: ABNORMAL (ref 0–1)
LEUKOCYTE ESTERASE, URINE: ABNORMAL
LEUKOCYTES, UA: ABNORMAL /HPF (ref 0–5)
MUCUS: PRESENT
NITRITE, URINE: POSITIVE
PH, URINE: 6 (ref 4.5–8)
PROTEIN, URINE: NEGATIVE MG/DL
SPECIFIC GRAVITY UA: 1.02 (ref 1–1.03)
URINE HGB: NEGATIVE MG/DL
UROBILINOGEN, URINE: <2 MG/DL (ref 0–2)

## 2025-03-27 LAB
A/G RATIO: 1.3 RATIO (ref 0.8–2.6)
ALBUMIN: 3.9 G/DL (ref 3.5–5.2)
ALP BLD-CCNC: 89 U/L (ref 23–144)
ALT SERPL-CCNC: 10 U/L (ref 0–60)
AST SERPL-CCNC: 18 U/L (ref 0–55)
BILIRUB SERPL-MCNC: 0.6 MG/DL (ref 0–1.2)
BUN / CREAT RATIO: 20 (ref 7–25)
BUN BLDV-MCNC: 16 MG/DL (ref 3–29)
CALCIUM SERPL-MCNC: 9.3 MG/DL (ref 8.5–10.5)
CHLORIDE BLD-SCNC: 101 MEQ/L (ref 96–110)
CO2: 25 MEQ/L (ref 19–32)
CREAT SERPL-MCNC: 0.8 MG/DL (ref 0.5–1.2)
ESTIMATED GLOMERULAR FILTRATION RATE CREATININE EQUATION: 73 MLS/MIN/1.73M2
FASTING STATUS: ABNORMAL
GLOBULIN: 3 G/DL (ref 1.9–3.6)
GLUCOSE BLD-MCNC: 112 MG/DL (ref 70–99)
HBA1C MFR BLD: 6.4 %
HCT VFR BLD CALC: 39 % (ref 34–49)
HEMOGLOBIN: 12.5 G/DL (ref 11.2–15.7)
MCH RBC QN AUTO: 28.5 PG (ref 26–34)
MCHC RBC AUTO-ENTMCNC: 32.1 G/DL (ref 30.7–35.5)
MCV RBC AUTO: 89 FL (ref 80–100)
PDW BLD-RTO: 12.8 %
PLATELET # BLD: 210 K/UL (ref 140–400)
PMV BLD AUTO: 10.5 FL (ref 7.2–11.7)
POTASSIUM SERPL-SCNC: 4.3 MEQ/L (ref 3.4–5.3)
RBC # BLD: 4.38 M/UL (ref 3.95–5.26)
SODIUM BLD-SCNC: 140 MEQ/L (ref 135–148)
TOTAL PROTEIN: 6.9 G/DL (ref 6–8.3)
WBC # BLD: 6.5 K/UL (ref 3.5–10.9)

## 2025-04-18 LAB
BASOPHILS ABSOLUTE: 0.1 K/UL (ref 0–0.3)
BASOPHILS RELATIVE PERCENT: 1.1 % (ref 0–2)
DIFFERENTIAL COUNT: NORMAL
EOSINOPHILS ABSOLUTE: 0.1 K/UL (ref 0–0.5)
EOSINOPHILS RELATIVE PERCENT: 0.9 % (ref 0–5)
HCT VFR BLD CALC: 39.2 % (ref 34–49)
HEMOGLOBIN: 12.2 G/DL (ref 11.2–15.7)
IMMATURE GRANS (ABS): 0 K/UL (ref 0–0.1)
IMMATURE GRANULOCYTES %: 0.3 %
LYMPHOCYTES ABSOLUTE: 2.6 K/UL (ref 0.9–4.1)
LYMPHOCYTES RELATIVE PERCENT: 39.4 % (ref 14–51)
MCH RBC QN AUTO: 28.2 PG (ref 26–34)
MCHC RBC AUTO-ENTMCNC: 31.1 G/DL (ref 30.7–35.5)
MCV RBC AUTO: 90.5 FL (ref 80–100)
MONOCYTES ABSOLUTE: 0.6 K/UL (ref 0.2–1)
MONOCYTES RELATIVE PERCENT: 9.1 % (ref 4–12)
NEUTROPHILS ABSOLUTE: 3.2 K/UL (ref 1.8–7.5)
NEUTROPHILS RELATIVE PERCENT: 49.2 % (ref 42–80)
PDW BLD-RTO: 12.7 %
PLATELET # BLD: 197 K/UL (ref 140–400)
PMV BLD AUTO: 10.4 FL (ref 7.2–11.7)
RBC # BLD: 4.33 M/UL (ref 3.95–5.26)
RETICULOCYTE ABSOLUTE COUNT: 0 /100 WBC
VALPROIC ACID LEVEL: 26 MCG/ML (ref 50–100)
WBC # BLD: 6.6 K/UL (ref 3.5–10.9)

## 2025-06-21 ENCOUNTER — APPOINTMENT (OUTPATIENT)
Dept: CT IMAGING | Age: 85
End: 2025-06-21
Payer: MEDICARE

## 2025-06-21 ENCOUNTER — HOSPITAL ENCOUNTER (EMERGENCY)
Age: 85
Discharge: HOME | End: 2025-06-21
Payer: MEDICARE

## 2025-06-21 ENCOUNTER — APPOINTMENT (OUTPATIENT)
Dept: GENERAL RADIOLOGY | Age: 85
End: 2025-06-21
Payer: MEDICARE

## 2025-06-21 VITALS
WEIGHT: 165 LBS | HEART RATE: 73 BPM | SYSTOLIC BLOOD PRESSURE: 142 MMHG | OXYGEN SATURATION: 94 % | BODY MASS INDEX: 26.63 KG/M2 | DIASTOLIC BLOOD PRESSURE: 77 MMHG | RESPIRATION RATE: 20 BRPM | TEMPERATURE: 98.3 F

## 2025-06-21 DIAGNOSIS — S42.401A CLOSED FRACTURE OF DISTAL END OF RIGHT HUMERUS, UNSPECIFIED FRACTURE MORPHOLOGY, INITIAL ENCOUNTER: Primary | ICD-10-CM

## 2025-06-21 PROCEDURE — 70450 CT HEAD/BRAIN W/O DYE: CPT

## 2025-06-21 PROCEDURE — 73030 X-RAY EXAM OF SHOULDER: CPT

## 2025-06-21 PROCEDURE — 72125 CT NECK SPINE W/O DYE: CPT

## 2025-06-21 PROCEDURE — 71101 X-RAY EXAM UNILAT RIBS/CHEST: CPT

## 2025-06-21 PROCEDURE — 70486 CT MAXILLOFACIAL W/O DYE: CPT

## 2025-06-21 PROCEDURE — 73080 X-RAY EXAM OF ELBOW: CPT

## 2025-06-21 PROCEDURE — 6370000000 HC RX 637 (ALT 250 FOR IP): Performed by: NURSE PRACTITIONER

## 2025-06-21 PROCEDURE — 99284 EMERGENCY DEPT VISIT MOD MDM: CPT

## 2025-06-21 PROCEDURE — 73200 CT UPPER EXTREMITY W/O DYE: CPT

## 2025-06-21 PROCEDURE — 29105 APPLICATION LONG ARM SPLINT: CPT

## 2025-06-21 PROCEDURE — 73060 X-RAY EXAM OF HUMERUS: CPT

## 2025-06-21 RX ORDER — TRAZODONE HYDROCHLORIDE 50 MG/1
TABLET ORAL
COMMUNITY
Start: 2025-06-14

## 2025-06-21 RX ORDER — ACETAMINOPHEN 325 MG/1
650 TABLET ORAL ONCE
Status: COMPLETED | OUTPATIENT
Start: 2025-06-21 | End: 2025-06-21

## 2025-06-21 RX ORDER — OXYBUTYNIN CHLORIDE 5 MG/1
TABLET ORAL
COMMUNITY
Start: 2025-05-24

## 2025-06-21 RX ORDER — DIVALPROEX SODIUM 125 MG/1
CAPSULE, COATED PELLETS ORAL
COMMUNITY
Start: 2025-06-14

## 2025-06-21 RX ORDER — ACETAMINOPHEN 325 MG/1
650 TABLET ORAL EVERY 4 HOURS PRN
Qty: 40 TABLET | Refills: 1 | Status: SHIPPED | OUTPATIENT
Start: 2025-06-21

## 2025-06-21 RX ADMIN — ACETAMINOPHEN 650 MG: 325 TABLET ORAL at 16:40

## 2025-06-21 NOTE — ED TRIAGE NOTES
Patient presents with concerns of right arm pain status post mechanical fall on Monday. Large bruising noted to right arm as well as bruising to right orbital area. Patient did fall today per nursing home. Patient does have dementia and is poor historian. Per nursing home, patient is DNRCC

## 2025-06-21 NOTE — DISCHARGE INSTRUCTIONS
A minimally displaced distal humerus fracture in an elderly person refers to a break in the lower end of the humerus (the upper arm bone) where the fracture does not significantly shift out of alignment. These types of fractures can be challenging in older adults due to several factors like bone density, overall health, and the risk of complications such as poor healing or loss of function. However, the term \"minimally displaced\" suggests that the fracture isn't drastically out of position, which can sometimes mean a better prognosis for non-surgical treatment.  Key Considerations for Minimally Displaced Distal Humerus Fracture in the Elderly:    1. Non-Surgical Management (if stable enough)  Casting or Splinting: In cases where the bone ends are well aligned and the fracture is stable, a cast or splint may be used. The goal is to keep the arm immobile and allow the bone to heal naturally.  Brace/Orthosis: Some fractures might benefit from a hinged brace that allows for controlled motion of the elbow while still providing support for the fracture to heal.  2. Surgical Intervention  If the fracture, even though minimally displaced, doesn’t stabilize or if there's concern about the long-term function of the elbow (such as maintaining the ability to extend and bend the arm), surgery may be needed.  Internal fixation (using screws, plates, or wires) is a common surgical method for distal humerus fractures, ensuring that the bone is properly aligned during the healing process.  Elbow replacement (in very severe cases, or when there's poor bone quality) may also be considered, but this is usually a last resort.  3. Bone Quality and Healing  Osteopenia or Osteoporosis: Elderly individuals often have weaker bones, which can lead to poor healing or even complications like nonunion (failure to heal) or malunion (improper healing).  Calcium and Vitamin D supplements are often recommended to support bone healing.  Bone density  tests may be conducted to evaluate the risk of further fractures in the future.  4. Pain Management  Pain relief is crucial during the early stages of recovery.  NSAIDs (e.g., ibuprofen) or acetaminophen can be used to manage pain, but care should be taken in elderly patients, especially if there are underlying health conditions like kidney issues or gastrointestinal problems.  In some cases, stronger prescription pain relievers may be necessary, but these are usually prescribed for short-term use to prevent complications from long-term narcotic use.  5. Physical Therapy and Rehabilitation  Post-fracture rehabilitation is important, even for minimally displaced fractures. This includes:  Range-of-motion exercises to prevent stiffness, especially in the elbow joint.  Strengthening exercises once healing has progressed to prevent muscle atrophy and improve function.  Functional training to help the elderly individual regain independence with activities of daily living (e.g., dressing, eating, grooming).    6. Risk of Complications  Delayed Healing: The elderly population is at a higher risk for delayed union or nonunion due to reduced bone healing capacity.  Loss of Function: Because the elbow joint is vital for numerous functions (e.g., lifting, reaching), ensuring that full range of motion is regained is key to minimizing long-term disability.  Infection: If surgery is performed, there's always a risk of infection at the surgical site, particularly in those with diabetes or poor circulation.    7. Considerations for Functional Recovery  Depending on the severity and exact nature of the fracture, recovery time can vary. However, elderly patients often experience longer recovery periods due to factors like:  Slower healing due to age and bone quality.  Pre-existing conditions such as arthritis or poor circulation, which may affect healing or function.  Typical Timeline:  Healing: For minimally displaced fractures,

## 2025-06-22 NOTE — ED PROVIDER NOTES
[facility]        Note to patient: The 21st Century Cures Act requires that medical notes like this one to be available to patients in the interest of transparency. Please be advised, this is a medical document. It is intended as [id number] communication. It is written in medical language and may contain abbreviations and verbiage that may be unfamiliar. It may appear to read blunt or direct. Medical documents are intended to carry relevant medical information, facts as evident and the clinical opinion of the practitioner.     EMERGENCY MEDICINE     Pt Name: Cate Keenan  MRN: [id number]  Birthdate 1940  Date of evaluation: 6/21/2025  Provider: KIAN Patterson CNP    CHIEF COMPLAINT       Chief Complaint   Patient presents with    Arm Pain    Rib Pain (injury)     HISTORY OF PRESENT ILLNESS   Cate Keenan is a pleasant 84 y.o. female who presents to the emergency department from home with c/o injuries from a fall at the NH.  This was the second fall this week.  She has bruising on the right side of the face and around the eye.  Right shoulder and arm pain.  The patient has a swollen elbow and has decreased ROM of the right elbow.  No pain/decreased ROM in the lower extremities.        History is obtained from:  patient, family member - daughter  PASTMEDICAL HISTORY     Past Medical History:   Diagnosis Date    Aortic stenosis, mild     Noted on echo at [facility] in 2022    Arthritis     hands, knee    ASHD (arteriosclerotic heart disease)     Cochlear implant in place 2009    Left    Dementia (HCC)     Depression with anxiety     DM2 (diabetes mellitus, type 2) (HCC)     Dyslipidemia     Eczema     History of cancer of right breast 11/08/2004    Right Breast invasive ductal carcinoma    History of therapeutic radiation 2004    Right breast    Hx antineoplastic chemo 2004    Right breast cancer    Hypertension, essential     Insomnia     Mood disorder     OAB (overactive

## (undated) DEVICE — BASIC SINGLE BASIN BTC-LF: Brand: MEDLINE INDUSTRIES, INC.

## (undated) DEVICE — PAD,ABDOMINAL,5"X9",ST,LF,25/BX: Brand: MEDLINE INDUSTRIES, INC.

## (undated) DEVICE — 3M™ WARMING BLANKET, UPPER BODY, 10 PER CASE, 42268: Brand: BAIR HUGGER™

## (undated) DEVICE — BLADE SAW L510MM S STL GIGLI FOR BNE CUT

## (undated) DEVICE — GOWN,SIRUS,NON REINFRCD,LARGE,SET IN SL: Brand: MEDLINE

## (undated) DEVICE — GEL ULTRASOUND 20GM OVERWRAPPED NON STEN

## (undated) DEVICE — SUTURE ETHBND SZ 1 L30IN NONABSORBABLE GRN OS-6 L36MM 1/2 X538H

## (undated) DEVICE — SOLUTION IV 1000ML 0.9% SOD CHL PH 5 INJ USP VIAFLX PLAS

## (undated) DEVICE — GLOVE SURG SZ 65 THK91MIL LTX FREE SYN POLYISOPRENE

## (undated) DEVICE — STRYKER PERFORMANCE SERIES SAGITTAL BLADE: Brand: STRYKER PERFORMANCE SERIES

## (undated) DEVICE — GLOVE ORANGE PI 8 1/2   MSG9085

## (undated) DEVICE — COVER ARMBRD W13XL28.5IN IMPERV BLU FOR OP RM

## (undated) DEVICE — BANDAGE ADH W1XL3IN NAT FAB WVN FLX DURABLE N ADH PD SEAL

## (undated) DEVICE — Device

## (undated) DEVICE — 4-PORT MANIFOLD: Brand: NEPTUNE 2

## (undated) DEVICE — 3M™ IOBAN™ 2 ANTIMICROBIAL INCISE DRAPE 6650EZ: Brand: IOBAN™ 2

## (undated) DEVICE — SPONGE DRN W4XL4IN RAYON/POLYESTER 6 PLY NONWOVEN PRECUT

## (undated) DEVICE — SPONGE GZ W4XL4IN COT 12 PLY TYP VII WVN C FLD DSGN

## (undated) DEVICE — IMMOBILIZER KNEE PREMIER PRO TRI PNL 22INCH FOAM TIETEX PAT

## (undated) DEVICE — IMPREGNATED GAUZE DRESSING: Brand: CUTICERIN 7.5X20CM CTN 50

## (undated) DEVICE — PADDING CAST W6INXL4YD COT LO LINTING WYTEX

## (undated) DEVICE — PADDING,UNDERCAST,COTTON, 4"X4YD STERILE: Brand: MEDLINE

## (undated) DEVICE — CHLORAPREP 26ML ORANGE

## (undated) DEVICE — PAD PREP M ISO ALC 2 PLY NONWOVEN SFT ABSRB

## (undated) DEVICE — GLOVE ORANGE PI 8   MSG9080

## (undated) DEVICE — TRAY EPI 25GA L3.5IN 0.75% BIPIVCAIN 8.25% D CONTAIN BPA

## (undated) DEVICE — SUTURE VCRL SZ 2-0 L27IN ABSRB UD L36MM CP-1 1/2 CIR REV J266H

## (undated) DEVICE — BLADE SAW  6 13X90X1.27MM

## (undated) DEVICE — VORTEX VACUUM MIXING SYSTEM: Brand: VORTEX

## (undated) DEVICE — SUTURE VCRL SZ 0 L27IN ABSRB UD L36MM CP-1 1/2 CIR REV CUT J267H

## (undated) DEVICE — GLOVE ORANGE PI 7   MSG9070

## (undated) DEVICE — BANDAGE COMPR M W6INXL10YD WHT BGE VELC E MTRX HK AND LOOP

## (undated) DEVICE — NEEDLE ECHOGENIC 20GA L4IN INSUL W/ 30DEG BVL EXTN SET

## (undated) DEVICE — GENESIS TROCHLEAR PIN 1/8 X 3: Brand: GENESIS